# Patient Record
Sex: FEMALE | Race: WHITE | NOT HISPANIC OR LATINO | Employment: UNEMPLOYED | URBAN - METROPOLITAN AREA
[De-identification: names, ages, dates, MRNs, and addresses within clinical notes are randomized per-mention and may not be internally consistent; named-entity substitution may affect disease eponyms.]

---

## 2017-01-11 ENCOUNTER — GENERIC CONVERSION - ENCOUNTER (OUTPATIENT)
Dept: OTHER | Facility: OTHER | Age: 47
End: 2017-01-11

## 2017-02-07 ENCOUNTER — ALLSCRIPTS OFFICE VISIT (OUTPATIENT)
Dept: OTHER | Facility: OTHER | Age: 47
End: 2017-02-07

## 2017-03-22 ENCOUNTER — ALLSCRIPTS OFFICE VISIT (OUTPATIENT)
Dept: OTHER | Facility: OTHER | Age: 47
End: 2017-03-22

## 2017-04-04 ENCOUNTER — ALLSCRIPTS OFFICE VISIT (OUTPATIENT)
Dept: OTHER | Facility: OTHER | Age: 47
End: 2017-04-04

## 2017-04-07 ENCOUNTER — LAB CONVERSION - ENCOUNTER (OUTPATIENT)
Dept: OTHER | Facility: OTHER | Age: 47
End: 2017-04-07

## 2017-04-07 LAB
A/G RATIO (HISTORICAL): 1.7 (CALC) (ref 1–2.5)
ALBUMIN SERPL BCP-MCNC: 4.2 G/DL (ref 3.6–5.1)
ALP SERPL-CCNC: 53 U/L (ref 33–115)
ALT SERPL W P-5'-P-CCNC: 16 U/L (ref 6–29)
AST SERPL W P-5'-P-CCNC: 18 U/L (ref 10–35)
BILIRUB SERPL-MCNC: 0.6 MG/DL (ref 0.2–1.2)
BUN SERPL-MCNC: 17 MG/DL (ref 7–25)
BUN/CREA RATIO (HISTORICAL): NORMAL (CALC) (ref 6–22)
CALCIUM SERPL-MCNC: 9.6 MG/DL (ref 8.6–10.2)
CHLORIDE SERPL-SCNC: 106 MMOL/L (ref 98–110)
CHOLEST SERPL-MCNC: 216 MG/DL (ref 125–200)
CHOLEST/HDLC SERPL: 3.1 (CALC)
CO2 SERPL-SCNC: 28 MMOL/L (ref 20–31)
CREAT SERPL-MCNC: 0.71 MG/DL (ref 0.5–1.1)
EGFR AFRICAN AMERICAN (HISTORICAL): 117 ML/MIN/1.73M2
EGFR-AMERICAN CALC (HISTORICAL): 101 ML/MIN/1.73M2
GAMMA GLOBULIN (HISTORICAL): 2.5 G/DL (CALC) (ref 1.9–3.7)
GLUCOSE (HISTORICAL): 92 MG/DL (ref 65–99)
HBA1C MFR BLD HPLC: 5.3 % OF TOTAL HGB
HDLC SERPL-MCNC: 70 MG/DL
INSULIN (HISTORICAL): 6.3 UIU/ML (ref 2–19.6)
LDL CHOLESTEROL (HISTORICAL): 133 MG/DL (CALC)
NON-HDL-CHOL (CHOL-HDL) (HISTORICAL): 146 MG/DL (CALC)
POTASSIUM SERPL-SCNC: 4.3 MMOL/L (ref 3.5–5.3)
SODIUM SERPL-SCNC: 141 MMOL/L (ref 135–146)
TOTAL PROTEIN (HISTORICAL): 6.7 G/DL (ref 6.1–8.1)
TRIGL SERPL-MCNC: 63 MG/DL
TSH SERPL DL<=0.05 MIU/L-ACNC: 1.99 MIU/L

## 2017-04-18 ENCOUNTER — ALLSCRIPTS OFFICE VISIT (OUTPATIENT)
Dept: OTHER | Facility: OTHER | Age: 47
End: 2017-04-18

## 2017-05-10 ENCOUNTER — GENERIC CONVERSION - ENCOUNTER (OUTPATIENT)
Dept: OTHER | Facility: OTHER | Age: 47
End: 2017-05-10

## 2017-05-24 ENCOUNTER — GENERIC CONVERSION - ENCOUNTER (OUTPATIENT)
Dept: OTHER | Facility: OTHER | Age: 47
End: 2017-05-24

## 2017-07-07 ENCOUNTER — GENERIC CONVERSION - ENCOUNTER (OUTPATIENT)
Dept: OTHER | Facility: OTHER | Age: 47
End: 2017-07-07

## 2017-07-19 ENCOUNTER — GENERIC CONVERSION - ENCOUNTER (OUTPATIENT)
Dept: OTHER | Facility: OTHER | Age: 47
End: 2017-07-19

## 2017-12-27 ENCOUNTER — ALLSCRIPTS OFFICE VISIT (OUTPATIENT)
Dept: OTHER | Facility: OTHER | Age: 47
End: 2017-12-27

## 2017-12-27 ENCOUNTER — TRANSCRIBE ORDERS (OUTPATIENT)
Dept: ADMINISTRATIVE | Facility: HOSPITAL | Age: 47
End: 2017-12-27

## 2017-12-27 DIAGNOSIS — R20.2 PARESTHESIA OF SKIN: ICD-10-CM

## 2017-12-27 DIAGNOSIS — M79.2 PAROXYSMAL NERVE PAIN: ICD-10-CM

## 2017-12-27 DIAGNOSIS — R20.2 PARESTHESIA: Primary | ICD-10-CM

## 2017-12-27 DIAGNOSIS — M79.2 NEURALGIA AND NEURITIS, UNSPECIFIED (CODE): ICD-10-CM

## 2017-12-28 NOTE — PROGRESS NOTES
Assessment   1  Nasal sinus congestion (478 19) (R09 81)   2  Paresthesia of right upper extremity (782 0) (R20 2)   3  Radicular pain of right upper extremity (729 2) (M79 2)    Plan   Nasal sinus congestion    · Azithromycin 250 MG Oral Tablet; TAKE 2 TABLETS ON DAY 1 THEN TAKE 1    TABLET A DAY FOR 4 DAYS  Paresthesia of right upper extremity, Radicular pain of right upper extremity    · * CT SPINE CERVICAL WO CONTRAST; Status:Need Information - Financial    Authorization; Requested for:[id number];    · EMG ONE EXTREMITY WITH OR W/O RELATED PARASPINAL AREAS; Status:Hold For    - Scheduling; Requested for:[id number];   ONE : RUE  Radicular pain of right upper extremity    · PredniSONE 20 MG Oral Tablet; Take 1 tab twice a day for three days then 1 tab    daily for 3 days    Discussion/Summary      Nasal sinus congestion and pharyngitis- drink plenty of fluids  rest  You may take a decongestant such as Sudafed or Allegra D   arm pain and numbness- known cervical spine disc disease  Check xray  Start Prednisone  If no imprvmnt, check EMG  The patient was counseled regarding  Possible side effects of new medications were reviewed with the patient/guardian today  The treatment plan was reviewed with the patient/guardian  The patient/guardian understands and agrees with the treatment plan       Self Referrals: No      Chief Complaint   1  Cold Symptoms  c/c- cold like symptoms x1 day, sore throat, headache, upset stomach, ear pain, nasal congestion, pressure behind eyes  Pt denies fever  Pt also states right arm numbness, spanning from finger tips to shoulder  Pt states she has tried OTC meds for the cold with no relief  History of Present Illness   HPI: Pt has had numbness of right arm for one week  At onset, she was awaken during the night with the pain  She thought she slept on it wrong, pain is waking her up at night  She took Advil 1000 mg which did not help   She then took her husbands Percocet, which helped a little  has had two days of sinus pressure and congestion  She started with a sore throat and ear pain, along with chills  Review of Systems        Constitutional: feeling poorly, but-- as noted in HPI       ENT: sore throat, but-- as noted in HPI  Cardiovascular: no complaints of slow or fast heart rate, no chest pain, no palpitations, no leg claudication or lower extremity edema  Respiratory: no complaints of shortness of breath, no wheezing, no dyspnea on exertion, no orthopnea or PND  Gastrointestinal: no complaints of abdominal pain, no constipation, no nausea or diarrhea, no vomiting, no bloody stools  Neurological: numbness, but-- as noted in HPI  Active Problems   1  Elevated cholesterol (272 0) (E78 00)   2  Elevated liver enzymes (790 5) (R74 8)   3  Moderate persistent asthma (493 90) (J45 40)   4  Overweight (BMI 25 0-29 9) (278 02) (E66 3)    Past Medical History   1  History of Diabetes mellitus screening (V77 1) (Z13 1)   2  History of nausea and vomiting (V12 79) (Z87 898)   3  History of Injury of left lower extremity, initial encounter (959 7) (S89 92XA)   4  History of Lipid screening (V77 91) (Z13 220)   5  Moderate persistent asthma (493 90) (J45 40)  Active Problems And Past Medical History Reviewed: The active problems and past medical history were reviewed and updated today  Family History   Father    1  Family history of Benign Essential Hypertension  Maternal Grandmother    2  Family history of Diabetes Mellitus (V18 0)  Paternal Grandfather    3  Family history of Colon Cancer (V16 0)  Family History Reviewed: The family history was reviewed and updated today  Social History    · Being A Social Drinker   · Denied: History of Drug Use   · Former smoker (F36 42) (O30 230)  The social history was reviewed and updated today  The social history was reviewed and is unchanged  Surgical History   1  History of Abdominoplasty   2  History of Cholecystectomy  Surgical History Reviewed: The surgical history was reviewed and updated today  Current Meds    1  Advair Diskus 250-50 MCG/DOSE Inhalation Aerosol Powder Breath Activated Recorded   2  Azithromycin 250 MG Oral Tablet; TAKE 2 TABLETS ON DAY 1 THEN TAKE 1 TABLET A     DAY FOR 4 DAYS; Therapy: 80LZM8033 to (Evaluate:18Apr2017)  Requested for: 13Apr2017; Last     Rx:13Apr2017 Ordered   3  Claritin CAPS Recorded   4  Flonase 50 MCG/ACT SUSP Recorded   5  HM Hair/Skin/Nails Oral Tablet Recorded   6  Montelukast Sodium 10 MG Oral Tablet; TAKE 1 TABLET DAILY AS DIRECTED; Therapy: 34RXV7982 to (Evaluate:21Apr2017)  Requested for: 35SKH9591; Last     Rx:22Mar2017 Ordered   7  RaNITidine HCl - 150 MG Oral Tablet; TAKE 1 TABLET DAILY; Therapy: 59FEZ9996 to (Evaluate:21Apr2017); Last Rx:22Mar2017 Ordered   8  Ventolin  (90 Base) MCG/ACT Inhalation Aerosol Solution; Therapy: 87GPG6814 to (Evaluate:29Apr2015) Recorded   9  Vitamin D 1000 UNIT Oral Tablet Recorded     The medication list was reviewed and updated today  Allergies   1  Penicillins    Vitals    Recorded: 04HQE9411 10:09AM   Temperature 98 2 F   Heart Rate 71   Respiration 18   Systolic 141 mm Hg   Diastolic 78 mm Hg   Height 5 ft 5 5 in   Weight 167 lb    BMI Calculated 27 37 kg/m2   BSA Calculated 1 84 m2   O2 Saturation 97     Physical Exam        Constitutional      General appearance: No acute distress, well appearing and well nourished  Eyes      Conjunctiva and lids: No swelling, erythema or discharge  Pupils and irises: Equal, round and reactive to light  Ears, Nose, Mouth, and Throat      External inspection of ears and nose: Normal        Otoscopic examination: Tympanic membranes translucent with normal light reflex  Canals patent without erythema  Nasal mucosa, septum, and turbinates: Abnormal  -- nasal congestion with mucoid d/s        Oropharynx: Normal with no erythema, edema, exudate or lesions  Pulmonary      Respiratory effort: No increased work of breathing or signs of respiratory distress  Auscultation of lungs: Clear to auscultation  Cardiovascular      Auscultation of heart: Normal rate and rhythm, normal S1 and S2, without murmurs  Abdomen      Abdomen: Non-tender, no masses  Lymphatic      Palpation of lymph nodes in neck: No lymphadenopathy  Musculoskeletal      Gait and station: Normal  -- full ROM  Pain of [place] and right side of neck with straight arm raise  Skin      Skin and subcutaneous tissue: Normal without rashes or lesions  Neurologic      Sensation: Abnormal  -- pins and needles and painful with light touch using monofilament--to RUE  Psychiatric      Orientation to person, place, and time: Normal        Mood and affect: Normal           Results/Data   PHQ-9 Adult Depression Screening 65Kpm3625 10:12AM User, [facility]      Test Name Result Flag Reference   PHQ-9 Adult Depression Score 0     Over the last two weeks, how often have you been bothered by any of the following problems? Little interest or pleasure in doing things: Not at all - 0     Feeling down, depressed, or hopeless: Not at all - 0     Trouble falling or staying asleep, or sleeping too much: Not at all - 0     Feeling tired or having little energy: Not at all - 0     Poor appetite or over eating: Not at all - 0     Feeling bad about yourself - or that you are a failure or have let yourself or your family down: Not at all - 0     Trouble concentrating on things, such as reading the newspaper or watching television: Not at all - 0     Moving or speaking so slowly that other people could have noticed   Or the opposite -  being so fidgety or restless that you have been moving around a lot more than usual: Not at all - 0     Thoughts that you would be better off dead, or of hurting yourself in some way: Not at all - 0   PHQ-9 Adult Depression Screening Negative     PHQ-9 Difficulty Level Not difficult at all     PHQ-9 Severity No Depression          Message   Return to work or school:    Kirby Garcia is under my professional care   She was seen in my office on 12-27-17    She is able to return to work on  12-30-17             Signatures    Electronically signed by : Kota Em NP; Dec 27 2017 10:32AM EST                       (Author)     Electronically signed by : Libra Ruth MD; Dec 27 2017 11:18AM EST                       (Co-author)

## 2018-01-02 ENCOUNTER — HOSPITAL ENCOUNTER (OUTPATIENT)
Dept: RADIOLOGY | Facility: MEDICAL CENTER | Age: 48
Discharge: HOME | End: 2018-01-02
Payer: COMMERCIAL

## 2018-01-02 DIAGNOSIS — R20.2 PARESTHESIA OF SKIN: ICD-10-CM

## 2018-01-02 DIAGNOSIS — M79.2 NEURALGIA AND NEURITIS, UNSPECIFIED (CODE): ICD-10-CM

## 2018-01-02 PROCEDURE — 72125 CT NECK SPINE W/O DYE: CPT

## 2018-01-03 ENCOUNTER — TRANSCRIBE ORDERS (OUTPATIENT)
Dept: ADMINISTRATIVE | Facility: HOSPITAL | Age: 48
End: 2018-01-03

## 2018-01-03 DIAGNOSIS — R20.2 TINGLING SENSATION: Primary | ICD-10-CM

## 2018-01-03 DIAGNOSIS — M79.2 NERVE PAIN: ICD-10-CM

## 2018-01-10 NOTE — PROGRESS NOTES
History of Present Illness  HPI: Farhat Ramon is here for 2 wk f/u  Current wt 175 9 lbs, gain of 1 1 lb x 2 wks  Has been sick & on prednisone x4 days with 5 to go as well as 2 different antibiotics  Unable to follow meal plan as well or exercise due to illness  She states that prior to getting sick she was down about 2 lbs  Food recall prior to illness is ~1000 calories rest, 1300 exercise days  Discussed where additional protein should be incorporated, specifically lunch and afternoon snack  If she does not lose weight by next appointment in 3 weeks she is going to utilize 2 meal replacements  Bariatric MNT [facility]:   Weight Assessment: IBW: 127 5 lbs, ABW: 139 6 lbs, Weight Change: 1 1 lb gain x 2 wks, Highest Weight: 175 lbs, Lowest Weight: 134 lbs, Goal Weight: 145-150 lbs  Excess calories come from large portions at mealtimes and high calorie high fat food choices  Dietary Recall:   Breakfast: oatmeal OR organic meal (2 scoops 220, 20 gm + 8 oz almond milk (40, 3)  Snack: skip having shake with it OR chobani flips  Lunch: cottage cheese w/pineapple OR sauteed veggies w/olive oil, 1/2 tsp cheese, 1 egg  Snack: skip OR small pear  Dinner: 3-4 oz lean protein, sweet potato, veggies  Snack: skip   Beverages: water, unsweetened tea, coffee, diet ginger ale  Alcohol consumption: social    Food allergies/intolerances: n/a  Cooking: both  Shopping: self  She dines out occasionally  Exercise Frequency:  She exercises yoga 5x/wk 60-90 min, running 2x/wk  Nutrition Prescription: Estimated calories for weight loss: eCalc BMR 1437, wt loss w/o exercise 724-1224; w/exercise 976-1476, Estimated daily protein needs: 70-87 gm (1 2-1 5 gm/kg IBW) and Estimated daily fluid needs: 68 oz (35 cc/kg IBW)  Nutrition Intervention: Counseling provided with emphasis on portion sizes, healthy snack choices, hydration, fiber intake, protein intake, exercise and food journaling     Education materials provided: [employer] manual    Comprehension: good  Expected Compliance: good  Goals: follow meal plan prescribed, reduce portion sizes at mealtimes, plan meals and snacks daily, Choose lower-calorie, lower-fat meal options at home and when dining out, food journal, do not skip meals or snacks and 810-1150 calories  Monitor/Evaluation: weekly weight, food journal, fluid intake and exercise level  Active Problems    1  Abnormal weight gain (783 1) (R63 5)   2  Diarrhea (787 91) (R19 7)   3  Dysphagia (787 20) (R13 10)   4  Elevated cholesterol (272 0) (E78 00)   5  Elevated liver enzymes (790 5) (R74 8)   6  Fatigue (780 79) (R53 83)   7  Gastroenteritis (558 9) (K52 9)   8  Head congestion (478 19) (R09 81)   9  Moderate persistent asthma (493 90) (J45 40)   10  Overweight (BMI 25 0-29 9) (278 02) (E66 3)   11  Viral syndrome (079 99) (B34 9)    Past Medical History    1  History of Diabetes mellitus screening (V77 1) (Z13 1)   2  History of nausea and vomiting (V12 79) (Z87 898)   3  History of Injury of left lower extremity, initial encounter (959 7) (S89 92XA)   4  History of Lipid screening (V77 91) (Z13 220)   5  Moderate persistent asthma (493 90) (J45 40)    Surgical History    1  History of Abdominoplasty   2  History of Cholecystectomy    Family History  Father    1  Family history of Benign Essential Hypertension  Maternal Grandmother    2  Family history of Diabetes Mellitus (V18 0)  Paternal Grandfather    3  Family history of Colon Cancer (V16 0)    Social History    · Being A Social Drinker   · Denied: History of Drug Use   · Former smoker (V15 82) (J17 391)    Current Meds   1  Advair Diskus 250-50 MCG/DOSE Inhalation Aerosol Powder Breath Activated Recorded   2  Azithromycin 250 MG Oral Tablet; TAKE 2 TABLETS ON DAY 1 THEN TAKE 1 TABLET A   DAY FOR 4 DAYS; Therapy: 10LBH5180 to (Evaluate:18Apr2017)  Requested for: 13Apr2017; Last   Rx:13Apr2017 Ordered   3  Claritin CAPS Recorded   4  Flonase 50 MCG/ACT SUSP Recorded   5  HM Hair/Skin/Nails Oral Tablet Recorded   6  Montelukast Sodium 10 MG Oral Tablet; TAKE 1 TABLET DAILY AS DIRECTED; Therapy: 35EPP5015 to (Evaluate:21Apr2017)  Requested for: 71YYW3880; Last   Rx:22Mar2017 Ordered   7  RaNITidine HCl - 150 MG Oral Tablet; TAKE 1 TABLET DAILY; Therapy: 02MPG8208 to (Evaluate:21Apr2017); Last Rx:22Mar2017 Ordered   8  Ventolin  (90 Base) MCG/ACT Inhalation Aerosol Solution; Therapy: 45NUI9066 to (Evaluate:29Apr2015) Recorded   9  Vitamin D 1000 UNIT Oral Tablet Recorded    Allergies    1  Penicillins    Vitals  Signs   Recorded: 18Apr2017 10:22AM   Height: 5 ft 5 5 in  Weight: 175 lb 14 4 oz  BMI Calculated: 28 83  BSA Calculated: 1 88    Future Appointments    Date/Time Provider Specialty Site   05/11/2017 10:00 AM Faith Joaquin [address] [facility]   07/12/2017 11:00 AM FRACISCO Howard  Bariatric Medicine [facility]     Signatures   Electronically signed by :  Frank Leon, ; Apr 18 2017 10:54AM EST                       (Author)    Electronically signed by : FRACISCO Bauman ; Apr 20 2017  7:59AM EST                       (Co-author)

## 2018-01-11 NOTE — RESULT NOTES
Message   her stool tests came back negative       Verified Results  (Q) FECAL LEUKOCYTE STAIN [id number] 10:20AM [facility]     Test Name Result Flag Reference   FECAL LEUKOCYTE STAIN      FECAL LEUKOCYTE STAIN         MICRO NUMBER:      [id number]    TEST STATUS:       FINAL    SPECIMEN SOURCE:   STOOL    SPECIMEN QUALITY:  ADEQUATE    FECAL LEUKOCYTE:   Not Detected

## 2018-01-12 NOTE — CONSULTS
Chief Complaint  Chief Complaint Free Text Note Form: New patient here for MWM consult; Stop Bang 1/8  Patient reports she has been on Qsymia & Phentermine in the past, with little weight loss  Past Medical History    1  Moderate persistent asthma (493 90) (J45 40)  Active Problems And Past Medical History Reviewed: The active problems and past medical history were reviewed and updated today  Assessment    1  Abnormal weight gain (783 1) (R63 5)   2  Elevated liver enzymes (790 5) (R74 8)   3  Moderate persistent asthma (493 90) (J45 40)   4  Overweight (BMI 25 0-29 9) (278 02) (E66 3)   5  Dysphagia (787 20) (R13 10)    Plan   1  (1) COMPREHENSIVE METABOLIC PANEL; Status:Active; Requested for:04Apr2017;    2  (1) HEMOGLOBIN A1C; Status:Active; Requested for:04Apr2017;    3  (1) INSULIN, FASTING; Status:Active; Requested for:04Apr2017;    4  (1) LIPID PANEL, FASTING; Status:Active; Requested for:04Apr2017;    5  (1) TSH WITH FT4 REFLEX; Status:Active; Requested for:04Apr2017;     Discussion/Summary  Discussion Summary:   78-year-old female with elevated liver enzymes, asthma, GERD and excess weight here to pursue medical weight management to improve weight and health  Excess weight/weight gain/overweight:   -discussed options of conservative vs TIFFANIE program +/- meal replacement vs VLCD  -initial focus of 5-10% weight loss over 3-6 mos for improved health  -screening labs  -pt had been prescribed phentermine in the past-stopped working and recently prescribed qsymia  Will hold off on medications so far as she will be starting a structured program  May possibly re-visit weight loss medications if indicated      Dysphagia: Stable  -On H2 blocker  -Follows with GI- Dr Yamilet Devi    Elevated liver enzymes:  -unclear etiology, recheck    Patient is interested in new direction-intensive lifestyle intervention program  We'll set up visit with dietitian and 12 week after completion of program with me  Patient's Capacity to Self-Care: Patient is able to Self-Care  Bariatric Medicine Diagnostic [facility]:   Patient Discussion: 45 minute visit, greater than half of the time was spent on counseling  Counseled patient on the benefits of a modest 5-10% weight loss with regards to cardio metabolic risk  Discussed different nonsurgical interventions including intensive lifestyle intervention program, very low calorie diet program and conservative program  Discussed the role of weight loss medications   Understands and agrees with treatment plan: The treatment plan was reviewed with the patient/guardian  The patient/guardian understands and agrees with the treatment plan      Active Problems    1  Abnormal weight gain (783 1) (R63 5)   2  Diarrhea (787 91) (R19 7)   3  Dysphagia (787 20) (R13 10)   4  Elevated cholesterol (272 0) (E78 00)   5  Elevated liver enzymes (790 5) (R74 8)   6  Fatigue (780 79) (R53 83)   7  Gastroenteritis (558 9) (K52 9)   8  Head congestion (478 19) (R09 81)   9  Moderate persistent asthma (493 90) (J45 40)   10  Viral syndrome (079 99) (B34 9)    Surgical History    1  History of Cholecystectomy  Surgical History Reviewed: The surgical history was reviewed and updated today  Family History  Father    1  Family history of Benign Essential Hypertension  Maternal Grandmother    2  Family history of Diabetes Mellitus (V18 0)  Paternal Grandfather    3  Family history of Colon Cancer (V16 0)  Family History Reviewed: The family history was reviewed and updated today  Social History    · Being A Social Drinker   · Denied: History of Drug Use   · Former smoker (P44 84) (Y37 262)  Social History Reviewed: The social history was reviewed and updated today  Current Meds   1  Advair Diskus 250-50 MCG/DOSE Inhalation Aerosol Powder Breath Activated Recorded   2  Claritin CAPS Recorded   3  Flonase 50 MCG/ACT SUSP Recorded   4  HM Hair/Skin/Nails Oral Tablet Recorded   5   Montelukast Sodium 10 MG Oral Tablet; TAKE 1 TABLET DAILY AS DIRECTED; Therapy: 69EFH5235 to (Evaluate:21Apr2017)  Requested for: 51DHL1359; Last   Rx:22Mar2017 Ordered   6  RaNITidine HCl - 150 MG Oral Tablet; TAKE 1 TABLET DAILY; Therapy: 71SNM4842 to (Evaluate:21Apr2017); Last Rx:22Mar2017 Ordered   7  Ventolin  (90 Base) MCG/ACT Inhalation Aerosol Solution; Therapy: 58VFA4318 to (Evaluate:29Apr2015) Recorded   8  Vitamin D 1000 UNIT Oral Tablet Recorded  Medication List Reviewed: The medication list was reviewed and updated today  Allergies    1  Penicillins    Vitals  Vital Signs    Recorded: 72NPD5847 11:22AM   Temperature 98 4 F    Heart Rate 80    Respiration 16    Systolic 562    Diastolic 82    Height 5 ft 5 5 in    Weight 173 lb 9 6 oz    BMI Calculated 28 45    BSA Calculated 1 87    Waist Circumference  35   Neck Circumference  14 5     Results/Data  STOP BANG Questionnaire 04Apr2017 11:37AM User, [facility]     Test Name Result Flag Reference   STOP BANG Questionnaire Risk of SCOTT Low Risk     Snoring: No  Tired: Yes  Observed: No  Blood Pressure: No  BMI: No  Age: No  Neck Circumference: No  Gender: No   STOP BANG Questionnaire SCOTT Total Score 1     Snoring: No  Tired: Yes  Observed: No  Blood Pressure: No  BMI: No  Age: No  Neck Circumference: No  Gender: No       Future Appointments    Date/Time Provider Specialty Site   04/19/2017 11:00 AM Harpreet Titus MD Family Medicine [address] [place] [address]   04/18/2017 10:30 AM Faith Joaquin [facility]   07/12/2017 11:00 AM FRACISCO Snider   Bariatric Medicine [facility]     Signatures   Electronically signed by : FRACISCO Martinez ; Apr 4 2017  5:46PM EST                       (Author)    Electronically signed by : FRACISCO Martinez ; Apr 4 2017  5:47PM EST                       (Author)

## 2018-01-13 VITALS
DIASTOLIC BLOOD PRESSURE: 82 MMHG | BODY MASS INDEX: 27.9 KG/M2 | SYSTOLIC BLOOD PRESSURE: 126 MMHG | TEMPERATURE: 98.4 F | WEIGHT: 173.6 LBS | HEART RATE: 80 BPM | HEIGHT: 66 IN | RESPIRATION RATE: 16 BRPM

## 2018-01-13 VITALS
OXYGEN SATURATION: 98 % | TEMPERATURE: 98.3 F | HEART RATE: 71 BPM | SYSTOLIC BLOOD PRESSURE: 128 MMHG | HEIGHT: 66 IN | WEIGHT: 175.13 LBS | DIASTOLIC BLOOD PRESSURE: 82 MMHG | BODY MASS INDEX: 28.15 KG/M2

## 2018-01-13 NOTE — PROGRESS NOTES
History of Present Illness  HPI: Eli Bolivar is here for initial RD session for [facility] program  Current wt: 174 8 lbs  States she has tired multiple diets in the past and still continues to gain weight  Exercises doing yoga and will soon start running 2x/wk to train for a 5K  Reports she has tried to eat 800 calories/day in the past and she gained weight with this  Explained that her needs for weight loss do range between 800-1000 calories with slightly higher amounts of exercise days  She is skeptical of this  Eats off salad plate to keep portion sizes controlled  Not currently tracking but willing to do so  [facility]ariatric [facility]:   Weight Assessment: IBW: 127 5 lbs, ABW: 139 3 lbs, Highest Weight: 175 lbs, Lowest Weight: 134 lbs, Goal Weight: 145-150 lbs  Excess calories come from unknown as dietary recall reflects good intake, ? accuracy of this recall  Dietary Recall:   Breakfast: 5:30 a m   9:00: 1/2 c Fage nonfat yogurt, fruit, 1 tsp honey or 1/2 cup baked oatmeal    Snack: 11:30 cheese stick  Lunch: 12:30-1: ~3 oz lean protein, veggies, hummus, sometimes crackers  Snack: skip  Dinner: 5:30-6: ~3 oz lean protein, sometimes carb, pasta 1x/wk  Snack: usually skip   Beverages: water, coffee w/Portuguese vanilla creamer  Estimated fluid intake: <128 oz  Alcohol consumption: social    Food allergies/intolerances: n/a  Cooking: both  Shopping: self  She dines out occasionally  Exercise Frequency:  She exercises yoga 5x/wk 60-90 minutes, running 2x/wk   Nutrition Prescription: Estimated calories for weight loss: Tanita BMR 1480x1  3-4001=521, eCalc BMR 1426, wt loss w/o exercise 711-1211, w/exercise 961-1461, Estimated daily protein needs: 70-87 gm (1 2-1 5 gm/kg IBW) and Estimated daily fluid needs: 68 oz (35 cc/kg IBW)  Breakfast: 200, >15 gm: Fage Greek yogurt: 140, 12 & hard boiled egg  Snack: food snack  Lunch: 200-300, 21 gm: 3 oz lean pro, 1 carb, veggies     Snack: food snack  Dinner: 250-360 calories: 3 oz lean pro, 1 carb, veggies, 1 fat  Snack: skip or food snack   Nutrition Intervention: Counseling provided with emphasis on meal planning, portion sizes, healthy snack choices, hydration, fiber intake, protein intake, exercise and food journaling  Education materials provided: [facility] manual and calorie controlled menus  Comprehension: good  Expected Compliance: good  Goals: follow meal plan prescribed, reduce portion sizes at mealtimes, plan meals and snacks daily, Choose lower-calorie, lower-fat meal options at home and when dining out, food journal, do not skip meals or snacks and 810-1150 calories  Monitor/Evaluation: weekly weight, food journal, fluid intake and exercise level  Active Problems     1  Diarrhea (787 91) (R19 7)   2  Elevated cholesterol (272 0) (E78 00)   3  Fatigue (780 79) (R53 83)   4  Gastroenteritis (558 9) (K52 9)   5  Head congestion (478 19) (R09 81)   6  Viral syndrome (079 99) (B34 9)    Moderate persistent asthma (493 90) (J45 40)       Elevated liver enzymes (790 5) (R74 8)       Dysphagia (787 20) (R13 10)       Abnormal weight gain (783 1) (R63 5)          Past Medical History     1  History of Diabetes mellitus screening (V77 1) (Z13 1)   2  History of nausea and vomiting (V12 79) (Z87 898)   3  History of Injury of left lower extremity, initial encounter (959 7) (S89 92XA)   4  History of Lipid screening (V77 91) (Z13 220)    Moderate persistent asthma (493 90) (J45 40)          Surgical History    1  History of Abdominoplasty   2  History of Cholecystectomy    Family History  Father    1  Family history of Benign Essential Hypertension  Maternal Grandmother    2  Family history of Diabetes Mellitus (V18 0)  Paternal Grandfather    3  Family history of Colon Cancer (V16 0)    Social History    · Being A Social Drinker   · Denied: History of Drug Use   · Former smoker (V15 82) (B92 587)    Current Meds   1   Advair Diskus 250-50 MCG/DOSE Inhalation Aerosol Powder Breath Activated Recorded   2  Claritin CAPS Recorded   3  Flonase 50 MCG/ACT SUSP Recorded   4  HM Hair/Skin/Nails Oral Tablet Recorded   5  Montelukast Sodium 10 MG Oral Tablet; TAKE 1 TABLET DAILY AS DIRECTED; Therapy: [id number] to (Evaluate:21Apr2017)  Requested for: [id number]; Last   Rx:22Mar2017 Ordered   6  RaNITidine HCl - 150 MG Oral Tablet; TAKE 1 TABLET DAILY; Therapy: [id number] to (Evaluate:21Apr2017); Last Rx:22Mar2017 Ordered   7  Ventolin  (90 Base) MCG/ACT Inhalation Aerosol Solution; Therapy: [id number] to (Evaluate:29Apr2015) Recorded   8  Vitamin D 1000 UNIT Oral Tablet Recorded    Allergies    1  Penicillins    Results/Data  [employer] Flowsheet [id number] 09:12AM Srinivas Cardona     Test Name Result Flag Reference   Height 65 5     Weight 174 8     Body Fat % 38 4     Fat Mass 67 2     FFM ( Fat Free Mass) 107 6     Muscle Mass 102 2     BMI 28 6     TBW ( Total Body Water) 75 2     TBW % 43 0     Bone Mass 5 4     BMR ( Basal Metabolic Rate) 6344     Metabolic Age 62     Visceral Fat Rating 8         Future Appointments    Date/Time Provider Specialty Site   04/19/2017 11:00 AM Anjali Miller MD Family Medicine [address] [place] [address]   04/18/2017 10:30 AM Faith Joaquin [address] [facility]   07/12/2017 11:00 AM FRACISCO Casey  Bariatric Medicine [facility]     Signatures   Electronically signed by :  Nuno Quintero, ; Apr 5 2017  9:41AM EST                       (Author)    Electronically signed by : FRACISCO Scott ; Apr 6 2017 10:39AM EST                       (Co-author)

## 2018-01-14 VITALS — HEIGHT: 66 IN | BODY MASS INDEX: 28.27 KG/M2 | WEIGHT: 175.9 LBS

## 2018-01-15 VITALS
DIASTOLIC BLOOD PRESSURE: 80 MMHG | WEIGHT: 175.5 LBS | OXYGEN SATURATION: 98 % | TEMPERATURE: 97.4 F | SYSTOLIC BLOOD PRESSURE: 128 MMHG | HEART RATE: 71 BPM | HEIGHT: 66 IN | BODY MASS INDEX: 28.21 KG/M2

## 2018-01-22 VITALS
OXYGEN SATURATION: 97 % | DIASTOLIC BLOOD PRESSURE: 78 MMHG | BODY MASS INDEX: 26.84 KG/M2 | HEIGHT: 66 IN | TEMPERATURE: 98.2 F | WEIGHT: 167 LBS | SYSTOLIC BLOOD PRESSURE: 120 MMHG | HEART RATE: 71 BPM | RESPIRATION RATE: 18 BRPM

## 2018-01-23 NOTE — MISCELLANEOUS
Message  Return to work or school:   Anthony Zambrano is under my professional care   She was seen in my office on 12-27-17   She is able to return to work on  12-30-17            Signatures   Electronically signed by : Christina Peña NP; Dec 27 2017 10:28AM EST                       (Author)    Electronically signed by : Christina Peña NP; Dec 27 2017 10:32AM EST                       (Author)

## 2018-01-31 ENCOUNTER — HOSPITAL ENCOUNTER (OUTPATIENT)
Dept: RADIOLOGY | Facility: CLINIC | Age: 48
Discharge: HOME | End: 2018-01-31
Payer: COMMERCIAL

## 2018-01-31 ENCOUNTER — TELEPHONE (OUTPATIENT)
Dept: FAMILY MEDICINE CLINIC | Facility: CLINIC | Age: 48
End: 2018-01-31

## 2018-01-31 DIAGNOSIS — R20.2 TINGLING SENSATION: ICD-10-CM

## 2018-01-31 DIAGNOSIS — M79.2 NERVE PAIN: ICD-10-CM

## 2018-01-31 PROCEDURE — 95886 MUSC TEST DONE W/N TEST COMP: CPT | Performed by: PHYSICAL MEDICINE & REHABILITATION

## 2018-01-31 PROCEDURE — 95909 NRV CNDJ TST 5-6 STUDIES: CPT | Performed by: PHYSICAL MEDICINE & REHABILITATION

## 2018-01-31 NOTE — TELEPHONE ENCOUNTER
It is OK for patient to take percocet for severe pain  I would rather her take an anti inflammatory such as ibuprofen 800 mg every 8 hours   This would be 4 tabs of OTC Ibu

## 2018-01-31 NOTE — PROCEDURES
Procedures      Electromyogram and Nerve Conduction Velocity Procedure Note    HX:    This is a 18-year-old right-hand-dominant female with several months of intermittent paresthesias nocturnally in the right hand with minimal symptoms in the left hand  No associated neck or shoulder pain no history of trauma no radicular symptoms are reported  She is referred by her PCP for an electrodiagnostic  Study  PMH:     No history of diabetes cancer hypothyroidism  Exam:       On exam there is no focal or lateralizing sensory or motor loss reflexes are symmetric positive Tinel sign at the right elbow over the ulnar nerve    Procedure: As with all patients this patient was informed that they may terminate the  examine at any time  Patient tolerated the procedure well with no adverse effects reported or observed  EMG/ NCV was performed of the right upper extremity with a screening NCV on the left side  Both paraspinal muscles were sampled EMG of the left upper extremity was deferred due to low expectation of any significant   Diagnostic yield  Findings:  Please see the Gen[employer] Medium data printout  Conclusion:     1  There is evidence for focal involvement of the median nerve at the right wrist   There is mild sensory fibers line with relative slowing of the motor fibers consistent with the clinical diagnosis of a "carpal tunnel syndrome" which is mild to moderate on the right  At this time median nerve function was entirely normal on the left side  2   There is no electrophysiologic dated indicated suggest a cervical radiculopathy or plexopathy nor is there evidence for any underlying large fiber polyneuropathy  Recommendations:         Careful clinical  correlation is advised

## 2018-01-31 NOTE — TELEPHONE ENCOUNTER
Patient stated that she cannot take Ibuprofen do to a procedure she is having within the next couple of days

## 2018-01-31 NOTE — TELEPHONE ENCOUNTER
Patient had EMG today and made a follow up appt with you for Tuesday to review  She is requesting more steroids

## 2018-02-02 ENCOUNTER — OFFICE VISIT (OUTPATIENT)
Dept: FAMILY MEDICINE CLINIC | Facility: CLINIC | Age: 48
End: 2018-02-02
Payer: COMMERCIAL

## 2018-02-02 VITALS
BODY MASS INDEX: 26.84 KG/M2 | OXYGEN SATURATION: 96 % | WEIGHT: 167 LBS | DIASTOLIC BLOOD PRESSURE: 70 MMHG | HEART RATE: 78 BPM | SYSTOLIC BLOOD PRESSURE: 116 MMHG | HEIGHT: 66 IN | TEMPERATURE: 98.3 F

## 2018-02-02 DIAGNOSIS — J20.9 ACUTE BRONCHITIS, UNSPECIFIED ORGANISM: Primary | ICD-10-CM

## 2018-02-02 PROBLEM — E66.3 OVERWEIGHT (BMI 25.0-29.9): Status: ACTIVE | Noted: 2017-04-04

## 2018-02-02 PROCEDURE — 99213 OFFICE O/P EST LOW 20 MIN: CPT | Performed by: FAMILY MEDICINE

## 2018-02-02 RX ORDER — FLUTICASONE PROPIONATE 50 MCG
1 SPRAY, SUSPENSION (ML) NASAL AS NEEDED
COMMUNITY

## 2018-02-02 RX ORDER — ALBUTEROL SULFATE 90 UG/1
1 AEROSOL, METERED RESPIRATORY (INHALATION) AS NEEDED
COMMUNITY
End: 2022-03-15 | Stop reason: SDUPTHER

## 2018-02-02 RX ORDER — PROMETHAZINE HYDROCHLORIDE AND CODEINE PHOSPHATE 6.25; 1 MG/5ML; MG/5ML
5 SYRUP ORAL EVERY 6 HOURS PRN
Qty: 180 ML | Refills: 0 | OUTPATIENT
Start: 2018-02-02 | End: 2018-04-13

## 2018-02-02 RX ORDER — LORATADINE AND PSEUDOEPHEDRINE 10; 240 MG/1; MG/1
1 TABLET, EXTENDED RELEASE ORAL DAILY
COMMUNITY
End: 2018-03-29 | Stop reason: ALTCHOICE

## 2018-02-02 RX ORDER — AZITHROMYCIN 250 MG/1
TABLET, FILM COATED ORAL
Qty: 6 TABLET | Refills: 0 | Status: SHIPPED | OUTPATIENT
Start: 2018-02-02 | End: 2018-02-06

## 2018-02-02 RX ORDER — RANITIDINE 150 MG/1
150 TABLET ORAL AS NEEDED
COMMUNITY
Start: 2017-03-17 | End: 2021-05-17 | Stop reason: ALTCHOICE

## 2018-02-02 RX ORDER — PREDNISONE 20 MG/1
20 TABLET ORAL DAILY
Qty: 5 TABLET | Refills: 0 | Status: SHIPPED | OUTPATIENT
Start: 2018-02-02 | End: 2018-03-29 | Stop reason: ALTCHOICE

## 2018-02-02 RX ORDER — DIPHENOXYLATE HYDROCHLORIDE AND ATROPINE SULFATE 2.5; .025 MG/1; MG/1
1 TABLET ORAL
COMMUNITY
Start: 2016-02-23 | End: 2022-02-07

## 2018-02-02 NOTE — PROGRESS NOTES
Assessment/Plan:     Diagnoses and all orders for this visit:    Acute bronchitis, unspecified organism  -     azithromycin (ZITHROMAX) 250 mg tablet; Take 2 tablets today then 1 tablet daily x 4 days  -     promethazine-codeine (PHENERGAN WITH CODEINE) 6 25-10 mg/5 mL syrup; Take 5 mL by mouth every 6 (six) hours as needed for cough  -     predniSONE 20 mg tablet; Take 1 tablet (20 mg total) by mouth daily    Other orders  -     fluticasone-salmeterol (ADVAIR DISKUS) 250-50 mcg/dose inhaler; Inhale  -     albuterol (PROVENTIL HFA,VENTOLIN HFA) 90 mcg/act inhaler; Inhale 1 puff every 4 (four) hours  -     fluticasone (FLONASE) 50 mcg/act nasal spray; 1 spray into each nostril  -     Multiple Vitamins-Minerals (HM HAIR/SKIN/NAILS PO); Take by mouth  -     multivitamin (THERAGRAN) TABS; Take 1 tablet by mouth  -     ranitidine (ZANTAC) 150 mg tablet;   -     cholecalciferol (VITAMIN D3) 1,000 units tablet; Take by mouth  -     loratadine-pseudoephedrine (CLARITIN-D 24-HOUR)  mg per 24 hr tablet; Take 1 tablet by mouth daily          Subjective:      Patient ID: Guilherme Steward is a 50 y o  female  Cough, congestion, ear pressure, nasal drip, no fevers for 10 days  Getting worse  Tried OTC claritin, Robitussin D  H/O Asthma  Using albuterol PRN  Cough   This is a new problem  The current episode started 1 to 4 weeks ago  The problem has been gradually worsening  The problem occurs constantly  The cough is productive of sputum  Associated symptoms include ear congestion, ear pain, postnasal drip and a sore throat  Pertinent negatives include no chills, fever, shortness of breath or wheezing         The following portions of the patient's history were reviewed and updated as appropriate: allergies, current medications, past family history, past medical history, past social history, past surgical history and problem list     Review of Systems   Constitutional: Negative for activity change, appetite change, chills and fever  HENT: Positive for congestion, ear pain, postnasal drip and sore throat  Eyes: Negative  Respiratory: Positive for cough  Negative for shortness of breath and wheezing  Objective:     Physical Exam   Constitutional: She is oriented to person, place, and time  She appears well-developed and well-nourished  No distress  HENT:   Right Ear: External ear normal    Left Ear: External ear normal    Nose: Nose normal    Mouth/Throat: Uvula is midline  No oropharyngeal exudate or posterior oropharyngeal erythema  Eyes: Conjunctivae are normal  Pupils are equal, round, and reactive to light  Cardiovascular: Normal rate and normal heart sounds  Exam reveals no gallop and no friction rub  No murmur heard  Pulmonary/Chest: Effort normal  No respiratory distress  She has wheezes  She has no rales  Neurological: She is alert and oriented to person, place, and time  Skin: Skin is warm  No rash noted  Psychiatric: She has a normal mood and affect  Her behavior is normal  Judgment and thought content normal    Nursing note and vitals reviewed

## 2018-02-02 NOTE — LETTER
February 2, 2018     Patient: Molly Clements   YOB: 1970   Date of Visit: 2/2/2018       To Whom it May Concern:    Molly Clements is under my professional care  She was seen in my office on 2/2/2018  She can go back to work on 2/4/18  If you have any questions or concerns, please don't hesitate to call           Sincerely,          Keena Milan MD        CC: No Recipients

## 2018-02-06 ENCOUNTER — OFFICE VISIT (OUTPATIENT)
Dept: FAMILY MEDICINE CLINIC | Facility: CLINIC | Age: 48
End: 2018-02-06
Payer: COMMERCIAL

## 2018-02-06 VITALS
DIASTOLIC BLOOD PRESSURE: 82 MMHG | SYSTOLIC BLOOD PRESSURE: 130 MMHG | WEIGHT: 167 LBS | TEMPERATURE: 97.6 F | RESPIRATION RATE: 18 BRPM | HEART RATE: 68 BPM | BODY MASS INDEX: 26.84 KG/M2 | HEIGHT: 66 IN | OXYGEN SATURATION: 96 %

## 2018-02-06 DIAGNOSIS — R05.9 COUGH: ICD-10-CM

## 2018-02-06 DIAGNOSIS — G56.01 CARPAL TUNNEL SYNDROME OF RIGHT WRIST: Primary | ICD-10-CM

## 2018-02-06 DIAGNOSIS — J31.0 RHINITIS, NONALLERGIC: ICD-10-CM

## 2018-02-06 DIAGNOSIS — M47.22 OSTEOARTHRITIS OF SPINE WITH RADICULOPATHY, CERVICAL REGION: ICD-10-CM

## 2018-02-06 PROCEDURE — 99214 OFFICE O/P EST MOD 30 MIN: CPT | Performed by: NURSE PRACTITIONER

## 2018-02-06 RX ORDER — AZELASTINE 1 MG/ML
1 SPRAY, METERED NASAL 2 TIMES DAILY
Qty: 30 ML | Refills: 0 | Status: SHIPPED | OUTPATIENT
Start: 2018-02-06 | End: 2018-04-13

## 2018-02-06 NOTE — PATIENT INSTRUCTIONS
Cough- due to postnasal drip  Start Astelin nasal spray  You may continue Robitussin  Post viral coughs may last 6-8 weeks  Carpal tunnel- wear wrist splint as instructed  Eval by ortho surgery  Spondylosis of cervical spine- neck exercises  You may take tylenol for pain  Carpal Tunnel Syndrome   AMBULATORY CARE:   Carpal tunnel syndrome (CTS)  is a condition that causes pressure to build in the carpal tunnel  The carpal tunnel is a small area between bones and tissues in your wrist  Swelling in this area puts pressure on the median nerve  The median nerve controls muscles and feeling in the hand  Common signs and symptoms:   · Dull, sharp, or shooting pain in your hand    · Numbness, tingling, or a burning feeling in your thumb, first finger, and middle finger    · Arm pain that may extend to your shoulder    · Weakness in your hand    · Swelling in your hand    · Not being able to control how your hand moves, or you drop objects  Seek care immediately if:   · You suddenly lose feeling in your hand or fingers and you cannot move them  · Your hand suddenly changes color  Contact your healthcare provider if:   · Your symptoms get worse  · Your hand and fingers are so weak that you cannot grab, squeeze, or lift items  · You have questions or concerns about your condition or care  Treatment  may not be needed  If your symptoms continue or are severe, you may need any of the following:  · NSAIDs  may be recommended to decrease swelling and pain  NSAIDs are available without a doctor's order  Ask your healthcare provider which medicine is right for you and how much to take  Take as directed  NSAIDs can cause stomach bleeding or kidney problems if not taken correctly  If you take blood thinning medicine, always ask your healthcare provider if NSAIDs are safe for you  · Steroid injections  may help decrease pain and swelling  Steroid medicine is injected into the carpal tunnel  · Transcutaneous electric nerve stimulation  uses mild electrical impulses to help decrease your wrist pain  · Surgery  called decompression may be used to take pressure off of the median nerve in your wrist   Manage your symptoms:   · Apply ice to your wrist   Ice helps decrease swelling and pain in your wrist  Ice may also help prevent tissue damage  Use an ice pack, or put crushed ice in a plastic bag  Cover the ice pack with a towel  Place it on your wrist for 15 to 20 minutes every hour, or as directed  · Rest your hands  Let your hands rest for a short time between repetitive motions, such as typing  If you feel pain, stop what you are doing and gently massage your wrist and hand  · Get physical and occupational therapy, if directed  Physical therapists will show you ways to exercise and strengthen your wrist  Occupational therapists will show you safe ways to use your wrist while you do your usual activities  · Use a wrist splint as directed  A splint will keep your wrist straight or in a slightly bent position  A wrist splint decreases pressure on the median nerve by letting your wrist rest  You may need to wear the splint for up to 8 weeks  You may need to wear it at night  Follow up with your healthcare provider as directed:  Write down your questions so you remember to ask them during your visits  © 2017 [address] Information is for End User's use only and may not be sold, redistributed or otherwise used for commercial purposes  All illustrations and images included in CareNotes® are the copyrighted property of [employer]  or [facility]  The above information is an  only  It is not intended as medical advice for individual conditions or treatments  Talk to your doctor, nurse or pharmacist before following any medical regimen to see if it is safe and effective for you    Neck Exercises   WHAT YOU NEED TO KNOW:   Why is it important to do neck exercises? Neck exercises help reduce neck pain, and improve neck movement and strength  Neck exercises also help prevent long-term neck problems  What do I need to know about neck exercises? · Do the exercises every day,  or as often as directed by your healthcare provider  · Move slowly, gently, and smoothly  Avoid fast or jerky motions  · Stand and sit the way your healthcare provider shows you  Good posture may reduce your neck pain  Check your posture often, even when you are not doing your neck exercises  How do I perform neck exercises safely? · Exercise position:  You may sit or stand while you do neck exercises  Face forward  Your shoulders should be straight and relaxed, with a good posture  · Head tilts, forward and back:  Gently bow your head and try to touch your chin to your chest  Your healthcare provider may tell you to push on the back of your neck to help bow your head  Raise your chin back to the starting position  Tilt your head back as far as possible so you are looking up at the ceiling  Your healthcare provider may tell you to lift your chin to help tilt your head back  Return your head to the starting position  · Head tilts, side to side:  Tilt your head, bringing your ear toward your shoulder  Then tilt your head toward the other shoulder  · Head turns:  Turn your head to look over your shoulder  Tilt your chin down and try to touch it to your shoulder  Do not raise your shoulder to your chin  Face forward again  Do the same on the other side  · Head rolls:  Slowly bring your chin toward your chest  Next, roll your head to the right  Your ear should be positioned over your shoulder  Hold this position for 5 seconds  Roll your head back toward your chest and to the left into the same position  Hold for 5 seconds  Gently roll your head back and around in a clockwise [place] 3 times   Next, move your head in the reverse direction (counterclockwise) in a [place] 3 times  Do not shrug your shoulders upwards while you do this exercise  When should I contact my healthcare provider? · Your pain does not get better, or gets worse  · You have questions or concerns about your condition, care, or exercise program   CARE AGREEMENT:   You have the right to help plan your care  Learn about your health condition and how it may be treated  Discuss treatment options with your caregivers to decide what care you want to receive  You always have the right to refuse treatment  The above information is an  only  It is not intended as medical advice for individual conditions or treatments  Talk to your doctor, nurse or pharmacist before following any medical regimen to see if it is safe and effective for you  © 2017 [address] Information is for End User's use only and may not be sold, redistributed or otherwise used for commercial purposes  All illustrations and images included in CareNotes® are the copyrighted property of [employer]  or [employer]

## 2018-02-06 NOTE — PROGRESS NOTES
Assessment/Plan:    No problem-specific Assessment & Plan notes found for this encounter  Diagnoses and all orders for this visit:    Carpal tunnel syndrome of right wrist  -     Cock Up Wrist Splint  -     Ambulatory referral to Orthopedic Surgery; Future    Osteoarthritis of spine with radiculopathy, cervical region    Cough    Rhinitis, nonallergic  -     azelastine (ASTELIN) 0 1 % nasal spray; 1 spray into each nostril 2 (two) times a day Use in each nostril as directed          Subjective:      Patient ID: Elizabeth Adame is a 50 y o  female  Pt is here for follow up after having EMG  EMG was ordered because she was having intermittent numbness and tingling of right hand, mostly at night  EMG shows moderate carpal tunnel syndrome  Neck pain- CT of cervical spine shows spondylosis  Pt was seen for cough 4 days ago  She was started on abx for bronchitis  She would like "something for my cough because it isn't getting any better"  She was given steroids, z-mihai, and cough medicine with codeine  She has been using OTC cough and cold medication  She has no wheezing  The following portions of the patient's history were reviewed and updated as appropriate:   She  has no past medical history on file  She  does not have any pertinent problems on file  She  has no past surgical history on file  Her family history is not on file  She  reports that she has never smoked  She has never used smokeless tobacco  She reports that she drinks alcohol  She reports that she does not use drugs    Current Outpatient Prescriptions   Medication Sig Dispense Refill    albuterol (PROVENTIL HFA,VENTOLIN HFA) 90 mcg/act inhaler Inhale 1 puff every 4 (four) hours      azelastine (ASTELIN) 0 1 % nasal spray 1 spray into each nostril 2 (two) times a day Use in each nostril as directed 30 mL 0    azithromycin (ZITHROMAX) 250 mg tablet Take 2 tablets today then 1 tablet daily x 4 days 6 tablet 0    cholecalciferol (VITAMIN D3) 1,000 units tablet Take by mouth      fluticasone (FLONASE) 50 mcg/act nasal spray 1 spray into each nostril      fluticasone-salmeterol (ADVAIR DISKUS) 250-50 mcg/dose inhaler Inhale      loratadine-pseudoephedrine (CLARITIN-D 24-HOUR)  mg per 24 hr tablet Take 1 tablet by mouth daily      Multiple Vitamins-Minerals (HM HAIR/SKIN/NAILS PO) Take by mouth      multivitamin (THERAGRAN) TABS Take 1 tablet by mouth      predniSONE 20 mg tablet Take 1 tablet (20 mg total) by mouth daily 5 tablet 0    promethazine-codeine (PHENERGAN WITH CODEINE) 6 25-10 mg/5 mL syrup Take 5 mL by mouth every 6 (six) hours as needed for cough 180 mL 0    ranitidine (ZANTAC) 150 mg tablet        No current facility-administered medications for this visit  Current Outpatient Prescriptions on File Prior to Visit   Medication Sig    albuterol (PROVENTIL HFA,VENTOLIN HFA) 90 mcg/act inhaler Inhale 1 puff every 4 (four) hours    azithromycin (ZITHROMAX) 250 mg tablet Take 2 tablets today then 1 tablet daily x 4 days    cholecalciferol (VITAMIN D3) 1,000 units tablet Take by mouth    fluticasone (FLONASE) 50 mcg/act nasal spray 1 spray into each nostril    fluticasone-salmeterol (ADVAIR DISKUS) 250-50 mcg/dose inhaler Inhale    loratadine-pseudoephedrine (CLARITIN-D 24-HOUR)  mg per 24 hr tablet Take 1 tablet by mouth daily    Multiple Vitamins-Minerals (HM HAIR/SKIN/NAILS PO) Take by mouth    multivitamin (THERAGRAN) TABS Take 1 tablet by mouth    predniSONE 20 mg tablet Take 1 tablet (20 mg total) by mouth daily    promethazine-codeine (PHENERGAN WITH CODEINE) 6 25-10 mg/5 mL syrup Take 5 mL by mouth every 6 (six) hours as needed for cough    ranitidine (ZANTAC) 150 mg tablet      No current facility-administered medications on file prior to visit  She is allergic to penicillins       Review of Systems   Constitutional: Positive for chills  Negative for fever  HENT: Negative      Eyes: Negative  Respiratory: Positive for cough  Cardiovascular: Negative  Gastrointestinal: Negative  Endocrine: Negative  Musculoskeletal: Positive for neck pain  Neck pain  Skin: Negative  Allergic/Immunologic: Negative  Neurological: Positive for numbness  Negative for weakness  Numbness of tingling of right hand   Hematological: Negative  All other systems reviewed and are negative  Objective:     Physical Exam   Constitutional: She is oriented to person, place, and time  She appears well-developed and well-nourished  No distress  HENT:   Head: Normocephalic and atraumatic  Right Ear: External ear normal    Left Ear: External ear normal    Nose: Nose normal    Mouth/Throat: Oropharynx is clear and moist  No oropharyngeal exudate  Clear rhinorrhea   Eyes: Conjunctivae and EOM are normal  Pupils are equal, round, and reactive to light  Neck: Normal range of motion  Neck supple  Cardiovascular: Normal rate, regular rhythm and normal heart sounds  Exam reveals no gallop and no friction rub  No murmur heard  Pulmonary/Chest: Effort normal and breath sounds normal  No respiratory distress  She has no wheezes  She has no rales  Abdominal: Soft  Musculoskeletal: Normal range of motion  Lymphadenopathy:     She has no cervical adenopathy  Neurological: She is alert and oriented to person, place, and time  Skin: Skin is warm and dry  No rash noted  She is not diaphoretic  Psychiatric: She has a normal mood and affect  Her behavior is normal  Judgment and thought content normal    Nursing note and vitals reviewed

## 2018-03-29 ENCOUNTER — OFFICE VISIT (OUTPATIENT)
Dept: FAMILY MEDICINE CLINIC | Facility: CLINIC | Age: 48
End: 2018-03-29
Payer: COMMERCIAL

## 2018-03-29 VITALS
DIASTOLIC BLOOD PRESSURE: 72 MMHG | HEIGHT: 66 IN | WEIGHT: 165 LBS | BODY MASS INDEX: 26.52 KG/M2 | RESPIRATION RATE: 18 BRPM | SYSTOLIC BLOOD PRESSURE: 124 MMHG | TEMPERATURE: 98.1 F | HEART RATE: 74 BPM | OXYGEN SATURATION: 98 %

## 2018-03-29 DIAGNOSIS — H93.8X3 EAR CONGESTION, BILATERAL: Primary | ICD-10-CM

## 2018-03-29 DIAGNOSIS — Z13.1 DIABETES MELLITUS SCREENING: ICD-10-CM

## 2018-03-29 DIAGNOSIS — Z13.220 LIPID SCREENING: ICD-10-CM

## 2018-03-29 PROCEDURE — 99214 OFFICE O/P EST MOD 30 MIN: CPT | Performed by: FAMILY MEDICINE

## 2018-03-29 RX ORDER — METHYLPREDNISOLONE 4 MG/1
TABLET ORAL
Qty: 21 TABLET | Refills: 0 | Status: SHIPPED | OUTPATIENT
Start: 2018-03-29 | End: 2018-04-13

## 2018-03-29 NOTE — PROGRESS NOTES
Assessment/Plan:    No problem-specific Assessment & Plan notes found for this encounter  Diagnoses and all orders for this visit:    Ear congestion, bilateral  after discussing risks and benefits of medication along with side effects will start a medrol dose mihai, advised to use fluticasone nasal spray and take an allergy medication without a decongestant   -     Methylprednisolone 4 MG TBPK; Use as directed on package      Follow up in 1 week if symptoms continue    Subjective:      Patient ID: Natacha Lam is a 50 y o  female  Ear Pain  Patient complains of ear pain and possible ear infection  Symptoms include bilateral ear pain  Onset of symptoms was a few days ago, and have been unchanged since that time  Associated symptoms include: non productive cough, post nasal drip and sore throat  Patient denies: achiness and chills  She is drinking plenty of fluids  The following portions of the patient's history were reviewed and updated as appropriate:   She  has no past medical history on file  She   Patient Active Problem List    Diagnosis Date Noted    Ear congestion, bilateral 03/29/2018    Carpal tunnel syndrome of right wrist 02/06/2018    Osteoarthritis of spine with radiculopathy, cervical region 02/06/2018    Cough 02/06/2018    Rhinitis, nonallergic 02/06/2018    Overweight (BMI 25 0-29 9) 04/04/2017    Elevated cholesterol 04/07/2016    Elevated liver enzymes 04/06/2016    Degenerative cervical disc 02/17/2016    Seasonal allergies 02/17/2016    Moderate persistent asthma 01/14/2014     She  has no past surgical history on file  Her family history is not on file  She  reports that she has never smoked  She has never used smokeless tobacco  She reports that she drinks alcohol  She reports that she does not use drugs    Current Outpatient Prescriptions   Medication Sig Dispense Refill    albuterol (PROVENTIL HFA,VENTOLIN HFA) 90 mcg/act inhaler Inhale 1 puff every 4 (four) hours      azelastine (ASTELIN) 0 1 % nasal spray 1 spray into each nostril 2 (two) times a day Use in each nostril as directed 30 mL 0    cholecalciferol (VITAMIN D3) 1,000 units tablet Take by mouth      fluticasone (FLONASE) 50 mcg/act nasal spray 1 spray into each nostril      fluticasone-salmeterol (ADVAIR DISKUS) 250-50 mcg/dose inhaler Inhale      Multiple Vitamins-Minerals (HM HAIR/SKIN/NAILS PO) Take by mouth      Methylprednisolone 4 MG TBPK Use as directed on package 21 tablet 0    multivitamin (THERAGRAN) TABS Take 1 tablet by mouth      promethazine-codeine (PHENERGAN WITH CODEINE) 6 25-10 mg/5 mL syrup Take 5 mL by mouth every 6 (six) hours as needed for cough 180 mL 0    ranitidine (ZANTAC) 150 mg tablet        No current facility-administered medications for this visit  Current Outpatient Prescriptions on File Prior to Visit   Medication Sig    albuterol (PROVENTIL HFA,VENTOLIN HFA) 90 mcg/act inhaler Inhale 1 puff every 4 (four) hours    azelastine (ASTELIN) 0 1 % nasal spray 1 spray into each nostril 2 (two) times a day Use in each nostril as directed    cholecalciferol (VITAMIN D3) 1,000 units tablet Take by mouth    fluticasone (FLONASE) 50 mcg/act nasal spray 1 spray into each nostril    fluticasone-salmeterol (ADVAIR DISKUS) 250-50 mcg/dose inhaler Inhale    Multiple Vitamins-Minerals (HM HAIR/SKIN/NAILS PO) Take by mouth    [DISCONTINUED] loratadine-pseudoephedrine (CLARITIN-D 24-HOUR)  mg per 24 hr tablet Take 1 tablet by mouth daily    multivitamin (THERAGRAN) TABS Take 1 tablet by mouth    promethazine-codeine (PHENERGAN WITH CODEINE) 6 25-10 mg/5 mL syrup Take 5 mL by mouth every 6 (six) hours as needed for cough    ranitidine (ZANTAC) 150 mg tablet     [DISCONTINUED] predniSONE 20 mg tablet Take 1 tablet (20 mg total) by mouth daily     No current facility-administered medications on file prior to visit  She is allergic to penicillins       Review of Systems   Constitutional: Negative for activity change, appetite change, fatigue and fever  HENT: Negative for congestion and ear discharge  Respiratory: Negative for cough and shortness of breath  Cardiovascular: Negative for chest pain and palpitations  Gastrointestinal: Negative for diarrhea and nausea  Musculoskeletal: Negative for arthralgias and back pain  Skin: Negative for color change and rash  Neurological: Negative for dizziness and headaches  Psychiatric/Behavioral: Negative for agitation and behavioral problems  Objective:      /72 (BP Location: Left arm, Patient Position: Sitting, Cuff Size: Standard)   Pulse 74   Temp 98 1 °F (36 7 °C) (Tympanic)   Resp 18   Ht 5' 6" (1 676 m)   Wt 74 8 kg (165 lb)   SpO2 98%   BMI 26 63 kg/m²          Physical Exam   Constitutional: She is oriented to person, place, and time  She appears well-developed and well-nourished  No distress  HENT:   Head: Normocephalic and atraumatic  Nose: Nose normal    Mouth/Throat: Oropharynx is clear and moist    Eyes: Conjunctivae are normal  Pupils are equal, round, and reactive to light  Cardiovascular: Normal rate, regular rhythm and normal heart sounds  No murmur heard  Pulmonary/Chest: Effort normal and breath sounds normal  No respiratory distress  She has no wheezes  Abdominal: Soft  Bowel sounds are normal  She exhibits no distension  There is no tenderness  Neurological: She is alert and oriented to person, place, and time  Skin: Skin is warm and dry  No rash noted  She is not diaphoretic  No erythema  Psychiatric: She has a normal mood and affect

## 2018-04-05 LAB
BUN SERPL-MCNC: 24 MG/DL (ref 7–25)
BUN/CREAT SERPL: ABNORMAL (CALC) (ref 6–22)
CALCIUM SERPL-MCNC: 8.9 MG/DL (ref 8.6–10.2)
CHLORIDE SERPL-SCNC: 111 MMOL/L (ref 98–110)
CHOLEST SERPL-MCNC: 209 MG/DL
CHOLEST/HDLC SERPL: 3.2 (CALC)
CO2 SERPL-SCNC: 27 MMOL/L (ref 20–31)
CREAT SERPL-MCNC: 0.79 MG/DL (ref 0.5–1.1)
GLUCOSE SERPL-MCNC: 90 MG/DL (ref 65–99)
HDLC SERPL-MCNC: 65 MG/DL
LDLC SERPL CALC-MCNC: 121 MG/DL (CALC)
NONHDLC SERPL-MCNC: 144 MG/DL (CALC)
POTASSIUM SERPL-SCNC: 4.8 MMOL/L (ref 3.5–5.3)
SL AMB EGFR AFRICAN AMERICAN: 102 ML/MIN/1.73M2
SL AMB EGFR NON AFRICAN AMERICAN: 88 ML/MIN/1.73M2
SODIUM SERPL-SCNC: 141 MMOL/L (ref 135–146)
TRIGL SERPL-MCNC: 119 MG/DL

## 2018-04-06 ENCOUNTER — TELEPHONE (OUTPATIENT)
Dept: FAMILY MEDICINE CLINIC | Facility: CLINIC | Age: 48
End: 2018-04-06

## 2018-04-06 DIAGNOSIS — E78.01 FAMILIAL HYPERCHOLESTEROLEMIA: Primary | ICD-10-CM

## 2018-04-06 RX ORDER — ATORVASTATIN CALCIUM 10 MG/1
10 TABLET, FILM COATED ORAL DAILY
Qty: 30 TABLET | Refills: 5 | Status: SHIPPED | OUTPATIENT
Start: 2018-04-06 | End: 2018-04-13 | Stop reason: CLARIF

## 2018-04-06 NOTE — TELEPHONE ENCOUNTER
Patient notified and is fine with starting a statin - LEXII Trinh   Patient would like to know when to check her labs again     ----- Message from [place] sent at 4/6/2018  8:00 AM EDT -----  Labs are showing elevated lipid panel the ldl is elevated otherwise the labs are normal would recommend considering a statin for cv protection

## 2018-04-10 ENCOUNTER — TELEPHONE (OUTPATIENT)
Dept: FAMILY MEDICINE CLINIC | Facility: CLINIC | Age: 48
End: 2018-04-10

## 2018-04-10 NOTE — TELEPHONE ENCOUNTER
I am ok with her not starting statin  It was optional  LDL is not terribly high  I am not comfortable prescribing diet pills for this   Those pills have side effects as well

## 2018-04-10 NOTE — TELEPHONE ENCOUNTER
You wanted her to start a statin for her lipids  Her ldl was around 125  After she read the papers on the statin she is hesitant to start them  She was wandering if you could erx the diet pills that dr Marline Lesch gave her last time,  she will try to control the lipids thru diet and exerscise  Please erx to ra/brod if you agree     Call pt with decision

## 2018-04-13 ENCOUNTER — OFFICE VISIT (OUTPATIENT)
Dept: FAMILY MEDICINE CLINIC | Facility: CLINIC | Age: 48
End: 2018-04-13
Payer: COMMERCIAL

## 2018-04-13 VITALS
HEIGHT: 66 IN | SYSTOLIC BLOOD PRESSURE: 122 MMHG | OXYGEN SATURATION: 98 % | DIASTOLIC BLOOD PRESSURE: 84 MMHG | WEIGHT: 170 LBS | TEMPERATURE: 97.6 F | HEART RATE: 78 BPM | BODY MASS INDEX: 27.32 KG/M2

## 2018-04-13 DIAGNOSIS — J30.2 SEASONAL ALLERGIC RHINITIS, UNSPECIFIED TRIGGER: Primary | ICD-10-CM

## 2018-04-13 DIAGNOSIS — E78.00 ELEVATED CHOLESTEROL: ICD-10-CM

## 2018-04-13 PROBLEM — R05.9 COUGH: Status: RESOLVED | Noted: 2018-02-06 | Resolved: 2018-04-13

## 2018-04-13 PROBLEM — J31.0 RHINITIS, NONALLERGIC: Status: RESOLVED | Noted: 2018-02-06 | Resolved: 2018-04-13

## 2018-04-13 PROCEDURE — 99214 OFFICE O/P EST MOD 30 MIN: CPT | Performed by: FAMILY MEDICINE

## 2018-04-13 RX ORDER — METHYLPREDNISOLONE 4 MG/1
TABLET ORAL
Qty: 21 TABLET | Refills: 0 | Status: SHIPPED | OUTPATIENT
Start: 2018-04-13 | End: 2018-05-24 | Stop reason: ALTCHOICE

## 2018-04-13 RX ORDER — MONTELUKAST SODIUM 10 MG/1
10 TABLET ORAL
Qty: 30 TABLET | Refills: 5 | Status: SHIPPED | OUTPATIENT
Start: 2018-04-13 | End: 2019-07-02 | Stop reason: ALTCHOICE

## 2018-04-13 NOTE — LETTER
April 13, 2018     Patient: Delberta Nageotte   YOB: 1970   Date of Visit: 4/13/2018       To Whom it May Concern:    Delberta Nageotte is under my professional care  She was seen in my office on 4/13/2018  If you have any questions or concerns, please don't hesitate to call           Sincerely,          Gwenlyn Schwab, MD        CC: No Recipients

## 2018-04-13 NOTE — PATIENT INSTRUCTIONS
Allergies   AMBULATORY CARE:   Allergies  are an immune system reaction to a substance called an allergen  Your immune system sees the allergen as harmful and attacks it  Common signs and symptoms include the following:   · Mild symptoms  include sneezing and a runny, itchy, or stuffy nose  You may also have swollen, watery, or itchy eyes, or skin itching  You may have swelling or pain where an insect bit or stung you  · Anaphylaxis symptoms  include trouble breathing or swallowing, a rash or hives, or severe swelling  You may also have a cough, wheezing, or feel lightheaded or dizzy  Anaphylaxis is a sudden, life-threatening reaction that needs immediate treatment  Call 911 for signs or symptoms of anaphylaxis,  such as trouble breathing, swelling in your mouth or throat, or wheezing  You may also have itching, a rash, hives, or feel like you are going to faint  Seek care immediately if:   · You have tingling in your hands or feet  · Your skin is red or flushed  Contact your healthcare provider if:   · You have questions or concerns about your condition or care  Steps to take for signs or symptoms of anaphylaxis:   · Immediately  give 1 shot of epinephrine only into the outer thigh muscle  · Leave the shot in place  as directed  Your healthcare provider may recommend you leave it in place for up to 10 seconds before you remove it  This helps make sure all of the epinephrine is delivered  · Call 911 and go to the emergency department,  even if the shot improved symptoms  Do not drive yourself  Bring the used epinephrine shot with you  Treatment for allergies  may include any of the following:  · Antihistamines  help decrease itching, sneezing, and swelling  You may take them as a pill or use drops in your nose or eyes  · Decongestants  help your nose feel less stuffy  · Steroids  decrease swelling and redness  · Topical treatments  help decrease itching or swelling   You also may be given nasal sprays or eyedrops  · Epinephrine  is medicine used to treat severe allergic reactions such as anaphylaxis  · Desensitization  gets your body used to allergens you cannot avoid  Your healthcare provider will give you a shot that contains a small amount of an allergen  He will treat any allergic reaction you have  He will give you more of the allergen a little at a time until your body gets used to it  Your reaction to the allergen may be less serious after this treatment  Your healthcare provider will tell you how long to get the shots  Safety precautions to take if you are at risk for anaphylaxis:   · Keep 2 shots of epinephrine with you at all times  You may need a second shot, because epinephrine only works for about 20 minutes and symptoms may return  Your healthcare provider can show you and family members how to give the shot  Check the expiration date every month and replace it before it expires  · Create an action plan  Your healthcare provider can help you create a written plan that explains the allergy and an emergency plan to treat a reaction  The plan explains when to give a second epinephrine shot if symptoms return or do not improve after the first  Give copies of the action plan and emergency instructions to family members, work and school staff, and  providers  Show them how to give a shot of epinephrine  · Be careful when you exercise  If you have had exercise-induced anaphylaxis, do not exercise right after you eat  Stop exercising right away if you start to develop any signs or symptoms of anaphylaxis  You may first feel tired, warm, or have itchy skin  Hives, swelling, and severe breathing problems may develop if you continue to exercise  · Carry medical alert identification  Wear medical alert jewelry or carry a card that explains the allergy  Ask your healthcare provider where to get these items  · Inform all healthcare providers of the allergy  This includes dentists, nurses, doctors, and surgeons  Manage allergies:   · Use nasal rinses as directed  Rinse with a saline solution daily to help clear your nose of allergens  · Do not smoke  Allergy symptoms may decrease if you are not around smoke  Nicotine and other chemicals in cigarettes and cigars can cause lung damage  Ask your healthcare provider for information if you currently smoke and need help to quit  E-cigarettes or smokeless tobacco still contain nicotine  Talk to your healthcare provider before you use these products  Prevent an allergic reaction:   · Do not go outside when pollen counts are high if you have seasonal allergies  Your symptoms may be better if you go outside only in the morning or evening  Use your air conditioner, and change air filters often  · Avoid dust, fur, and mold  Dust and vacuum your home often  You may want to wear a mask when you vacuum  Keep pets in certain rooms, and bathe them often  Use a dehumidifier (machine that decreases moisture) to help prevent mold  · Do not use products that contain latex if you have a latex allergy  Use nonlatex gloves if you work in healthcare or in food preparation  Always tell healthcare providers about a latex allergy  · Avoid areas that attract insects if you have an insect bite or sting allergy  Areas include trash cans, gardens, and picnics  Do not wear bright clothing or strong scents when you will be outside  Follow up with your healthcare provider as directed:  Write down your questions so you remember to ask them during your visits  When you have an allergic reaction, write down everything you were exposed to in the 2 hours before the reaction  Take that information to your next visit  © 2017 [employer] Information is for End User's use only and may not be sold, redistributed or otherwise used for commercial purposes   All illustrations and images included in CareNotes® are the copyrighted property of [employer]  or [employer]  The above information is an  only  It is not intended as medical advice for individual conditions or treatments  Talk to your doctor, nurse or pharmacist before following any medical regimen to see if it is safe and effective for you

## 2018-04-13 NOTE — PROGRESS NOTES
Assessment/Plan:     Diagnoses and all orders for this visit:    Seasonal allergic rhinitis, unspecified trigger  -     montelukast (SINGULAIR) 10 mg tablet; Take 1 tablet (10 mg total) by mouth daily at bedtime  -     Methylprednisolone 4 MG TBPK; Use as directed on package    Elevated cholesterol  -     Lipid Panel with Direct LDL reflex; Future    Other orders  -     mometasone (ASMANEX) 220 MCG/INH inhaler; Inhale 2 puffs daily        1  Allergies - will treat with Medrol pack  Add Singulair to her regimen  She is already on Flonase, Claritin D  She was advised to f/u with her allergist if symptoms continue  2  Cholesterol - she wants to do a trial of diet, exercise and not take a statin at this time  Repeat lipids in 3mo    Subjective:      Patient ID: Torrie Johnson is a 50 y o  female  She is here states "i feel like I'm underwater" and "I can't hear" - was treated 2 weeks ago with Medrol for same complaints  No fever  +nasal congestion  She has a long h/o allergies and sees an allergist Dr Jacki Douglas  She is on Claritin and Flonase every day  She is getting allergy shots  She also had high cholesterol was recommended to start taking a statin however she states her  told her not to take it  She is going to work on weight loss, diet, exercise  She denies any personal or FH of stroke or CAD  The following portions of the patient's history were reviewed and updated as appropriate:   She  has no past medical history on file    She   Patient Active Problem List    Diagnosis Date Noted    Ear congestion, bilateral 03/29/2018    Carpal tunnel syndrome of right wrist 02/06/2018    Osteoarthritis of spine with radiculopathy, cervical region 02/06/2018    Overweight (BMI 25 0-29 9) 04/04/2017    Elevated cholesterol 04/07/2016    Degenerative cervical disc 02/17/2016    Seasonal allergic rhinitis 02/17/2016    Moderate persistent asthma 01/14/2014     She  has no past surgical history on file   Her family history is not on file  She  reports that she has never smoked  She has never used smokeless tobacco  She reports that she drinks alcohol  She reports that she does not use drugs  Current Outpatient Prescriptions   Medication Sig Dispense Refill    albuterol (PROVENTIL HFA,VENTOLIN HFA) 90 mcg/act inhaler Inhale 1 puff every 4 (four) hours      cholecalciferol (VITAMIN D3) 1,000 units tablet Take by mouth      fluticasone (FLONASE) 50 mcg/act nasal spray 1 spray into each nostril      mometasone (ASMANEX) 220 MCG/INH inhaler Inhale 2 puffs daily      Multiple Vitamins-Minerals (HM HAIR/SKIN/NAILS PO) Take by mouth      multivitamin (THERAGRAN) TABS Take 1 tablet by mouth      ranitidine (ZANTAC) 150 mg tablet       Methylprednisolone 4 MG TBPK Use as directed on package 21 tablet 0    montelukast (SINGULAIR) 10 mg tablet Take 1 tablet (10 mg total) by mouth daily at bedtime 30 tablet 5     No current facility-administered medications for this visit        Current Outpatient Prescriptions on File Prior to Visit   Medication Sig    albuterol (PROVENTIL HFA,VENTOLIN HFA) 90 mcg/act inhaler Inhale 1 puff every 4 (four) hours    cholecalciferol (VITAMIN D3) 1,000 units tablet Take by mouth    fluticasone (FLONASE) 50 mcg/act nasal spray 1 spray into each nostril    Multiple Vitamins-Minerals (HM HAIR/SKIN/NAILS PO) Take by mouth    multivitamin (THERAGRAN) TABS Take 1 tablet by mouth    ranitidine (ZANTAC) 150 mg tablet     [DISCONTINUED] atorvastatin (LIPITOR) 10 mg tablet Take 1 tablet (10 mg total) by mouth daily for 180 days    [DISCONTINUED] azelastine (ASTELIN) 0 1 % nasal spray 1 spray into each nostril 2 (two) times a day Use in each nostril as directed    [DISCONTINUED] fluticasone-salmeterol (ADVAIR DISKUS) 250-50 mcg/dose inhaler Inhale    [DISCONTINUED] Methylprednisolone 4 MG TBPK Use as directed on package    [DISCONTINUED] promethazine-codeine (PHENERGAN WITH CODEINE) 6 25-10 mg/5 mL syrup Take 5 mL by mouth every 6 (six) hours as needed for cough     No current facility-administered medications on file prior to visit  She is allergic to penicillins       Review of Systems   Constitutional: Negative for activity change  HENT: Positive for congestion, ear pain, hearing loss, postnasal drip and rhinorrhea  Respiratory: Negative for chest tightness and shortness of breath  Cardiovascular: Negative for chest pain and leg swelling  Neurological: Negative for headaches  Psychiatric/Behavioral: Negative for dysphoric mood  The patient is not nervous/anxious  Objective:      /84   Pulse 78   Temp 97 6 °F (36 4 °C)   Ht 5' 6" (1 676 m)   Wt 77 1 kg (170 lb)   SpO2 98%   BMI 27 44 kg/m²          Physical Exam   Constitutional: She is oriented to person, place, and time  She appears well-developed and well-nourished  No distress  HENT:   Right Ear: Hearing, external ear and ear canal normal  A middle ear effusion is present  Left Ear: Hearing, tympanic membrane, external ear and ear canal normal   No middle ear effusion  Nose: Nose normal    Mouth/Throat: Uvula is midline  No oropharyngeal exudate or posterior oropharyngeal erythema  Eyes: Conjunctivae are normal  Pupils are equal, round, and reactive to light  Cardiovascular: Normal rate and normal heart sounds  Exam reveals no gallop and no friction rub  No murmur heard  Pulmonary/Chest: Effort normal and breath sounds normal  No respiratory distress  She has no wheezes  She has no rales  Lymphadenopathy:     She has no cervical adenopathy  Neurological: She is alert and oriented to person, place, and time  Skin: Skin is warm  No rash noted  Psychiatric: She has a normal mood and affect  Her behavior is normal  Judgment and thought content normal    Nursing note and vitals reviewed          Component      Latest Ref Rng & Units 4/5/2018   Total Cholesterol      <200 mg/dL 209 (H) SL AMB HDL CHOLESTEROL      >50 mg/dL 65   Triglycerides      <150 mg/dL 119   SL AMB LDL-CHOLESTEROL      mg/dL (calc) 121 (H)   SL AMB CHOL/HDLC RATIO      <5 0 (calc) 3 2   SL AMB NON HDL CHOLESTEROL      <130 mg/dL (calc) 144 (H)

## 2018-05-02 ENCOUNTER — OFFICE VISIT (OUTPATIENT)
Dept: FAMILY MEDICINE CLINIC | Facility: CLINIC | Age: 48
End: 2018-05-02
Payer: COMMERCIAL

## 2018-05-02 VITALS
HEIGHT: 66 IN | SYSTOLIC BLOOD PRESSURE: 122 MMHG | DIASTOLIC BLOOD PRESSURE: 80 MMHG | HEART RATE: 82 BPM | OXYGEN SATURATION: 96 % | BODY MASS INDEX: 26.93 KG/M2 | WEIGHT: 167.6 LBS

## 2018-05-02 DIAGNOSIS — F32.1 CURRENT MODERATE EPISODE OF MAJOR DEPRESSIVE DISORDER WITHOUT PRIOR EPISODE (HCC): Primary | ICD-10-CM

## 2018-05-02 DIAGNOSIS — N92.4 PERIMENOPAUSAL MENORRHAGIA: ICD-10-CM

## 2018-05-02 PROCEDURE — 99214 OFFICE O/P EST MOD 30 MIN: CPT | Performed by: NURSE PRACTITIONER

## 2018-05-02 RX ORDER — SERTRALINE HYDROCHLORIDE 25 MG/1
25 TABLET, FILM COATED ORAL DAILY
Qty: 30 TABLET | Refills: 2 | Status: SHIPPED | OUTPATIENT
Start: 2018-05-02 | End: 2018-05-17 | Stop reason: SDUPTHER

## 2018-05-02 RX ORDER — LORAZEPAM 0.5 MG/1
0.5 TABLET ORAL EVERY 8 HOURS PRN
Qty: 30 TABLET | Refills: 0 | Status: SHIPPED | OUTPATIENT
Start: 2018-05-02 | End: 2019-06-18 | Stop reason: SDUPTHER

## 2018-05-02 NOTE — PROGRESS NOTES
Assessment/Plan:    No problem-specific Assessment & Plan notes found for this encounter  Diagnoses and all orders for this visit:    Current moderate episode of major depressive disorder without prior episode ([facility] [address] )  Comments:  DW patient will start Sertraline 25 mg daily and will rto in three weeks   Orders:  -     CBC and differential; Future  -     Iron; Future  -     Comprehensive metabolic panel; Future  -     TSH, 3rd generation with T4 reflex; Future  -     sertraline (ZOLOFT) 25 mg tablet; Take 1 tablet (25 mg total) by mouth daily for 90 days  -     LORazepam (ATIVAN) 0 5 mg tablet; Take 1 tablet (0 5 mg total) by mouth every 8 (eight) hours as needed for anxiety for up to 10 days    Perimenopausal menorrhagia  -     CBC and differential; Future  -     Iron; Future  -     Comprehensive metabolic panel; Future  -     TSH, 3rd generation with T4 reflex; Future  -     LORazepam (ATIVAN) 0 5 mg tablet; Take 1 tablet (0 5 mg total) by mouth every 8 (eight) hours as needed for anxiety for up to 10 days          Subjective:      Patient ID: Khurram Aguillon is a 50 y o  female  Patient here and report that she has been having problems with crying at times for no specific reason and having crying fits that are happening on a daily basis and happening more frequently for the patient and happening in the morning when waking and throughout the day  Patient denies any past psychiatric hisotry or diagnoses and reports that this is affecting her lift she was supposed to go to [facility] [place] and cancelled her trip related to feeling emotionally upset and unable to drive to [facility] [place]  Patient denies any SI/HI and does admit to having times with short fused and snapping at times with   Denies any nightmares or flashback and reports that she is having troubles with sleeping though the night since before the holidays   Patient rpeorts that she has a d&C on 2/1/18 r/t heavy bleeding and now had a 10 day period lasting and very heavy bleeding The depression screen score PHQ9 was 16 and the GAD7 score was 5  The following portions of the patient's history were reviewed and updated as appropriate:   She  has no past medical history on file  She   Patient Active Problem List    Diagnosis Date Noted    Perimenopausal menorrhagia 05/02/2018    Current moderate episode of major depressive disorder without prior episode ([facility] [address] ) 05/02/2018    Ear congestion, bilateral 03/29/2018    Carpal tunnel syndrome of right wrist 02/06/2018    Osteoarthritis of spine with radiculopathy, cervical region 02/06/2018    Overweight (BMI 25 0-29 9) 04/04/2017    Elevated cholesterol 04/07/2016    Degenerative cervical disc 02/17/2016    Seasonal allergic rhinitis 02/17/2016    Moderate persistent asthma 01/14/2014     She  has no past surgical history on file  Her family history is not on file  She  reports that she has quit smoking  She has never used smokeless tobacco  She reports that she drinks alcohol  She reports that she does not use drugs  She is allergic to penicillins       Review of Systems   Constitutional: Positive for fatigue  Negative for activity change, appetite change, chills, diaphoresis, fever and unexpected weight change  HENT: Negative for congestion, ear pain, hearing loss, postnasal drip, sinus pain, sinus pressure, sneezing and sore throat  Eyes: Negative for pain, redness and visual disturbance  Respiratory: Negative for cough and shortness of breath  Cardiovascular: Negative for chest pain and leg swelling  Gastrointestinal: Negative for abdominal pain, diarrhea, nausea and vomiting  Musculoskeletal: Negative for arthralgias  Neurological: Negative for dizziness and light-headedness  Psychiatric/Behavioral: Positive for decreased concentration, dysphoric mood and sleep disturbance  Negative for behavioral problems  The patient is nervous/anxious            Objective:      /80   Pulse 82  5' 6" (1 676 m)   Wt 76 kg (167 lb 9 6 oz)   SpO2 96%   BMI 27 05 kg/m²          Physical Exam   Constitutional: She is oriented to person, place, and time  Vital signs are normal  She appears well-developed and well-nourished  No distress  HENT:   Head: Normocephalic and atraumatic  Eyes: Pupils are equal, round, and reactive to light  Neck: Normal range of motion  No thyromegaly present  Cardiovascular: Normal rate, regular rhythm, normal heart sounds and intact distal pulses  No murmur heard  Pulmonary/Chest: Effort normal and breath sounds normal  No respiratory distress  She has no wheezes  Abdominal: Soft  Bowel sounds are normal    Musculoskeletal: Normal range of motion  Neurological: She is alert and oriented to person, place, and time  Skin: Skin is warm and dry  Psychiatric: Her speech is normal and behavior is normal  Judgment and thought content normal  Her mood appears anxious   Cognition and memory are normal

## 2018-05-03 ENCOUNTER — TELEPHONE (OUTPATIENT)
Dept: FAMILY MEDICINE CLINIC | Facility: CLINIC | Age: 48
End: 2018-05-03

## 2018-05-03 NOTE — TELEPHONE ENCOUNTER
Dr Arjun Segal   Can you please attach diagnostic codes to the bmp that was done 4/5/18  As the screening for diabetes will not cover it  After new codes are added it needs to be submitted to : [facility] ([employer]) she received a bill for $52

## 2018-05-04 DIAGNOSIS — E66.3 OVERWEIGHT (BMI 25.0-29.9): Primary | ICD-10-CM

## 2018-05-04 NOTE — TELEPHONE ENCOUNTER
I spoke with Derina Sanders at [facility] - she added the new codes to the labs of obesity and also elevated cholestrol   They will resubmit and contact pt

## 2018-05-10 LAB
ALBUMIN SERPL-MCNC: 4 G/DL (ref 3.6–5.1)
ALBUMIN/GLOB SERPL: 1.7 (CALC) (ref 1–2.5)
ALP SERPL-CCNC: 44 U/L (ref 33–115)
ALT SERPL-CCNC: 14 U/L (ref 6–29)
AST SERPL-CCNC: 17 U/L (ref 10–35)
BASOPHILS # BLD AUTO: 62 CELLS/UL (ref 0–200)
BASOPHILS NFR BLD AUTO: 1.4 %
BILIRUB SERPL-MCNC: 0.3 MG/DL (ref 0.2–1.2)
BUN SERPL-MCNC: 22 MG/DL (ref 7–25)
BUN/CREAT SERPL: NORMAL (CALC) (ref 6–22)
CALCIUM SERPL-MCNC: 9.4 MG/DL (ref 8.6–10.2)
CHLORIDE SERPL-SCNC: 110 MMOL/L (ref 98–110)
CO2 SERPL-SCNC: 27 MMOL/L (ref 20–31)
CREAT SERPL-MCNC: 0.73 MG/DL (ref 0.5–1.1)
EOSINOPHIL # BLD AUTO: 427 CELLS/UL (ref 15–500)
EOSINOPHIL NFR BLD AUTO: 9.7 %
ERYTHROCYTE [DISTWIDTH] IN BLOOD BY AUTOMATED COUNT: 12.8 % (ref 11–15)
GLOBULIN SER CALC-MCNC: 2.4 G/DL (CALC) (ref 1.9–3.7)
GLUCOSE SERPL-MCNC: 90 MG/DL (ref 65–99)
HCT VFR BLD AUTO: 42.9 % (ref 35–45)
HGB BLD-MCNC: 14.5 G/DL (ref 11.7–15.5)
IRON SERPL-MCNC: 58 MCG/DL (ref 40–190)
LYMPHOCYTES # BLD AUTO: 1646 CELLS/UL (ref 850–3900)
LYMPHOCYTES NFR BLD AUTO: 37.4 %
MCH RBC QN AUTO: 32 PG (ref 27–33)
MCHC RBC AUTO-ENTMCNC: 33.8 G/DL (ref 32–36)
MCV RBC AUTO: 94.7 FL (ref 80–100)
MONOCYTES # BLD AUTO: 405 CELLS/UL (ref 200–950)
MONOCYTES NFR BLD AUTO: 9.2 %
NEUTROPHILS # BLD AUTO: 1861 CELLS/UL (ref 1500–7800)
NEUTROPHILS NFR BLD AUTO: 42.3 %
PLATELET # BLD AUTO: 291 THOUSAND/UL (ref 140–400)
PMV BLD REES-ECKER: 10 FL (ref 7.5–12.5)
POTASSIUM SERPL-SCNC: 4.9 MMOL/L (ref 3.5–5.3)
PROT SERPL-MCNC: 6.4 G/DL (ref 6.1–8.1)
RBC # BLD AUTO: 4.53 MILLION/UL (ref 3.8–5.1)
SL AMB EGFR AFRICAN AMERICAN: 112 ML/MIN/1.73M2
SL AMB EGFR NON AFRICAN AMERICAN: 97 ML/MIN/1.73M2
SODIUM SERPL-SCNC: 143 MMOL/L (ref 135–146)
TSH SERPL-ACNC: 2.31 MIU/L
WBC # BLD AUTO: 4.4 THOUSAND/UL (ref 3.8–10.8)

## 2018-05-17 ENCOUNTER — TELEPHONE (OUTPATIENT)
Dept: FAMILY MEDICINE CLINIC | Facility: CLINIC | Age: 48
End: 2018-05-17

## 2018-05-17 DIAGNOSIS — F32.1 CURRENT MODERATE EPISODE OF MAJOR DEPRESSIVE DISORDER WITHOUT PRIOR EPISODE (HCC): ICD-10-CM

## 2018-05-17 NOTE — TELEPHONE ENCOUNTER
Patient called and stated that she would like to talk to you about her medication you had put her on last visit

## 2018-05-24 ENCOUNTER — OFFICE VISIT (OUTPATIENT)
Dept: FAMILY MEDICINE CLINIC | Facility: CLINIC | Age: 48
End: 2018-05-24
Payer: COMMERCIAL

## 2018-05-24 VITALS
OXYGEN SATURATION: 98 % | WEIGHT: 162.4 LBS | TEMPERATURE: 97.2 F | SYSTOLIC BLOOD PRESSURE: 122 MMHG | DIASTOLIC BLOOD PRESSURE: 78 MMHG | HEIGHT: 66 IN | BODY MASS INDEX: 26.1 KG/M2 | HEART RATE: 83 BPM

## 2018-05-24 DIAGNOSIS — F32.1 CURRENT MODERATE EPISODE OF MAJOR DEPRESSIVE DISORDER WITHOUT PRIOR EPISODE (HCC): Primary | ICD-10-CM

## 2018-05-24 PROCEDURE — 99213 OFFICE O/P EST LOW 20 MIN: CPT | Performed by: NURSE PRACTITIONER

## 2018-05-24 RX ORDER — ESCITALOPRAM OXALATE 10 MG/1
10 TABLET ORAL DAILY
Qty: 30 TABLET | Refills: 2 | Status: SHIPPED | OUTPATIENT
Start: 2018-05-24 | End: 2018-06-04 | Stop reason: ALTCHOICE

## 2018-05-24 RX ORDER — CHLORAL HYDRATE 500 MG
1000 CAPSULE ORAL DAILY
COMMUNITY
End: 2022-02-07

## 2018-05-24 NOTE — PROGRESS NOTES
Assessment/Plan:    No problem-specific Assessment & Plan notes found for this encounter  Diagnoses and all orders for this visit:    Current moderate episode of major depressive disorder without prior episode [facility])  Comments:  PHQ-9 score is 11 will change to Lexapro 10 mg and follow up in 2 weeks   Orders:  -     escitalopram (LEXAPRO) 10 mg tablet; Take 1 tablet (10 mg total) by mouth daily for 90 days    Other orders  -     Omega-3 Fatty Acids (FISH OIL) 1,000 mg; Take 1,000 mg by mouth daily          Subjective:      Patient ID: Zaira Jones is a 50 y o  female  Patient here for a recheck on her symptoms and at first was feeling that the medication was helping with the symptoms and now just feeling like it is not helping like it had been  Patient is taking the Zoloft at 50 mg and the Lorazepam as needed taking once in a while  Patient continues with the crying two episodes a day lasting about 30 minutes at a time and takes time to gain control and having a cigarette  PHQ-9 is at 11  Patient increased the dose to 50 mg on the Sertraline last week and has been on the medication for the past 4 weeks and feels like the medication has plateau and not working  Patient would like to try an alternative  The following portions of the patient's history were reviewed and updated as appropriate: She  has no past medical history on file    She   Patient Active Problem List    Diagnosis Date Noted    Perimenopausal menorrhagia 05/02/2018    Current moderate episode of major depressive disorder without prior episode ([facility] [address] ) 05/02/2018    Ear congestion, bilateral 03/29/2018    Carpal tunnel syndrome of right wrist 02/06/2018    Osteoarthritis of spine with radiculopathy, cervical region 02/06/2018    Overweight (BMI 25 0-29 9) 04/04/2017    Elevated cholesterol 04/07/2016    Degenerative cervical disc 02/17/2016    Seasonal allergic rhinitis 02/17/2016    Moderate persistent asthma 01/14/2014 She  has no past surgical history on file  Her family history is not on file  She  reports that she has quit smoking  She has never used smokeless tobacco  She reports that she drinks alcohol  She reports that she does not use drugs  She is allergic to penicillins       Review of Systems   Constitutional: Negative for activity change, appetite change, chills, diaphoresis, fatigue, fever and unexpected weight change  HENT: Negative for congestion, ear pain, hearing loss, postnasal drip, sinus pain, sinus pressure, sneezing and sore throat  Eyes: Negative for pain, redness and visual disturbance  Respiratory: Negative for cough and shortness of breath  Cardiovascular: Negative for chest pain and leg swelling  Gastrointestinal: Negative for abdominal pain, diarrhea, nausea and vomiting  Musculoskeletal: Negative for arthralgias  Neurological: Negative for dizziness and light-headedness  Psychiatric/Behavioral: Positive for dysphoric mood  Negative for behavioral problems  The patient is nervous/anxious  Objective:      /78   Pulse 83   Temp (!) 97 2 °F (36 2 °C)   Ht 5' 6" (1 676 m)   Wt 73 7 kg (162 lb 6 4 oz)   SpO2 98%   BMI 26 21 kg/m²          Physical Exam   Constitutional: She is oriented to person, place, and time  Vital signs are normal  She appears well-developed and well-nourished  No distress  HENT:   Head: Normocephalic and atraumatic  Eyes: Pupils are equal, round, and reactive to light  Neck: Normal range of motion  No thyromegaly present  Cardiovascular: Normal rate, regular rhythm, normal heart sounds and intact distal pulses  No murmur heard  Pulmonary/Chest: Effort normal and breath sounds normal  No respiratory distress  She has no wheezes  Abdominal: Soft  Bowel sounds are normal    Musculoskeletal: Normal range of motion  Neurological: She is alert and oriented to person, place, and time  Skin: Skin is warm and dry     Psychiatric: Her speech is normal  Judgment and thought content normal  Cognition and memory are normal  She exhibits a depressed mood  PHQ-9 score is 11    Vitals reviewed

## 2018-06-01 ENCOUNTER — TELEPHONE (OUTPATIENT)
Dept: FAMILY MEDICINE CLINIC | Facility: CLINIC | Age: 48
End: 2018-06-01

## 2018-06-01 NOTE — TELEPHONE ENCOUNTER
Patient requested to have you call her regarding her new medication  She stated she was not able to go to work today and has been crying all day and does not think it's working       [phone]

## 2018-06-01 NOTE — TELEPHONE ENCOUNTER
Spoke with patient will continue with lexapro also discussed ok to take prn lorazepam  Patient was recent demotion at work

## 2018-06-04 ENCOUNTER — TELEPHONE (OUTPATIENT)
Dept: FAMILY MEDICINE CLINIC | Facility: CLINIC | Age: 48
End: 2018-06-04

## 2018-06-04 DIAGNOSIS — F32.2 CURRENT SEVERE EPISODE OF MAJOR DEPRESSIVE DISORDER WITHOUT PSYCHOTIC FEATURES WITHOUT PRIOR EPISODE (HCC): Primary | ICD-10-CM

## 2018-06-04 NOTE — TELEPHONE ENCOUNTER
Alanna Sainz _ Please call pt at [phone] regarding her meds and her followup from phone call with you on Friday,

## 2018-06-07 ENCOUNTER — OFFICE VISIT (OUTPATIENT)
Dept: FAMILY MEDICINE CLINIC | Facility: CLINIC | Age: 48
End: 2018-06-07
Payer: COMMERCIAL

## 2018-06-07 VITALS
SYSTOLIC BLOOD PRESSURE: 120 MMHG | OXYGEN SATURATION: 98 % | HEART RATE: 68 BPM | BODY MASS INDEX: 26.07 KG/M2 | WEIGHT: 162.2 LBS | DIASTOLIC BLOOD PRESSURE: 76 MMHG | TEMPERATURE: 98.2 F | HEIGHT: 66 IN

## 2018-06-07 DIAGNOSIS — F32.1 CURRENT MODERATE EPISODE OF MAJOR DEPRESSIVE DISORDER WITHOUT PRIOR EPISODE (HCC): Primary | ICD-10-CM

## 2018-06-07 DIAGNOSIS — F32.2 CURRENT SEVERE EPISODE OF MAJOR DEPRESSIVE DISORDER WITHOUT PSYCHOTIC FEATURES WITHOUT PRIOR EPISODE (HCC): ICD-10-CM

## 2018-06-07 PROCEDURE — 99213 OFFICE O/P EST LOW 20 MIN: CPT | Performed by: NURSE PRACTITIONER

## 2018-06-07 RX ORDER — SERTRALINE HYDROCHLORIDE 100 MG/1
100 TABLET, FILM COATED ORAL DAILY
Qty: 30 TABLET | Refills: 0 | Status: SHIPPED | OUTPATIENT
Start: 2018-06-07 | End: 2018-06-14 | Stop reason: SDUPTHER

## 2018-06-07 NOTE — PROGRESS NOTES
Assessment/Plan:    No problem-specific Assessment & Plan notes found for this encounter  Diagnoses and all orders for this visit:    Current moderate episode of major depressive disorder without prior episode ([address] )  Comments: Will increase the Sertraline to 100 mg daily and follow up in 1 week PHQ-9 scoring 19    Current severe episode of major depressive disorder without psychotic features without prior episode ([facility])  -     sertraline (ZOLOFT) 100 mg tablet; Take 1 tablet (100 mg total) by mouth daily for 30 days          Subjective:      Patient ID: Tevin Echevarria is a 50 y o  female  Patient here for a recheck on her symptoms and at first was feeling that the medication was helping with the symptoms and now just feeling like it is not helping like it had been  Patient is taking the Zoloft at 50 mg and the Lorazepam as needed taking once in a while  Patient continues with the crying two episodes a day lasting about 30 minutes at a time and takes time to gain control and having a cigarette  PHQ-9 is at 11  Patient increased the dose to 50 mg on the Sertraline last week and has been on the medication for the past 4 weeks and feels like the medication has plateau and not working  Patient would like to try an alternative  Patient had been started on Lexapro at the last visit which was 5/24/18 and had called last week and we changed back to the Zoloft as felt like with the Lexapro felt like she did not want to get out of bed  Patient is laying in bed all day and not interested in doing the things she normally does and concerns with her  and not playing with the dogs and has been to work this past week dreading going back tomorrow  The following portions of the patient's history were reviewed and updated as appropriate:   She  has no past medical history on file    She   Patient Active Problem List    Diagnosis Date Noted    Current severe episode of major depressive disorder without psychotic features without prior episode ([facility] ) 06/07/2018    Perimenopausal menorrhagia 05/02/2018    Current moderate episode of major depressive disorder without prior episode ([facility] ) 05/02/2018    Ear congestion, bilateral 03/29/2018    Carpal tunnel syndrome of right wrist 02/06/2018    Osteoarthritis of spine with radiculopathy, cervical region 02/06/2018    Overweight (BMI 25 0-29 9) 04/04/2017    Elevated cholesterol 04/07/2016    Degenerative cervical disc 02/17/2016    Seasonal allergic rhinitis 02/17/2016    Moderate persistent asthma 01/14/2014     She  has no past surgical history on file  Her family history is not on file  She  reports that she has quit smoking  She has never used smokeless tobacco  She reports that she drinks alcohol  She reports that she does not use drugs  She is allergic to penicillins       Review of Systems   Constitutional: Negative for activity change, appetite change, chills, diaphoresis, fatigue, fever and unexpected weight change  HENT: Negative for congestion, ear pain, hearing loss, postnasal drip, sinus pain, sinus pressure, sneezing and sore throat  Eyes: Negative for pain, redness and visual disturbance  Respiratory: Negative for cough and shortness of breath  Cardiovascular: Negative for chest pain and leg swelling  Gastrointestinal: Negative for abdominal pain, diarrhea, nausea and vomiting  Musculoskeletal: Negative for arthralgias  Neurological: Negative for dizziness and light-headedness  Psychiatric/Behavioral: Negative for behavioral problems and dysphoric mood  Objective:      /76   Pulse 68   Temp 98 2 °F (36 8 °C)   Ht 5' 6" (1 676 m)   Wt 73 6 kg (162 lb 3 2 oz)   SpO2 98%   BMI 26 18 kg/m²          Physical Exam   Constitutional: She is oriented to person, place, and time  Vital signs are normal  She appears well-developed and well-nourished  No distress  HENT:   Head: Normocephalic and atraumatic     Eyes: Pupils are equal, round, and reactive to light  Neck: Normal range of motion  No thyromegaly present  Cardiovascular: Normal rate, regular rhythm, normal heart sounds and intact distal pulses  No murmur heard  Pulmonary/Chest: Effort normal and breath sounds normal  No respiratory distress  She has no wheezes  Abdominal: Soft  Bowel sounds are normal    Musculoskeletal: Normal range of motion  Neurological: She is alert and oriented to person, place, and time  Skin: Skin is warm and dry  Psychiatric: Her speech is normal and behavior is normal  Judgment and thought content normal  Her mood appears anxious  Cognition and memory are normal  She exhibits a depressed mood

## 2018-06-07 NOTE — LETTER
June 7, 2018     Patient: Steffanie Powers   YOB: 1970   Date of Visit: 6/7/2018       To Whom it May Concern:    Steffanie Powers is under my professional care  She was seen in my office on 6/7/2018  She may return to work on 6/15/18  If you have any questions or concerns, please don't hesitate to call           Sincerely,          THIAGO Raya        CC: No Recipients

## 2018-06-14 ENCOUNTER — OFFICE VISIT (OUTPATIENT)
Dept: FAMILY MEDICINE CLINIC | Facility: CLINIC | Age: 48
End: 2018-06-14
Payer: COMMERCIAL

## 2018-06-14 VITALS
HEART RATE: 73 BPM | BODY MASS INDEX: 26.03 KG/M2 | TEMPERATURE: 98.4 F | HEIGHT: 66 IN | DIASTOLIC BLOOD PRESSURE: 72 MMHG | SYSTOLIC BLOOD PRESSURE: 118 MMHG | OXYGEN SATURATION: 97 % | WEIGHT: 162 LBS

## 2018-06-14 DIAGNOSIS — F32.1 CURRENT MODERATE EPISODE OF MAJOR DEPRESSIVE DISORDER WITHOUT PRIOR EPISODE (HCC): Primary | ICD-10-CM

## 2018-06-14 DIAGNOSIS — F32.2 CURRENT SEVERE EPISODE OF MAJOR DEPRESSIVE DISORDER WITHOUT PSYCHOTIC FEATURES WITHOUT PRIOR EPISODE (HCC): ICD-10-CM

## 2018-06-14 PROCEDURE — 3008F BODY MASS INDEX DOCD: CPT | Performed by: NURSE PRACTITIONER

## 2018-06-14 PROCEDURE — 99213 OFFICE O/P EST LOW 20 MIN: CPT | Performed by: NURSE PRACTITIONER

## 2018-06-14 RX ORDER — SERTRALINE HYDROCHLORIDE 100 MG/1
100 TABLET, FILM COATED ORAL DAILY
Qty: 90 TABLET | Refills: 0 | Status: SHIPPED | OUTPATIENT
Start: 2018-06-14 | End: 2018-07-24 | Stop reason: SDUPTHER

## 2018-06-14 NOTE — PROGRESS NOTES
Assessment/Plan:    No problem-specific Assessment & Plan notes found for this encounter  Diagnoses and all orders for this visit:    Current moderate episode of major depressive disorder without prior episode ([facility] )    Current severe episode of major depressive disorder without psychotic features without prior episode ([facility] )  Comments:  Patient will continue with the Sertraline 100 mg daily   Orders:  -     sertraline (ZOLOFT) 100 mg tablet; Take 1 tablet (100 mg total) by mouth daily for 90 days          Subjective:      Patient ID: Micky Johns is a 50 y o  female  Patient here for follow up on her symptoms of anxiety and depression and reports that she has noticed a connection between her work and the 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  She is planning to make changes and is supposed to go back to work tomorrow  The following portions of the patient's history were reviewed and updated as appropriate: She  has a past medical history of Moderate persistent asthma  She   Patient Active Problem List    Diagnosis Date Noted    Current severe episode of major depressive disorder without psychotic features without prior episode ([facility] ) 06/07/2018    Perimenopausal menorrhagia 05/02/2018    Current moderate episode of major depressive disorder without prior episode ([facility] ) 05/02/2018    Ear congestion, bilateral 03/29/2018    Carpal tunnel syndrome of right wrist 02/06/2018    Osteoarthritis of spine with radiculopathy, cervical region 02/06/2018    Overweight (BMI 25 0-29 9) 04/04/2017    Elevated cholesterol 04/07/2016    Degenerative cervical disc 02/17/2016    Seasonal allergic rhinitis 02/17/2016    Moderate persistent asthma 01/14/2014     She  has a past surgical history that includes Abdominoplasty and Cholecystectomy    Her family history includes Colon cancer in her paternal grandfather; Diabetes in her maternal grandmother; Hypertension in her father  She  reports that she has quit smoking  She has never used smokeless tobacco  She reports that she drinks alcohol  She reports that she does not use drugs  She is allergic to penicillins       Review of Systems   Constitutional: Negative for activity change, appetite change, chills, diaphoresis, fatigue, fever and unexpected weight change  HENT: Negative for congestion, ear pain, hearing loss, postnasal drip, sinus pain, sinus pressure, sneezing and sore throat  Eyes: Negative for pain, redness and visual disturbance  Respiratory: Negative for cough and shortness of breath  Cardiovascular: Negative for chest pain and leg swelling  Gastrointestinal: Negative for abdominal pain, diarrhea, nausea and vomiting  Musculoskeletal: Negative for arthralgias  Neurological: Negative for dizziness and light-headedness  Psychiatric/Behavioral: Negative for behavioral problems and dysphoric mood  Objective:      /72   Pulse 73   Temp 98 4 °F (36 9 °C)   Ht 5' 6" (1 676 m)   Wt 73 5 kg (162 lb)   SpO2 97%   BMI 26 15 kg/m²          Physical Exam   Constitutional: She is oriented to person, place, and time  Vital signs are normal  She appears well-developed and well-nourished  No distress  HENT:   Head: Normocephalic and atraumatic  Eyes: Pupils are equal, round, and reactive to light  Neck: Normal range of motion  No thyromegaly present  Cardiovascular: Normal rate, regular rhythm, normal heart sounds and intact distal pulses  No murmur heard  Pulmonary/Chest: Effort normal and breath sounds normal  No respiratory distress  She has no wheezes  Abdominal: Soft  Bowel sounds are normal    Musculoskeletal: Normal range of motion  Neurological: She is alert and oriented to person, place, and time  Skin: Skin is warm and dry  Psychiatric: She has a normal mood and affect  Nursing note and vitals reviewed

## 2018-07-24 ENCOUNTER — TELEPHONE (OUTPATIENT)
Dept: FAMILY MEDICINE CLINIC | Facility: CLINIC | Age: 48
End: 2018-07-24

## 2018-07-24 DIAGNOSIS — F32.2 CURRENT SEVERE EPISODE OF MAJOR DEPRESSIVE DISORDER WITHOUT PSYCHOTIC FEATURES WITHOUT PRIOR EPISODE (HCC): ICD-10-CM

## 2018-07-24 NOTE — TELEPHONE ENCOUNTER
Patient called and wanted to talk with Brie Estrada - explained to patient she is out of vacation this week  Patient just wanted to let you know that is doing well and decreased her dosage of the Zoloft to 50 mg

## 2018-07-24 NOTE — TELEPHONE ENCOUNTER
I have updated Chart to zoloft 50 mg daily  I recommend an appt with Cristela in office as well within the next 1 month to discuss

## 2018-12-05 DIAGNOSIS — F41.9 ANXIETY: Primary | ICD-10-CM

## 2018-12-05 RX ORDER — SERTRALINE HYDROCHLORIDE 100 MG/1
TABLET, FILM COATED ORAL
Qty: 90 TABLET | Refills: 0 | Status: SHIPPED | OUTPATIENT
Start: 2018-12-05 | End: 2019-06-18 | Stop reason: SDUPTHER

## 2019-06-18 ENCOUNTER — OFFICE VISIT (OUTPATIENT)
Dept: FAMILY MEDICINE CLINIC | Facility: CLINIC | Age: 49
End: 2019-06-18
Payer: COMMERCIAL

## 2019-06-18 VITALS
HEART RATE: 78 BPM | SYSTOLIC BLOOD PRESSURE: 116 MMHG | WEIGHT: 180 LBS | RESPIRATION RATE: 18 BRPM | HEIGHT: 66 IN | BODY MASS INDEX: 28.93 KG/M2 | TEMPERATURE: 97.2 F | OXYGEN SATURATION: 97 % | DIASTOLIC BLOOD PRESSURE: 72 MMHG

## 2019-06-18 DIAGNOSIS — F43.21 GRIEF AT LOSS OF CHILD: ICD-10-CM

## 2019-06-18 DIAGNOSIS — N92.4 PERIMENOPAUSAL MENORRHAGIA: ICD-10-CM

## 2019-06-18 DIAGNOSIS — F51.04 PSYCHOPHYSIOLOGICAL INSOMNIA: ICD-10-CM

## 2019-06-18 DIAGNOSIS — F41.9 ANXIETY: ICD-10-CM

## 2019-06-18 DIAGNOSIS — E78.00 ELEVATED CHOLESTEROL: ICD-10-CM

## 2019-06-18 DIAGNOSIS — M79.89 LEG SWELLING: ICD-10-CM

## 2019-06-18 DIAGNOSIS — Z12.39 SCREENING FOR BREAST CANCER: ICD-10-CM

## 2019-06-18 DIAGNOSIS — F32.2 CURRENT SEVERE EPISODE OF MAJOR DEPRESSIVE DISORDER WITHOUT PSYCHOTIC FEATURES WITHOUT PRIOR EPISODE (HCC): Primary | ICD-10-CM

## 2019-06-18 DIAGNOSIS — F32.1 CURRENT MODERATE EPISODE OF MAJOR DEPRESSIVE DISORDER WITHOUT PRIOR EPISODE (HCC): ICD-10-CM

## 2019-06-18 DIAGNOSIS — Z63.4 GRIEF AT LOSS OF CHILD: ICD-10-CM

## 2019-06-18 PROBLEM — H93.8X3 EAR CONGESTION, BILATERAL: Status: RESOLVED | Noted: 2018-03-29 | Resolved: 2019-06-18

## 2019-06-18 PROCEDURE — 99214 OFFICE O/P EST MOD 30 MIN: CPT | Performed by: NURSE PRACTITIONER

## 2019-06-18 RX ORDER — FLUTICASONE FUROATE AND VILANTEROL 200; 25 UG/1; UG/1
POWDER RESPIRATORY (INHALATION)
COMMUNITY
Start: 2019-05-08 | End: 2022-08-01 | Stop reason: ALTCHOICE

## 2019-06-18 RX ORDER — SERTRALINE HYDROCHLORIDE 100 MG/1
100 TABLET, FILM COATED ORAL DAILY
Qty: 90 TABLET | Refills: 0 | Status: SHIPPED | OUTPATIENT
Start: 2019-06-18 | End: 2019-09-10 | Stop reason: SDUPTHER

## 2019-06-18 RX ORDER — LORAZEPAM 0.5 MG/1
0.5 TABLET ORAL EVERY 8 HOURS PRN
Qty: 90 TABLET | Refills: 0 | Status: SHIPPED | OUTPATIENT
Start: 2019-06-18 | End: 2019-10-15 | Stop reason: SDUPTHER

## 2019-06-18 RX ORDER — HYDROCHLOROTHIAZIDE 12.5 MG/1
12.5 TABLET ORAL DAILY
Qty: 30 TABLET | Refills: 2 | Status: SHIPPED | OUTPATIENT
Start: 2019-06-18 | End: 2020-07-02 | Stop reason: ALTCHOICE

## 2019-06-18 SDOH — SOCIAL STABILITY - SOCIAL INSECURITY: DISSAPEARANCE AND DEATH OF FAMILY MEMBER: Z63.4

## 2019-06-25 ENCOUNTER — SOCIAL WORK (OUTPATIENT)
Dept: BEHAVIORAL/MENTAL HEALTH CLINIC | Facility: CLINIC | Age: 49
End: 2019-06-25
Payer: COMMERCIAL

## 2019-06-25 DIAGNOSIS — F32.2 CURRENT SEVERE EPISODE OF MAJOR DEPRESSIVE DISORDER WITHOUT PSYCHOTIC FEATURES WITHOUT PRIOR EPISODE (HCC): Primary | ICD-10-CM

## 2019-06-25 PROCEDURE — 90791 PSYCH DIAGNOSTIC EVALUATION: CPT | Performed by: SOCIAL WORKER

## 2019-06-27 DIAGNOSIS — E78.01 FAMILIAL HYPERCHOLESTEROLEMIA: ICD-10-CM

## 2019-06-27 DIAGNOSIS — E78.00 ELEVATED CHOLESTEROL: Primary | ICD-10-CM

## 2019-06-27 LAB
ALBUMIN SERPL-MCNC: 4.2 G/DL (ref 3.6–5.1)
ALBUMIN/GLOB SERPL: 1.6 (CALC) (ref 1–2.5)
ALP SERPL-CCNC: 60 U/L (ref 33–130)
ALT SERPL-CCNC: 21 U/L (ref 6–29)
AST SERPL-CCNC: 21 U/L (ref 10–35)
BILIRUB SERPL-MCNC: 0.5 MG/DL (ref 0.2–1.2)
BUN SERPL-MCNC: 23 MG/DL (ref 7–25)
BUN/CREAT SERPL: NORMAL (CALC) (ref 6–22)
CALCIUM SERPL-MCNC: 9.1 MG/DL (ref 8.6–10.4)
CHLORIDE SERPL-SCNC: 108 MMOL/L (ref 98–110)
CHOLEST SERPL-MCNC: 250 MG/DL
CHOLEST/HDLC SERPL: 3.3 (CALC)
CO2 SERPL-SCNC: 26 MMOL/L (ref 20–32)
CREAT SERPL-MCNC: 0.7 MG/DL (ref 0.5–1.05)
GLOBULIN SER CALC-MCNC: 2.6 G/DL (CALC) (ref 1.9–3.7)
GLUCOSE SERPL-MCNC: 91 MG/DL (ref 65–99)
HDLC SERPL-MCNC: 75 MG/DL
LDLC SERPL CALC-MCNC: 157 MG/DL (CALC)
NONHDLC SERPL-MCNC: 175 MG/DL (CALC)
POTASSIUM SERPL-SCNC: 4.6 MMOL/L (ref 3.5–5.3)
PROT SERPL-MCNC: 6.8 G/DL (ref 6.1–8.1)
SL AMB EGFR AFRICAN AMERICAN: 117 ML/MIN/1.73M2
SL AMB EGFR NON AFRICAN AMERICAN: 101 ML/MIN/1.73M2
SODIUM SERPL-SCNC: 140 MMOL/L (ref 135–146)
TRIGL SERPL-MCNC: 79 MG/DL
TSH SERPL-ACNC: 2.41 MIU/L

## 2019-06-27 RX ORDER — ROSUVASTATIN CALCIUM 5 MG/1
5 TABLET, COATED ORAL DAILY
Qty: 30 TABLET | Refills: 5 | Status: SHIPPED | OUTPATIENT
Start: 2019-06-27 | End: 2019-07-30 | Stop reason: ALTCHOICE

## 2019-06-30 DIAGNOSIS — E78.01 FAMILIAL HYPERCHOLESTEROLEMIA: Primary | ICD-10-CM

## 2019-06-30 RX ORDER — ATORVASTATIN CALCIUM 10 MG/1
10 TABLET, FILM COATED ORAL DAILY
Qty: 30 TABLET | Refills: 5 | Status: SHIPPED | OUTPATIENT
Start: 2019-06-30 | End: 2019-09-10 | Stop reason: ALTCHOICE

## 2019-07-02 ENCOUNTER — OFFICE VISIT (OUTPATIENT)
Dept: FAMILY MEDICINE CLINIC | Facility: CLINIC | Age: 49
End: 2019-07-02
Payer: COMMERCIAL

## 2019-07-02 VITALS
TEMPERATURE: 98.6 F | SYSTOLIC BLOOD PRESSURE: 126 MMHG | WEIGHT: 177 LBS | OXYGEN SATURATION: 97 % | BODY MASS INDEX: 28.45 KG/M2 | DIASTOLIC BLOOD PRESSURE: 74 MMHG | RESPIRATION RATE: 18 BRPM | HEART RATE: 78 BPM | HEIGHT: 66 IN

## 2019-07-02 DIAGNOSIS — F43.21 GRIEF AT LOSS OF CHILD: ICD-10-CM

## 2019-07-02 DIAGNOSIS — Z72.0 SMOKING TRYING TO QUIT: ICD-10-CM

## 2019-07-02 DIAGNOSIS — F32.2 CURRENT SEVERE EPISODE OF MAJOR DEPRESSIVE DISORDER WITHOUT PSYCHOTIC FEATURES WITHOUT PRIOR EPISODE (HCC): Primary | ICD-10-CM

## 2019-07-02 DIAGNOSIS — Z63.4 GRIEF AT LOSS OF CHILD: ICD-10-CM

## 2019-07-02 DIAGNOSIS — E78.00 ELEVATED CHOLESTEROL: ICD-10-CM

## 2019-07-02 PROBLEM — Z12.39 SCREENING FOR BREAST CANCER: Status: RESOLVED | Noted: 2019-06-18 | Resolved: 2019-07-02

## 2019-07-02 PROCEDURE — 3008F BODY MASS INDEX DOCD: CPT | Performed by: NURSE PRACTITIONER

## 2019-07-02 PROCEDURE — 1036F TOBACCO NON-USER: CPT | Performed by: NURSE PRACTITIONER

## 2019-07-02 PROCEDURE — 99213 OFFICE O/P EST LOW 20 MIN: CPT | Performed by: NURSE PRACTITIONER

## 2019-07-02 RX ORDER — NICOTINE 21 MG/24HR
1 PATCH, TRANSDERMAL 24 HOURS TRANSDERMAL EVERY 24 HOURS
Qty: 28 PATCH | Refills: 0 | Status: SHIPPED | OUTPATIENT
Start: 2019-07-02 | End: 2019-07-30 | Stop reason: ALTCHOICE

## 2019-07-02 SDOH — SOCIAL STABILITY - SOCIAL INSECURITY: DISSAPEARANCE AND DEATH OF FAMILY MEMBER: Z63.4

## 2019-07-02 NOTE — PROGRESS NOTES
Assessment/Plan:    No problem-specific Assessment & Plan notes found for this encounter  Diagnoses and all orders for this visit:    Current severe episode of major depressive disorder without psychotic features without prior episode [facility])  Comments:  Patient doing well on the sertraline 100 mg daily will continue     Grief at loss of child    Elevated cholesterol  Comments:  DW patient diet and exercise changes    Smoking trying to quit  Comments:  NRT patches orders placed  Orders:  -     nicotine (NICODERM CQ) 21 mg/24 hr TD 24 hr patch; Place 1 patch on the skin every 24 hours          Subjective:      Patient ID: Leatha Goode is a 46 y o  female  Patient here and reports that her 32year old son passed away suddenly on June 8, 2019 and was studying to be a vet and unknown why he passed away  Patient was 4 weeks into treatment for pancreatic cancer and passed away suddenly  Patient trying to be strong but grieving and crying and tried to jump out of a truck and was SI at that time on Saturday  Patient is not sleeping unless she drinks alcohol and is drinking lots  Patient has  Not been to therapy at this point  Patient here for her two week recheck on her medication and her symptoms worse symptoms of in the evening and in the morning  Patient reports that she is taking the Lorazepam less  Patient has noticed that she is smoking more since her sons passing  Patient is up to a pack a day with smoking  Patient also reports that she has an upcoming root canal secondary to having an infection in her gumline  The following portions of the patient's history were reviewed and updated as appropriate:   She  has a past medical history of Moderate persistent asthma    She   Patient Active Problem List    Diagnosis Date Noted    Smoking trying to quit 07/02/2019    Grief at loss of child 06/18/2019    Psychophysiological insomnia 06/18/2019    Current severe episode of major depressive disorder without psychotic features without prior episode ([facility] ) 06/07/2018    Perimenopausal menorrhagia 05/02/2018    Current moderate episode of major depressive disorder without prior episode ([facility] ) 05/02/2018    Carpal tunnel syndrome of right wrist 02/06/2018    Osteoarthritis of spine with radiculopathy, cervical region 02/06/2018    Overweight (BMI 25 0-29 9) 04/04/2017    Elevated cholesterol 04/07/2016    Degenerative cervical disc 02/17/2016    Seasonal allergic rhinitis 02/17/2016    Moderate persistent asthma 01/14/2014     She  has a past surgical history that includes Abdominoplasty and Cholecystectomy  Her family history includes Colon cancer in her paternal grandfather; Diabetes in her maternal grandmother; Hypertension in her father  She  reports that she has quit smoking  She has never used smokeless tobacco  She reports that she drinks alcohol  She reports that she does not use drugs  She is allergic to penicillins       Review of Systems   Constitutional: Negative for activity change, appetite change, chills, diaphoresis, fatigue, fever and unexpected weight change  HENT: Negative for congestion, ear pain, hearing loss, postnasal drip, sinus pressure, sinus pain, sneezing and sore throat  Eyes: Negative for pain, redness and visual disturbance  Respiratory: Negative for cough and shortness of breath  Cardiovascular: Negative for chest pain and leg swelling  Gastrointestinal: Negative for abdominal pain, diarrhea, nausea and vomiting  Musculoskeletal: Negative for arthralgias  Neurological: Negative for dizziness and light-headedness  Psychiatric/Behavioral: Negative for behavioral problems and dysphoric mood           Objective:      /74 (BP Location: Left arm, Patient Position: Sitting, Cuff Size: Adult)   Pulse 78   Temp 98 6 °F (37 °C) (Tympanic)   Resp 18   Ht 5' 6" (1 676 m)   Wt 80 3 kg (177 lb)   SpO2 97%   BMI 28 57 kg/m²          Physical Exam Constitutional: She is oriented to person, place, and time  Vital signs are normal  She appears well-developed and well-nourished  No distress  HENT:   Head: Normocephalic and atraumatic  Eyes: Pupils are equal, round, and reactive to light  Neck: Normal range of motion  No thyromegaly present  Cardiovascular: Normal rate, regular rhythm, normal heart sounds and intact distal pulses  No murmur heard  Pulmonary/Chest: Effort normal and breath sounds normal  No respiratory distress  She has no wheezes  Abdominal: Soft  Bowel sounds are normal    Musculoskeletal: Normal range of motion  Neurological: She is alert and oriented to person, place, and time  Skin: Skin is warm and dry  Psychiatric: She has a normal mood and affect  Vitals reviewed

## 2019-07-12 ENCOUNTER — DOCUMENTATION (OUTPATIENT)
Dept: PSYCHIATRY | Facility: CLINIC | Age: 49
End: 2019-07-12

## 2019-07-12 NOTE — PROGRESS NOTES
Treatment Plan done but not signed at time of office visit due to:  Leaving before signing Tx Plan at office visit 6/25/2019 , will review and sign at next OV

## 2019-07-30 ENCOUNTER — OFFICE VISIT (OUTPATIENT)
Dept: FAMILY MEDICINE CLINIC | Facility: CLINIC | Age: 49
End: 2019-07-30
Payer: COMMERCIAL

## 2019-07-30 VITALS
OXYGEN SATURATION: 97 % | BODY MASS INDEX: 29.06 KG/M2 | WEIGHT: 180.8 LBS | HEIGHT: 66 IN | DIASTOLIC BLOOD PRESSURE: 74 MMHG | TEMPERATURE: 98.5 F | RESPIRATION RATE: 18 BRPM | SYSTOLIC BLOOD PRESSURE: 126 MMHG | HEART RATE: 88 BPM

## 2019-07-30 DIAGNOSIS — F51.04 PSYCHOPHYSIOLOGICAL INSOMNIA: Primary | ICD-10-CM

## 2019-07-30 DIAGNOSIS — Z63.4 GRIEF AT LOSS OF CHILD: ICD-10-CM

## 2019-07-30 DIAGNOSIS — E66.3 OVERWEIGHT (BMI 25.0-29.9): ICD-10-CM

## 2019-07-30 DIAGNOSIS — Z72.0 SMOKING TRYING TO QUIT: ICD-10-CM

## 2019-07-30 DIAGNOSIS — F32.2 CURRENT SEVERE EPISODE OF MAJOR DEPRESSIVE DISORDER WITHOUT PSYCHOTIC FEATURES WITHOUT PRIOR EPISODE (HCC): ICD-10-CM

## 2019-07-30 DIAGNOSIS — F43.21 GRIEF AT LOSS OF CHILD: ICD-10-CM

## 2019-07-30 PROCEDURE — 99213 OFFICE O/P EST LOW 20 MIN: CPT | Performed by: NURSE PRACTITIONER

## 2019-07-30 PROCEDURE — 3008F BODY MASS INDEX DOCD: CPT | Performed by: NURSE PRACTITIONER

## 2019-07-30 PROCEDURE — 1036F TOBACCO NON-USER: CPT | Performed by: NURSE PRACTITIONER

## 2019-07-30 RX ORDER — PHENTERMINE HYDROCHLORIDE 37.5 MG/1
37.5 TABLET ORAL
Qty: 30 TABLET | Refills: 2 | Status: SHIPPED | OUTPATIENT
Start: 2019-07-30 | End: 2019-09-10 | Stop reason: ALTCHOICE

## 2019-07-30 SDOH — SOCIAL STABILITY - SOCIAL INSECURITY: DISSAPEARANCE AND DEATH OF FAMILY MEMBER: Z63.4

## 2019-07-30 NOTE — PROGRESS NOTES
Assessment/Plan:    No problem-specific Assessment & Plan notes found for this encounter  Diagnoses and all orders for this visit:    Psychophysiological insomnia  Comments:  Patient taking prn lorazepam for sleep and working well     Overweight (BMI 25 0-29  9)  Comments:  Phentermine ordered   Orders:  -     phentermine (ADIPEX-P) 37 5 MG tablet; Take 1 tablet (37 5 mg total) by mouth daily with breakfast for 90 days    Grief at loss of child  Comments:  Attending therapy weekly     Smoking trying to quit  Comments:  patient is not currently trying to quit    Current severe episode of major depressive disorder without psychotic features without prior episode ([facility] )  Comments:  sertraline 100 mg daily taking           Subjective:      Patient ID: Dougie Kenyon is a 46 y o  female  Patient here and reports that her 32year old son passed away suddenly on June 8, 2019 and was studying to be a vet and unknown why he passed away  Patient was 4 weeks into treatment for pancreatic cancer and passed away suddenly  Patient trying to be strong but grieving and crying and tried to jump out of a truck and was SI at that time on Saturday  Patient is not sleeping unless she drinks alcohol and is drinking lots  Patient has  Not been to therapy at this point       Patient here for her two week recheck on her medication and her symptoms worse symptoms of in the evening and in the morning  Patient reports that she is taking the Lorazepam less  Patient has noticed that she is smoking more since her sons passing  Patient is up to a pack a day with smoking  Patient also reports that she has an upcoming root canal secondary to having an infection in her gumline        Patient here for follow up and reports that she recently had her dental work done with three teeth had an infection and was done 7/10/19 and was eating soft foods and this has improved  Patient is currently smoking   Patient is attending therapist in [address] [place] [address],[address] and attending sessions weekly  Patient is taking the sertraline 100 mg and working well and the Lorazepam nightly for sleep  Patient does report that she is feeling like she is gaining weight and reports that she had taken hydrocut in the past and did help and had also been on the phentermine and worked for her in the past        The following portions of the patient's history were reviewed and updated as appropriate:   She  has a past medical history of Moderate persistent asthma  She   Patient Active Problem List    Diagnosis Date Noted    Smoking trying to quit 07/02/2019    Grief at loss of child 06/18/2019    Psychophysiological insomnia 06/18/2019    Current severe episode of major depressive disorder without psychotic features [facility] 06/07/2018    Perimenopausal menorrhagia 05/02/2018    Current moderate episode of major depressive disorder without prior episode ([facility] ) 05/02/2018    Carpal tunnel syndrome of right wrist 02/06/2018    Osteoarthritis of spine with radiculopathy, cervical region 02/06/2018    Overweight (BMI 25 0-29 9) 04/04/2017    Elevated cholesterol 04/07/2016    Degenerative cervical disc 02/17/2016    Seasonal allergic rhinitis 02/17/2016    Moderate persistent asthma 01/14/2014     She  has a past surgical history that includes Abdominoplasty and Cholecystectomy  Her family history includes Colon cancer in her paternal grandfather; Diabetes in her maternal grandmother; Hypertension in her father  She  reports that she has quit smoking  She has never used smokeless tobacco  She reports that she drinks alcohol  She reports that she does not use drugs  She is allergic to penicillins       Review of Systems   Constitutional: Negative for activity change, appetite change, chills, diaphoresis, fatigue, fever and unexpected weight change     HENT: Negative for congestion, ear pain, hearing loss, postnasal drip, sinus pressure, sinus pain, sneezing and sore throat  Eyes: Negative for pain, redness and visual disturbance  Respiratory: Negative for cough and shortness of breath  Cardiovascular: Negative for chest pain and leg swelling  Gastrointestinal: Negative for abdominal pain, diarrhea, nausea and vomiting  Musculoskeletal: Negative for arthralgias  Neurological: Negative for dizziness and light-headedness  Psychiatric/Behavioral: Negative for behavioral problems and dysphoric mood  Objective:      /74 (BP Location: Left arm, Patient Position: Sitting, Cuff Size: Adult)   Pulse 88   Temp 98 5 °F (36 9 °C) (Tympanic)   Resp 18   Ht 5' 6" (1 676 m)   Wt 82 kg (180 lb 12 8 oz)   SpO2 97%   BMI 29 18 kg/m²          Physical Exam   Constitutional: She is oriented to person, place, and time  Vital signs are normal  She appears well-developed and well-nourished  No distress  HENT:   Head: Normocephalic and atraumatic  Eyes: Pupils are equal, round, and reactive to light  Neck: Normal range of motion  No thyromegaly present  Cardiovascular: Normal rate, regular rhythm, normal heart sounds and intact distal pulses  No murmur heard  Pulmonary/Chest: Effort normal and breath sounds normal  No respiratory distress  She has no wheezes  Abdominal: Soft  Bowel sounds are normal    Musculoskeletal: Normal range of motion  Neurological: She is alert and oriented to person, place, and time  Skin: Skin is warm and dry  Psychiatric: She has a normal mood and affect  Nursing note and vitals reviewed

## 2019-09-10 ENCOUNTER — OFFICE VISIT (OUTPATIENT)
Dept: FAMILY MEDICINE CLINIC | Facility: CLINIC | Age: 49
End: 2019-09-10
Payer: COMMERCIAL

## 2019-09-10 VITALS
WEIGHT: 184.2 LBS | OXYGEN SATURATION: 96 % | DIASTOLIC BLOOD PRESSURE: 78 MMHG | TEMPERATURE: 98.8 F | SYSTOLIC BLOOD PRESSURE: 124 MMHG | HEIGHT: 66 IN | HEART RATE: 72 BPM | RESPIRATION RATE: 18 BRPM | BODY MASS INDEX: 29.6 KG/M2

## 2019-09-10 DIAGNOSIS — Z72.0 SMOKING TRYING TO QUIT: ICD-10-CM

## 2019-09-10 DIAGNOSIS — F32.1 CURRENT MODERATE EPISODE OF MAJOR DEPRESSIVE DISORDER WITHOUT PRIOR EPISODE (HCC): Primary | ICD-10-CM

## 2019-09-10 DIAGNOSIS — E66.3 OVERWEIGHT (BMI 25.0-29.9): ICD-10-CM

## 2019-09-10 DIAGNOSIS — F51.04 PSYCHOPHYSIOLOGICAL INSOMNIA: ICD-10-CM

## 2019-09-10 DIAGNOSIS — F41.9 ANXIETY: ICD-10-CM

## 2019-09-10 PROBLEM — F32.2 CURRENT SEVERE EPISODE OF MAJOR DEPRESSIVE DISORDER WITHOUT PSYCHOTIC FEATURES WITHOUT PRIOR EPISODE (HCC): Status: RESOLVED | Noted: 2018-06-07 | Resolved: 2019-09-10

## 2019-09-10 PROCEDURE — 3008F BODY MASS INDEX DOCD: CPT | Performed by: NURSE PRACTITIONER

## 2019-09-10 PROCEDURE — 99213 OFFICE O/P EST LOW 20 MIN: CPT | Performed by: NURSE PRACTITIONER

## 2019-09-10 NOTE — PROGRESS NOTES
Assessment/Plan:    No problem-specific Assessment & Plan notes found for this encounter  Diagnoses and all orders for this visit:    Current moderate episode of major depressive disorder without prior episode ([facility] )  Comments:  doing well on the zoloft will will decreased dose to 50 mg daily     Overweight (BMI 25 0-29  9)  Comments:  will stoip the phentermine as she has gained 4 pounds     Smoking trying to quit  Comments:  Patient is currently smoking at this point     Psychophysiological insomnia    Anxiety  Comments:  Taking the Lorazepam only prn   Orders:  -     sertraline (ZOLOFT) 50 mg tablet; Take 1 tablet (50 mg total) by mouth daily for 90 days          Subjective:      Patient ID: Franchesca Lwo is a 44 y o  female  Patient here today and reports that she has been taking the Phentermine daily and reports that she actually gained 4 pounds since her last check  Patient reports that she is doing at least 10,000 steps a day and has increased her activities and reports that she is drinking ensure shakes and small amount of protein for lunch and a sensible dinner  Patient denies any excess alcohol intake  Patient reports that her mood has been stable and reports that she is still seeing a therapy every week and is doing well with the sessions  Patient concerned that the sertraline is contributing ot her weight gain  Patient reports that she is able to work and is functioning  The following portions of the patient's history were reviewed and updated as appropriate:   She  has a past medical history of Moderate persistent asthma    She   Patient Active Problem List    Diagnosis Date Noted    Anxiety 09/10/2019    Smoking trying to quit 07/02/2019    Grief at loss of child 06/18/2019    Psychophysiological insomnia 06/18/2019    Perimenopausal menorrhagia 05/02/2018    Current moderate episode of major depressive disorder without prior episode ([facility] ) 05/02/2018    Carpal tunnel syndrome of right wrist 02/06/2018    Osteoarthritis of spine with radiculopathy, cervical region 02/06/2018    Overweight (BMI 25 0-29 9) 04/04/2017    Elevated cholesterol 04/07/2016    Degenerative cervical disc 02/17/2016    Seasonal allergic rhinitis 02/17/2016    Moderate persistent asthma 01/14/2014     She  has a past surgical history that includes Abdominoplasty and Cholecystectomy  Her family history includes Colon cancer in her paternal grandfather; Diabetes in her maternal grandmother; Hypertension in her father  She  reports that she has quit smoking  She has never used smokeless tobacco  She reports that she drinks alcohol  She reports that she does not use drugs  She is allergic to penicillins       Review of Systems   Constitutional: Negative for activity change, appetite change, chills, diaphoresis, fatigue, fever and unexpected weight change  HENT: Negative for congestion, ear pain, hearing loss, postnasal drip, sinus pressure, sinus pain, sneezing and sore throat  Eyes: Negative for pain, redness and visual disturbance  Respiratory: Negative for cough and shortness of breath  Cardiovascular: Negative for chest pain and leg swelling  Gastrointestinal: Negative for abdominal pain, diarrhea, nausea and vomiting  Endocrine: Negative  Genitourinary: Negative  Musculoskeletal: Negative for arthralgias  Allergic/Immunologic: Negative  Neurological: Negative for dizziness and light-headedness  Psychiatric/Behavioral: Negative for behavioral problems and dysphoric mood  Objective:      /78 (BP Location: Left arm, Patient Position: Sitting, Cuff Size: Adult)   Pulse 72   Temp 98 8 °F (37 1 °C) (Tympanic)   Resp 18   Ht 5' 6" (1 676 m)   Wt 83 6 kg (184 lb 3 2 oz)   SpO2 96%   BMI 29 73 kg/m²          Physical Exam   Constitutional: She is oriented to person, place, and time  Vital signs are normal  She appears well-developed and well-nourished  No distress  HENT:   Head: Normocephalic and atraumatic  Eyes: Pupils are equal, round, and reactive to light  Neck: Normal range of motion  No thyromegaly present  Cardiovascular: Normal rate, regular rhythm, normal heart sounds and intact distal pulses  No murmur heard  Pulmonary/Chest: Effort normal and breath sounds normal  No respiratory distress  She has no wheezes  Abdominal: Soft  Bowel sounds are normal    Musculoskeletal: Normal range of motion  Neurological: She is alert and oriented to person, place, and time  Skin: Skin is warm and dry  Psychiatric: She has a normal mood and affect  Nursing note and vitals reviewed

## 2019-10-15 ENCOUNTER — TELEPHONE (OUTPATIENT)
Dept: FAMILY MEDICINE CLINIC | Facility: CLINIC | Age: 49
End: 2019-10-15

## 2019-10-15 DIAGNOSIS — N92.4 PERIMENOPAUSAL MENORRHAGIA: ICD-10-CM

## 2019-10-15 DIAGNOSIS — F32.1 CURRENT MODERATE EPISODE OF MAJOR DEPRESSIVE DISORDER WITHOUT PRIOR EPISODE (HCC): ICD-10-CM

## 2019-10-15 DIAGNOSIS — F41.9 ANXIETY: ICD-10-CM

## 2019-10-15 RX ORDER — SERTRALINE HYDROCHLORIDE 100 MG/1
100 TABLET, FILM COATED ORAL DAILY
Qty: 90 TABLET | Refills: 3 | Status: SHIPPED | OUTPATIENT
Start: 2019-10-15 | End: 2020-07-02 | Stop reason: ALTCHOICE

## 2019-10-15 RX ORDER — LORAZEPAM 0.5 MG/1
0.5 TABLET ORAL EVERY 8 HOURS PRN
Qty: 90 TABLET | Refills: 0 | Status: SHIPPED | OUTPATIENT
Start: 2019-10-15 | End: 2020-09-17

## 2019-10-15 NOTE — TELEPHONE ENCOUNTER
Uche Phillips calls asking for you to call her regarding her meds  She wouldn't tell me anymore other then that   Call [phone]

## 2020-04-24 ENCOUNTER — TELEMEDICINE (OUTPATIENT)
Dept: FAMILY MEDICINE CLINIC | Facility: CLINIC | Age: 50
End: 2020-04-24
Payer: COMMERCIAL

## 2020-04-24 VITALS — WEIGHT: 196 LBS | TEMPERATURE: 99 F | BODY MASS INDEX: 31.5 KG/M2 | HEIGHT: 66 IN

## 2020-04-24 DIAGNOSIS — J45.40 MODERATE PERSISTENT ASTHMA WITHOUT COMPLICATION: ICD-10-CM

## 2020-04-24 DIAGNOSIS — Z20.828 EXPOSURE TO SARS-ASSOCIATED CORONAVIRUS: Primary | ICD-10-CM

## 2020-04-24 DIAGNOSIS — Z20.828 EXPOSURE TO SARS-ASSOCIATED CORONAVIRUS: ICD-10-CM

## 2020-04-24 PROCEDURE — 87635 SARS-COV-2 COVID-19 AMP PRB: CPT

## 2020-04-24 PROCEDURE — 99212 OFFICE O/P EST SF 10 MIN: CPT | Performed by: FAMILY MEDICINE

## 2020-04-25 ENCOUNTER — TELEPHONE (OUTPATIENT)
Dept: FAMILY MEDICINE CLINIC | Facility: CLINIC | Age: 50
End: 2020-04-25

## 2020-04-25 LAB — SARS-COV-2 RNA SPEC QL NAA+PROBE: NOT DETECTED

## 2020-04-27 ENCOUNTER — TELEMEDICINE (OUTPATIENT)
Dept: FAMILY MEDICINE CLINIC | Facility: CLINIC | Age: 50
End: 2020-04-27
Payer: COMMERCIAL

## 2020-04-27 VITALS — TEMPERATURE: 99 F

## 2020-04-27 DIAGNOSIS — R05.9 COUGH: Primary | ICD-10-CM

## 2020-04-27 PROCEDURE — 99213 OFFICE O/P EST LOW 20 MIN: CPT | Performed by: FAMILY MEDICINE

## 2020-07-02 ENCOUNTER — TELEMEDICINE (OUTPATIENT)
Dept: FAMILY MEDICINE CLINIC | Facility: CLINIC | Age: 50
End: 2020-07-02
Payer: COMMERCIAL

## 2020-07-02 VITALS — BODY MASS INDEX: 30.86 KG/M2 | WEIGHT: 192 LBS | HEIGHT: 66 IN

## 2020-07-02 DIAGNOSIS — J30.1 SEASONAL ALLERGIC RHINITIS DUE TO POLLEN: ICD-10-CM

## 2020-07-02 DIAGNOSIS — M25.562 CHRONIC ARTHRALGIAS OF KNEES AND HIPS: ICD-10-CM

## 2020-07-02 DIAGNOSIS — M25.551 CHRONIC ARTHRALGIAS OF KNEES AND HIPS: ICD-10-CM

## 2020-07-02 DIAGNOSIS — E78.00 ELEVATED CHOLESTEROL: ICD-10-CM

## 2020-07-02 DIAGNOSIS — F43.21 GRIEF AT LOSS OF CHILD: ICD-10-CM

## 2020-07-02 DIAGNOSIS — Z63.4 GRIEF AT LOSS OF CHILD: ICD-10-CM

## 2020-07-02 DIAGNOSIS — M25.552 CHRONIC ARTHRALGIAS OF KNEES AND HIPS: ICD-10-CM

## 2020-07-02 DIAGNOSIS — E66.09 CLASS 1 OBESITY DUE TO EXCESS CALORIES WITH SERIOUS COMORBIDITY AND BODY MASS INDEX (BMI) OF 30.0 TO 30.9 IN ADULT: ICD-10-CM

## 2020-07-02 DIAGNOSIS — M25.561 CHRONIC ARTHRALGIAS OF KNEES AND HIPS: ICD-10-CM

## 2020-07-02 DIAGNOSIS — F51.04 PSYCHOPHYSIOLOGICAL INSOMNIA: ICD-10-CM

## 2020-07-02 DIAGNOSIS — E78.01 FAMILIAL HYPERCHOLESTEROLEMIA: Primary | ICD-10-CM

## 2020-07-02 DIAGNOSIS — Z72.0 SMOKING TRYING TO QUIT: ICD-10-CM

## 2020-07-02 DIAGNOSIS — F41.9 ANXIETY: ICD-10-CM

## 2020-07-02 DIAGNOSIS — G89.29 CHRONIC ARTHRALGIAS OF KNEES AND HIPS: ICD-10-CM

## 2020-07-02 PROBLEM — Z20.828 EXPOSURE TO SARS-ASSOCIATED CORONAVIRUS: Status: RESOLVED | Noted: 2020-04-24 | Resolved: 2020-07-02

## 2020-07-02 PROBLEM — F32.1 CURRENT MODERATE EPISODE OF MAJOR DEPRESSIVE DISORDER WITHOUT PRIOR EPISODE (HCC): Status: RESOLVED | Noted: 2018-05-02 | Resolved: 2020-07-02

## 2020-07-02 PROBLEM — R05.9 COUGH: Status: RESOLVED | Noted: 2018-02-06 | Resolved: 2020-07-02

## 2020-07-02 PROBLEM — E66.811 CLASS 1 OBESITY DUE TO EXCESS CALORIES WITH SERIOUS COMORBIDITY AND BODY MASS INDEX (BMI) OF 30.0 TO 30.9 IN ADULT: Status: ACTIVE | Noted: 2020-07-02

## 2020-07-02 PROCEDURE — 1036F TOBACCO NON-USER: CPT | Performed by: NURSE PRACTITIONER

## 2020-07-02 PROCEDURE — 99214 OFFICE O/P EST MOD 30 MIN: CPT | Performed by: NURSE PRACTITIONER

## 2020-07-02 PROCEDURE — 3008F BODY MASS INDEX DOCD: CPT | Performed by: NURSE PRACTITIONER

## 2020-07-02 RX ORDER — PHENTERMINE HYDROCHLORIDE 37.5 MG/1
37.5 TABLET ORAL
Qty: 30 TABLET | Refills: 0 | Status: SHIPPED | OUTPATIENT
Start: 2020-07-02 | End: 2020-09-17

## 2020-07-02 SDOH — SOCIAL STABILITY - SOCIAL INSECURITY: DISSAPEARANCE AND DEATH OF FAMILY MEMBER: Z63.4

## 2020-07-02 NOTE — ASSESSMENT & PLAN NOTE
Weight management options reviewed and will trial 30 days of the Phentermine but if not effective will pursue medical weight loss options at [address] [facility]

## 2020-07-02 NOTE — PROGRESS NOTES
Virtual Regular Visit      Assessment/Plan:    Problem List Items Addressed This Visit        Respiratory    Seasonal allergic rhinitis     flonase taking daily             Other    Elevated cholesterol     Lipid panel ordered to update         Grief at loss of child     Has been 1 year since sons passing and she is doing ok          Psychophysiological insomnia    Smoking trying to quit     Smoking <1 ppd and is attempting to quit but concerns about weight gain if stopping          Anxiety     Patient is doing well off the Zoloft has been off for the past month          Relevant Orders    Comprehensive metabolic panel    CBC and differential    Familial hypercholesterolemia - Primary    Relevant Orders    Comprehensive metabolic panel    CBC and differential    Lipid Panel with Direct LDL reflex    Chronic arthralgias of knees and hips     Unclear etiology may be arthritis vs her obesity vs lyme will check labs         Relevant Orders    Lyme Antibody Profile with reflex to WB    Class 1 obesity due to excess calories with serious comorbidity and body mass index (BMI) of 30 0 to 30 9 in adult     Weight management options reviewed and will trial 30 days of the Phentermine but if not effective will pursue medical weight loss options at [facility]         Relevant Medications    phentermine (ADIPEX-P) 37 5 MG tablet               Reason for visit is   Chief Complaint   Patient presents with    Virtual Regular Visit     review medications    Virtual Regular Visit        Encounter provider THIAGO Myers    Provider located at [facility] [address] [facility] [address]      Recent Visits  No visits were found meeting these conditions     Showing recent visits within past 7 days and meeting all other requirements     Today's Visits  Date Type Provider Dept   07/02/20 Telemedicine THIAGO Myers [facility]   Showing today's visits and meeting all other requirements     Future Appointments  No visits were found meeting these conditions  Showing future appointments within next 150 days and meeting all other requirements        The patient was identified by name and date of birth  Nessa Calderon was informed that this is a telemedicine visit and that the visit is being conducted through [employer] and patient was informed that this is not a secure, HIPAA-complaint platform  She agrees to proceed     My office door was closed  No one else was in the room  She acknowledged consent and understanding of privacy and security of the video platform  The patient has agreed to participate and understands they can discontinue the visit at any time  Patient is aware this is a billable service  Subjective  Nessa Calderon is a 44 y o  female    Patient here today for her check up and reports that she weaned herself off her Zoloft was on 100 mg and has been off the medication completely for the past 4 weeks  Patient had stopped the medication and had been on over a year  Patient has lost her son over a year and reports that she is having problems with losing weight and had lost 4 pounds in a month  Patient needs to update her blood work  Patient reports that she is having some achiness in her joints knees and shoulders and feeling like her knee wants to give out no tick bites that she is aware of          Past Medical History:   Diagnosis Date    Moderate persistent asthma        Past Surgical History:   Procedure Laterality Date    ABDOMINOPLASTY      CHOLECYSTECTOMY         Current Outpatient Medications   Medication Sig Dispense Refill    albuterol (PROVENTIL HFA,VENTOLIN HFA) 90 mcg/act inhaler Inhale 1 puff every 4 (four) hours      cholecalciferol (VITAMIN D3) 1,000 units tablet Take by mouth      fluticasone (FLONASE) 50 mcg/act nasal spray 1 spray into each nostril      fluticasone-vilanterol (BREO ELLIPTA) 200-25 MCG/INH inhaler inhalation 1 puff by mouth once daily      LORazepam (ATIVAN) 0 5 mg tablet Take 1 tablet (0 5 mg total) by mouth every 8 (eight) hours as needed for anxiety 90 tablet 0    Multiple Vitamins-Minerals (HM HAIR/SKIN/NAILS PO) Take by mouth      multivitamin (THERAGRAN) TABS Take 1 tablet by mouth      Omega-3 Fatty Acids (FISH OIL) 1,000 mg Take 1,000 mg by mouth daily      ranitidine (ZANTAC) 150 mg tablet Take 50 mg by mouth daily       mometasone (ASMANEX) 220 MCG/INH inhaler Inhale 2 puffs daily      phentermine (ADIPEX-P) 37 5 MG tablet Take 1 tablet (37 5 mg total) by mouth daily with breakfast 30 tablet 0     No current facility-administered medications for this visit  Allergies   Allergen Reactions    Penicillins Shortness Of Breath       Review of Systems   Constitutional: Negative for activity change, appetite change, chills, diaphoresis, fatigue, fever and unexpected weight change  HENT: Negative for congestion, ear pain, hearing loss, postnasal drip, sinus pressure, sinus pain, sneezing and sore throat  Eyes: Negative for pain, redness and visual disturbance  Respiratory: Negative for cough and shortness of breath  Cardiovascular: Negative for chest pain and leg swelling  Gastrointestinal: Negative for abdominal pain, diarrhea, nausea and vomiting  Endocrine: Negative  Genitourinary: Negative  Musculoskeletal: Positive for arthralgias  Allergic/Immunologic: Negative  Neurological: Negative for dizziness and light-headedness  Hematological: Negative  Psychiatric/Behavioral: Negative for behavioral problems and dysphoric mood  Video Exam    Vitals:    07/02/20 0829   Weight: 87 1 kg (192 lb)   Height: 5' 6" (1 676 m)   BMI Counseling: Body mass index is 30 99 kg/m²  The BMI is above normal  Nutrition recommendations include reducing portion sizes, decreasing overall calorie intake and 3-5 servings of fruits/vegetables daily   Exercise recommendations include moderate aerobic physical activity for 150 minutes/week  Physical Exam   Constitutional: She is oriented to person, place, and time  She appears well-developed and well-nourished  HENT:   Head: Normocephalic and atraumatic  Eyes: Pupils are equal, round, and reactive to light  Neck: Normal range of motion  Cardiovascular: Normal rate  Pulmonary/Chest: Effort normal    Abdominal: Soft  Musculoskeletal: Normal range of motion  Neurological: She is alert and oriented to person, place, and time  Skin: Skin is warm and dry  Psychiatric: She has a normal mood and affect  As a result of this visit, I have not referred the patient for further respiratory evaluation  I spent 20 minutes directly with the patient during this visit      VIRTUAL VISIT DISCLAIMER    Elaine Moya acknowledges that she has consented to an online visit or consultation  She understands that the online visit is based solely on information provided by her, and that, in the absence of a face-to-face physical evaluation by the physician, the diagnosis she receives is both limited and provisional in terms of accuracy and completeness  This is not intended to replace a full medical face-to-face evaluation by the physician  Elaine Moya understands and accepts these terms

## 2020-08-05 ENCOUNTER — APPOINTMENT (OUTPATIENT)
Dept: RADIOLOGY | Facility: CLINIC | Age: 50
End: 2020-08-05
Payer: COMMERCIAL

## 2020-08-05 ENCOUNTER — OFFICE VISIT (OUTPATIENT)
Dept: FAMILY MEDICINE CLINIC | Facility: CLINIC | Age: 50
End: 2020-08-05
Payer: COMMERCIAL

## 2020-08-05 VITALS
OXYGEN SATURATION: 98 % | SYSTOLIC BLOOD PRESSURE: 132 MMHG | HEIGHT: 66 IN | HEART RATE: 80 BPM | WEIGHT: 188 LBS | DIASTOLIC BLOOD PRESSURE: 86 MMHG | TEMPERATURE: 97.1 F | BODY MASS INDEX: 30.22 KG/M2

## 2020-08-05 DIAGNOSIS — M25.561 ACUTE PAIN OF RIGHT KNEE: ICD-10-CM

## 2020-08-05 DIAGNOSIS — M25.561 ACUTE PAIN OF RIGHT KNEE: Primary | ICD-10-CM

## 2020-08-05 PROCEDURE — 3008F BODY MASS INDEX DOCD: CPT | Performed by: FAMILY MEDICINE

## 2020-08-05 PROCEDURE — 73562 X-RAY EXAM OF KNEE 3: CPT

## 2020-08-05 PROCEDURE — 1036F TOBACCO NON-USER: CPT | Performed by: FAMILY MEDICINE

## 2020-08-05 PROCEDURE — 99214 OFFICE O/P EST MOD 30 MIN: CPT | Performed by: FAMILY MEDICINE

## 2020-08-05 RX ORDER — MELOXICAM 15 MG/1
15 TABLET ORAL DAILY
Qty: 30 TABLET | Refills: 2 | Status: SHIPPED | OUTPATIENT
Start: 2020-08-05 | End: 2020-11-03

## 2020-08-05 NOTE — PROGRESS NOTES
Assessment/Plan:    No problem-specific Assessment & Plan notes found for this encounter  Diagnoses and all orders for this visit:    Acute pain of right knee  -     XR knee 3 vw right non injury; Future  -     Ambulatory referral to Orthopedic Surgery; Future  After discussing risks and benefits of medication along with side effects will start the following:  -     meloxicam (MOBIC) 15 mg tablet; Take 1 tablet (15 mg total) by mouth daily      Follow up as needed    Subjective:      Patient ID: Vivek Henley is a 44 y o  female  Knee Pain  Patient presents with knee pain involving the right knee  Onset of the symptoms was several days ago  Inciting event: none known  Current symptoms include crepitus sensation, foreign body sensation, pain located anterior knee, stiffness and swelling  Pain is aggravated by any weight bearing, going up and down stairs, lateral movements, rising after sitting, standing and walking  Patient has had no prior knee problems  Evaluation to date: none  Treatment to date: OTC analgesics which are not very effective  The following portions of the patient's history were reviewed and updated as appropriate:   She  has a past medical history of Moderate persistent asthma    She   Patient Active Problem List    Diagnosis Date Noted    Acute pain of right knee 08/05/2020    Familial hypercholesterolemia 07/02/2020    Chronic arthralgias of knees and hips 07/02/2020    Class 1 obesity due to excess calories with serious comorbidity and body mass index (BMI) of 30 0 to 30 9 in adult 07/02/2020    Anxiety 09/10/2019    Smoking trying to quit 07/02/2019    Grief at loss of child 06/18/2019    Psychophysiological insomnia 06/18/2019    Perimenopausal menorrhagia 05/02/2018    Carpal tunnel syndrome of right wrist 02/06/2018    Osteoarthritis of spine with radiculopathy, cervical region 02/06/2018    Overweight (BMI 25 0-29 9) 04/04/2017    Elevated cholesterol 04/07/2016  Degenerative cervical disc 02/17/2016    Seasonal allergic rhinitis 02/17/2016    Moderate persistent asthma 01/14/2014     She  has a past surgical history that includes Abdominoplasty and Cholecystectomy  Her family history includes Colon cancer in her paternal grandfather; Diabetes in her maternal grandmother; Hypertension in her father  She  reports that she has quit smoking  She has never used smokeless tobacco  She reports current alcohol use  She reports that she does not use drugs  Current Outpatient Medications   Medication Sig Dispense Refill    albuterol (PROVENTIL HFA,VENTOLIN HFA) 90 mcg/act inhaler Inhale 1 puff every 4 (four) hours      cholecalciferol (VITAMIN D3) 1,000 units tablet Take by mouth      fluticasone (FLONASE) 50 mcg/act nasal spray 1 spray into each nostril      fluticasone-vilanterol (BREO ELLIPTA) 200-25 MCG/INH inhaler inhalation 1 puff by mouth once daily      mometasone (ASMANEX) 220 MCG/INH inhaler Inhale 2 puffs daily      Multiple Vitamins-Minerals (HM HAIR/SKIN/NAILS PO) Take by mouth      multivitamin (THERAGRAN) TABS Take 1 tablet by mouth      Omega-3 Fatty Acids (FISH OIL) 1,000 mg Take 1,000 mg by mouth daily      phentermine (ADIPEX-P) 37 5 MG tablet Take 1 tablet (37 5 mg total) by mouth daily with breakfast 30 tablet 0    ranitidine (ZANTAC) 150 mg tablet Take 50 mg by mouth daily       LORazepam (ATIVAN) 0 5 mg tablet Take 1 tablet (0 5 mg total) by mouth every 8 (eight) hours as needed for anxiety 90 tablet 0    meloxicam (MOBIC) 15 mg tablet Take 1 tablet (15 mg total) by mouth daily 30 tablet 2     No current facility-administered medications for this visit        Current Outpatient Medications on File Prior to Visit   Medication Sig    albuterol (PROVENTIL HFA,VENTOLIN HFA) 90 mcg/act inhaler Inhale 1 puff every 4 (four) hours    cholecalciferol (VITAMIN D3) 1,000 units tablet Take by mouth    fluticasone (FLONASE) 50 mcg/act nasal spray 1 spray into each nostril    fluticasone-vilanterol (BREO ELLIPTA) 200-25 MCG/INH inhaler inhalation 1 puff by mouth once daily    mometasone (ASMANEX) 220 MCG/INH inhaler Inhale 2 puffs daily    Multiple Vitamins-Minerals (HM HAIR/SKIN/NAILS PO) Take by mouth    multivitamin (THERAGRAN) TABS Take 1 tablet by mouth    Omega-3 Fatty Acids (FISH OIL) 1,000 mg Take 1,000 mg by mouth daily    phentermine (ADIPEX-P) 37 5 MG tablet Take 1 tablet (37 5 mg total) by mouth daily with breakfast    ranitidine (ZANTAC) 150 mg tablet Take 50 mg by mouth daily     LORazepam (ATIVAN) 0 5 mg tablet Take 1 tablet (0 5 mg total) by mouth every 8 (eight) hours as needed for anxiety     No current facility-administered medications on file prior to visit  She is allergic to penicillins       Review of Systems   Constitutional: Negative for activity change, appetite change, fatigue and fever  HENT: Negative for congestion and ear discharge  Respiratory: Negative for cough and shortness of breath  Cardiovascular: Negative for chest pain and palpitations  Gastrointestinal: Negative for diarrhea and nausea  Musculoskeletal: Positive for arthralgias and myalgias  Negative for back pain  Skin: Negative for color change and rash  Neurological: Negative for dizziness and headaches  Psychiatric/Behavioral: Negative for agitation and behavioral problems  Objective:      /86   Pulse 80   Temp (!) 97 1 °F (36 2 °C)   Ht 5' 6" (1 676 m)   Wt 85 3 kg (188 lb)   SpO2 98%   BMI 30 34 kg/m²          Physical Exam   Constitutional: She is oriented to person, place, and time  She appears well-developed  No distress  HENT:   Head: Normocephalic and atraumatic  Nose: Nose normal    Eyes: Pupils are equal, round, and reactive to light  Conjunctivae are normal    Cardiovascular: Normal rate, regular rhythm and normal heart sounds  No murmur heard    Pulmonary/Chest: Effort normal and breath sounds normal  No respiratory distress  She has no wheezes  Abdominal: Soft  Bowel sounds are normal  She exhibits no distension  There is no abdominal tenderness  Musculoskeletal:         General: Tenderness present  Comments: Log roll test of right leg does not produce tenderness  Stress of the lateral aspect of the right knee produces pain no laxity  Medial stress test no pain or laxity   Neurological: She is alert and oriented to person, place, and time  Skin: Skin is warm and dry  No rash noted  She is not diaphoretic  No erythema

## 2020-08-11 ENCOUNTER — OFFICE VISIT (OUTPATIENT)
Dept: OBGYN CLINIC | Facility: CLINIC | Age: 50
End: 2020-08-11
Payer: COMMERCIAL

## 2020-08-11 VITALS
SYSTOLIC BLOOD PRESSURE: 141 MMHG | BODY MASS INDEX: 30.22 KG/M2 | DIASTOLIC BLOOD PRESSURE: 91 MMHG | WEIGHT: 188 LBS | HEART RATE: 91 BPM | HEIGHT: 66 IN

## 2020-08-11 DIAGNOSIS — M23.91 INTERNAL DERANGEMENT OF RIGHT KNEE: Primary | ICD-10-CM

## 2020-08-11 PROCEDURE — 99243 OFF/OP CNSLTJ NEW/EST LOW 30: CPT | Performed by: ORTHOPAEDIC SURGERY

## 2020-08-11 PROCEDURE — 20610 DRAIN/INJ JOINT/BURSA W/O US: CPT | Performed by: ORTHOPAEDIC SURGERY

## 2020-08-11 RX ORDER — METHYLPREDNISOLONE ACETATE 40 MG/ML
1 INJECTION, SUSPENSION INTRA-ARTICULAR; INTRALESIONAL; INTRAMUSCULAR; SOFT TISSUE
Status: COMPLETED | OUTPATIENT
Start: 2020-08-11 | End: 2020-08-11

## 2020-08-11 RX ORDER — LIDOCAINE HYDROCHLORIDE 10 MG/ML
4 INJECTION, SOLUTION INFILTRATION; PERINEURAL
Status: COMPLETED | OUTPATIENT
Start: 2020-08-11 | End: 2020-08-11

## 2020-08-11 RX ADMIN — LIDOCAINE HYDROCHLORIDE 4 ML: 10 INJECTION, SOLUTION INFILTRATION; PERINEURAL at 17:07

## 2020-08-11 RX ADMIN — METHYLPREDNISOLONE ACETATE 1 ML: 40 INJECTION, SUSPENSION INTRA-ARTICULAR; INTRALESIONAL; INTRAMUSCULAR; SOFT TISSUE at 17:07

## 2020-08-11 NOTE — PROGRESS NOTES
Patient Name:  Barbara Mas  MRN:  [id number]    Assessment & Plan     Right knee pain, possible medial meniscal tear  1  Continue NSAIDs as needed  2  Right knee corticosteroid injection administered  3  MRI right knee to evaluate medial meniscus  4  Follow up after MRI to discuss further treatment options based on these results  Chief Complaint     Right knee pain    History of the Present Illness     Barbara Mas is a 44 y o  female presents today for an orthopedic surgery consultation requested by Dr Jes Hdez for right knee pain  Patient states that the pain started about 2 weeks ago without an injury  The pain was mild initially but has become progressively worse, localizing to the medial, anterior, and lateral aspect  She reports associated swelling  Pain is exacerbated by stairs or bending  She has been taking Mobic as prescribed by her PCP with mild relief  Physical Exam     /91   Pulse 91   Ht 5' 6" (1 676 m)   Wt 85 3 kg (188 lb)   BMI 30 34 kg/m²     Right knee:  Effusion:  None  Tenderness to palpation:  Patellar tendon and medial joint line  Range of motion:  Extension: 0  Flexion:  120 limited by pain  Lachman test:  Stable  Valgus stress:  Stable  Varus stress:  Stable  Posterior drawer test:  Stable  Toni's test:  Positive    Eyes:  Anicteric sclerae  Neck:  Supple  Lungs:  Normal respiratory effort  Cardiovascular:  No lower extremity edema  Skin:  Intact without erythema  Neurologic:  Sensation intact to light touch  Psychiatric:  Mood and affect are appropriate  Data Review     I have personally reviewed pertinent films in PACS, and my interpretation follows:    XR right knee 8/11/2020:  No acute osseous abnormalities  No significant degenerative changes        Past Medical History:   Diagnosis Date    Moderate persistent asthma        Past Surgical History:   Procedure Laterality Date    ABDOMINOPLASTY      CHOLECYSTECTOMY         Allergies   Allergen Reactions    Penicillins Shortness Of Breath       Current Outpatient Medications on File Prior to Visit   Medication Sig Dispense Refill    albuterol (PROVENTIL HFA,VENTOLIN HFA) 90 mcg/act inhaler Inhale 1 puff every 4 (four) hours      cholecalciferol (VITAMIN D3) 1,000 units tablet Take by mouth      fluticasone (FLONASE) 50 mcg/act nasal spray 1 spray into each nostril      fluticasone-vilanterol (BREO ELLIPTA) 200-25 MCG/INH inhaler inhalation 1 puff by mouth once daily      LORazepam (ATIVAN) 0 5 mg tablet Take 1 tablet (0 5 mg total) by mouth every 8 (eight) hours as needed for anxiety 90 tablet 0    meloxicam (MOBIC) 15 mg tablet Take 1 tablet (15 mg total) by mouth daily 30 tablet 2    Multiple Vitamins-Minerals (HM HAIR/SKIN/NAILS PO) Take by mouth      multivitamin (THERAGRAN) TABS Take 1 tablet by mouth      Omega-3 Fatty Acids (FISH OIL) 1,000 mg Take 1,000 mg by mouth daily      phentermine (ADIPEX-P) 37 5 MG tablet Take 1 tablet (37 5 mg total) by mouth daily with breakfast 30 tablet 0    ranitidine (ZANTAC) 150 mg tablet Take 50 mg by mouth daily       [DISCONTINUED] mometasone (ASMANEX) 220 MCG/INH inhaler Inhale 2 puffs daily       No current facility-administered medications on file prior to visit  Social History     Tobacco Use    Smoking status: Former Smoker    Smokeless tobacco: Never Used   Substance Use Topics    Alcohol use: Yes     Comment: rare, social     Drug use: No       Family History   Problem Relation Age of Onset    Hypertension Father         benign essential    Diabetes Maternal Grandmother     Colon cancer Paternal Grandfather        Review of Systems     As stated in the HPI  All other systems were reviewed and are negative        Large joint arthrocentesis: R knee  Date/Time: 8/11/2020 5:07 PM  Consent given by: patient  Site marked: site marked  Timeout: Immediately prior to procedure a time out was called to verify the correct patient, procedure, equipment, support staff and site/side marked as required   Supporting Documentation  Indications: pain and diagnostic evaluation   Procedure Details  Location: knee - R knee  Preparation: Patient was prepped and draped in the usual sterile fashion  Needle size: 22 G  Ultrasound guidance: no  Approach: lateral  Medications administered: 1 mL methylPREDNISolone acetate 40 mg/mL; 4 mL lidocaine 1 %    Patient tolerance: patient tolerated the procedure well with no immediate complications  Dressing:  Sterile dressing applied        Scribe Attestation    I,:   Chris Freedman am acting as a scribe while in the presence of the attending physician :        I,:   Alline Prader, MD personally performed the services described in this documentation    as scribed in my presence :

## 2020-08-27 ENCOUNTER — HOSPITAL ENCOUNTER (OUTPATIENT)
Dept: MRI IMAGING | Facility: HOSPITAL | Age: 50
Discharge: HOME | End: 2020-08-27
Attending: ORTHOPAEDIC SURGERY
Payer: COMMERCIAL

## 2020-08-27 DIAGNOSIS — M23.91 INTERNAL DERANGEMENT OF RIGHT KNEE: ICD-10-CM

## 2020-08-27 PROCEDURE — 73721 MRI JNT OF LWR EXTRE W/O DYE: CPT

## 2020-08-27 PROCEDURE — G1004 CDSM NDSC: HCPCS

## 2020-08-28 ENCOUNTER — TELEPHONE (OUTPATIENT)
Dept: FAMILY MEDICINE CLINIC | Facility: CLINIC | Age: 50
End: 2020-08-28

## 2020-08-28 ENCOUNTER — TELEPHONE (OUTPATIENT)
Dept: ADMINISTRATIVE | Facility: OTHER | Age: 50
End: 2020-08-28

## 2020-08-28 ENCOUNTER — OFFICE VISIT (OUTPATIENT)
Dept: FAMILY MEDICINE CLINIC | Facility: CLINIC | Age: 50
End: 2020-08-28
Payer: COMMERCIAL

## 2020-08-28 VITALS
WEIGHT: 190 LBS | TEMPERATURE: 97.7 F | OXYGEN SATURATION: 97 % | HEIGHT: 66 IN | HEART RATE: 85 BPM | BODY MASS INDEX: 30.53 KG/M2 | DIASTOLIC BLOOD PRESSURE: 84 MMHG | SYSTOLIC BLOOD PRESSURE: 132 MMHG

## 2020-08-28 DIAGNOSIS — E66.09 CLASS 1 OBESITY DUE TO EXCESS CALORIES WITH SERIOUS COMORBIDITY AND BODY MASS INDEX (BMI) OF 30.0 TO 30.9 IN ADULT: ICD-10-CM

## 2020-08-28 DIAGNOSIS — E66.09 CLASS 1 OBESITY DUE TO EXCESS CALORIES WITH SERIOUS COMORBIDITY AND BODY MASS INDEX (BMI) OF 30.0 TO 30.9 IN ADULT: Primary | ICD-10-CM

## 2020-08-28 DIAGNOSIS — Z00.00 ANNUAL PHYSICAL EXAM: Primary | ICD-10-CM

## 2020-08-28 PROCEDURE — 3008F BODY MASS INDEX DOCD: CPT | Performed by: NURSE PRACTITIONER

## 2020-08-28 PROCEDURE — 99396 PREV VISIT EST AGE 40-64: CPT | Performed by: NURSE PRACTITIONER

## 2020-08-28 PROCEDURE — 1036F TOBACCO NON-USER: CPT | Performed by: NURSE PRACTITIONER

## 2020-08-28 NOTE — PROGRESS NOTES
Assessment/Plan:           Problem List Items Addressed This Visit     None            Subjective:      Patient ID: Molly Clements is a 44 y o  female  HPI    The following portions of the patient's history were reviewed and updated as appropriate:   She  has a past medical history of Moderate persistent asthma  She   Patient Active Problem List    Diagnosis Date Noted    Acute pain of right knee 08/05/2020    Familial hypercholesterolemia 07/02/2020    Chronic arthralgias of knees and hips 07/02/2020    Class 1 obesity due to excess calories with serious comorbidity and body mass index (BMI) of 30 0 to 30 9 in adult 07/02/2020    Anxiety 09/10/2019    Smoking trying to quit 07/02/2019    Grief at loss of child 06/18/2019    Psychophysiological insomnia 06/18/2019    Perimenopausal menorrhagia 05/02/2018    Carpal tunnel syndrome of right wrist 02/06/2018    Osteoarthritis of spine with radiculopathy, cervical region 02/06/2018    Overweight (BMI 25 0-29 9) 04/04/2017    Elevated cholesterol 04/07/2016    Degenerative cervical disc 02/17/2016    Seasonal allergic rhinitis 02/17/2016    Moderate persistent asthma 01/14/2014     She  has a past surgical history that includes Abdominoplasty and Cholecystectomy  Her family history includes Colon cancer in her paternal grandfather; Diabetes in her maternal grandmother; Hypertension in her father  She  reports that she has quit smoking  She has never used smokeless tobacco  She reports current alcohol use  She reports that she does not use drugs  She is allergic to penicillins       Review of Systems      Objective:      /84   Pulse 85   Temp 97 7 °F (36 5 °C) (Tympanic)   Ht 5' 6" (1 676 m)   Wt 86 2 kg (190 lb)   SpO2 97%   BMI 30 67 kg/m²          Physical Exam

## 2020-08-28 NOTE — PATIENT INSTRUCTIONS
Wellness Visit for Adults   AMBULATORY CARE:   A wellness visit  is when you see your healthcare provider to get screened for health problems  You can also get advice on how to stay healthy  Write down your questions so you remember to ask them  Ask your healthcare provider how often you should have a wellness visit  What happens at a wellness visit:  Your healthcare provider will ask about your health, and your family history of health problems  This includes high blood pressure, heart disease, and cancer  He or she will ask if you have symptoms that concern you, if you smoke, and about your mood  You may also be asked about your intake of medicines, supplements, food, and alcohol  Any of the following may be done:  · Your weight  will be checked  Your height may also be checked so your body mass index (BMI) can be calculated  Your BMI shows if you are at a healthy weight  · Your blood pressure  and heart rate will be checked  Your temperature may also be checked  · Blood and urine tests  may be done  Blood tests may be done to check your cholesterol levels  Abnormal cholesterol levels increase your risk for heart disease and stroke  You may also need a blood or urine test to check for diabetes if you are at increased risk  Urine tests may be done to look for signs of an infection or kidney disease  · A physical exam  includes checking your heartbeat and lungs with a stethoscope  Your healthcare provider may also check your skin to look for sun damage  · Screening tests  may be recommended  A screening test is done to check for diseases that may not cause symptoms  The screening tests you may need depend on your age, gender, family history, and lifestyle habits  For example, colorectal screening may be recommended if you are 48years old or older  Screening tests you need if you are a woman:   · A Pap smear  is used to screen for cervical cancer   Pap smears are usually done every 3 to 5 years depending on your age  You may need them more often if you have had abnormal Pap smear test results in the past  Ask your healthcare provider how often you should have a Pap smear  · A mammogram  is an x-ray of your breasts to screen for breast cancer  Experts recommend mammograms every 2 years starting at age 48 years  You may need a mammogram at age 52 years or younger if you have an increased risk for breast cancer  Talk to your healthcare provider about when you should start having mammograms and how often you need them  Vaccines you may need:   · Get an influenza vaccine  every year  The influenza vaccine protects you from the flu  Several types of viruses cause the flu  The viruses change over time, so new vaccines are made each year  · Get a tetanus-diphtheria (Td) booster vaccine  every 10 years  This vaccine protects you against tetanus and diphtheria  Tetanus is a severe infection that may cause painful muscle spasms and lockjaw  Diphtheria is a severe bacterial infection that causes a thick covering in the back of your mouth and throat  · Get a human papillomavirus (HPV) vaccine  if you are female and aged 23 to 32 or male 23 to 24 and never received it  This vaccine protects you from HPV infection  HPV is the most common infection spread by sexual contact  HPV may also cause vaginal, penile, and anal cancers  · Get a pneumococcal vaccine  if you are aged 72 years or older  The pneumococcal vaccine is an injection given to protect you from pneumococcal disease  Pneumococcal disease is an infection caused by pneumococcal bacteria  The infection may cause pneumonia, meningitis, or an ear infection  · Get a shingles vaccine  if you are aged 61 or older, even if you have had shingles before  The shingles vaccine is an injection to protect you from the varicella-zoster virus  This is the same virus that causes chickenpox   Shingles is a painful rash that develops in people who had chickenpox or have been exposed to the virus  How to eat healthy:  My Plate is a model for planning healthy meals  It shows the types and amounts of foods that should go on your plate  Fruits and vegetables make up about half of your plate, and grains and protein make up the other half  A serving of dairy is included on the side of your plate  The amount of calories and serving sizes you need depends on your age, gender, weight, and height  Examples of healthy foods are listed below:  · Eat a variety of vegetables  such as dark green, red, and orange vegetables  You can also include canned vegetables low in sodium (salt) and frozen vegetables without added butter or sauces  · Eat a variety of fresh fruits , canned fruit in 100% juice, frozen fruit, and dried fruit  · Include whole grains  At least half of the grains you eat should be whole grains  Examples include whole-wheat bread, wheat pasta, brown rice, and whole-grain cereals such as oatmeal     · Eat a variety of protein foods such as seafood (fish and shellfish), lean meat, and poultry without skin (turkey and chicken)  Examples of lean meats include pork leg, shoulder, or tenderloin, and beef round, sirloin, tenderloin, and extra lean ground beef  Other protein foods include eggs and egg substitutes, beans, peas, soy products, nuts, and seeds  · Choose low-fat dairy products such as skim or 1% milk or low-fat yogurt, cheese, and cottage cheese  · Limit unhealthy fats  such as butter, hard margarine, and shortening  Exercise:  Exercise at least 30 minutes per day on most days of the week  Some examples of exercise include walking, biking, dancing, and swimming  You can also fit in more physical activity by taking the stairs instead of the elevator or parking farther away from stores  Include muscle strengthening activities 2 days each week  Regular exercise provides many health benefits   It helps you manage your weight, and decreases your risk for type 2 diabetes, heart disease, stroke, and high blood pressure  Exercise can also help improve your mood  Ask your healthcare provider about the best exercise plan for you  General health and safety guidelines:   · Do not smoke  Nicotine and other chemicals in cigarettes and cigars can cause lung damage  Ask your healthcare provider for information if you currently smoke and need help to quit  E-cigarettes or smokeless tobacco still contain nicotine  Talk to your healthcare provider before you use these products  · Limit alcohol  A drink of alcohol is 12 ounces of beer, 5 ounces of wine, or 1½ ounces of liquor  · Lose weight, if needed  Being overweight increases your risk of certain health conditions  These include heart disease, high blood pressure, type 2 diabetes, and certain types of cancer  · Protect your skin  Do not sunbathe or use tanning beds  Use sunscreen with a SPF 15 or higher  Apply sunscreen at least 15 minutes before you go outside  Reapply sunscreen every 2 hours  Wear protective clothing, hats, and sunglasses when you are outside  · Drive safely  Always wear your seatbelt  Make sure everyone in your car wears a seatbelt  A seatbelt can save your life if you are in an accident  Do not use your cell phone when you are driving  This could distract you and cause an accident  Pull over if you need to make a call or send a text message  · Practice safe sex  Use latex condoms if are sexually active and have more than one partner  Your healthcare provider may recommend screening tests for sexually transmitted infections (STIs)  · Wear helmets, lifejackets, and protective gear  Always wear a helmet when you ride a bike or motorcycle, go skiing, or play sports that could cause a head injury  Wear protective equipment when you play sports  Wear a lifejacket when you are on a boat or doing water sports    © 2017 [employer] Information is for End User's use only and may not be sold, redistributed or otherwise used for commercial purposes  All illustrations and images included in CareNotes® are the copyrighted property of [employer]  or [employer]  The above information is an  only  It is not intended as medical advice for individual conditions or treatments  Talk to your doctor, nurse or pharmacist before following any medical regimen to see if it is safe and effective for you  Obesity   AMBULATORY CARE:   Obesity  is when your body mass index (BMI) is greater than 30  Your healthcare provider will use your height and weight to measure your BMI  The risks of obesity include  many health problems, such as injuries or physical disability  You may need tests to check for the following:  · Diabetes     · High blood pressure or high cholesterol     · Heart disease     · Gallbladder or liver disease     · Cancer of the colon, breast, prostate, liver, or kidney     · Sleep apnea     · Arthritis or gout  Seek care immediately if:   · You have a severe headache, confusion, or difficulty speaking  · You have weakness on one side of your body  · You have chest pain, sweating, or shortness of breath  Contact your healthcare provider if:   · You have symptoms of gallbladder or liver disease, such as pain in your upper abdomen  · You have knee or hip pain and discomfort while walking  · You have symptoms of diabetes, such as intense hunger and thirst, and frequent urination  · You have symptoms of sleep apnea, such as snoring or daytime sleepiness  · You have questions or concerns about your condition or care  Treatment for obesity  focuses on helping you lose weight to improve your health  Even a small decrease in BMI can reduce the risk for many health problems  Your healthcare provider will help you set a weight-loss goal   · Lifestyle changes  are the first step in treating obesity   These include making healthy food choices and getting regular physical activity  Your healthcare provider may suggest a weight-loss program that involves coaching, education, and therapy  · Medicine  may help you lose weight when it is used with a healthy diet and physical activity  · Surgery  can help you lose weight if you are very obese and have other health problems  There are several types of weight-loss surgery  Ask your healthcare provider for more information  Be successful losing weight:   · Set small, realistic goals  An example of a small goal is to walk for 20 minutes 5 days a week  Anther goal is to lose 5% of your body weight  · Tell friends, family members, and coworkers about your goals  and ask for their support  Ask a friend to lose weight with you, or join a weight-loss support group  · Identify foods or triggers that may cause you to overeat , and find ways to avoid them  Remove tempting high-calorie foods from your home and workplace  Place a bowl of fresh fruit on your kitchen counter  If stress causes you to eat, then find other ways to cope with stress  · Keep a diary to track what you eat and drink  Also write down how many minutes of physical activity you do each day  Weigh yourself once a week and record it in your diary  Eating changes: You will need to eat 500 to 1,000 fewer calories each day than you currently eat to lose 1 to 2 pounds a week  The following changes will help you cut calories:  · Eat smaller portions  Use small plates, no larger than 9 inches in diameter  Fill your plate half full of fruits and vegetables  Measure your food using measuring cups until you know what a serving size looks like  · Eat 3 meals and 1 or 2 snacks each day  Plan your meals in advance  T[place] and eat at home most of the time  Eat slowly  · Eat fruits and vegetables at every meal   They are low in calories and high in fiber, which makes you feel full   Do not add butter, margarine, or cream sauce to vegetables  Use herbs to season steamed vegetables  · Eat less fat and fewer fried foods  Eat more baked or grilled chicken and fish  These protein sources are lower in calories and fat than red meat  Limit fast food  Dress your salads with olive oil and vinegar instead of bottled dressing  · Limit the amount of sugar you eat  Do not drink sugary beverages  Limit alcohol  Activity changes:  Physical activity is good for your body in many ways  It helps you burn calories and build strong muscles  It decreases stress and depression, and improves your mood  It can also help you sleep better  Talk to your healthcare provider before you begin an exercise program   · Exercise for at least 30 minutes 5 days a week  Start slowly  Set aside time each day for physical activity that you enjoy and that is convenient for you  It is best to do both weight training and an activity that increases your heart rate, such as walking, bicycling, or swimming  · Find ways to be more active  Do yard work and housecleaning  Walk up the stairs instead of using elevators  Spend your leisure time going to events that require walking, such as outdoor festivals or fairs  This extra physical activity can help you lose weight and keep it off  Follow up with your healthcare provider as directed: You may need to meet with a dietitian  Write down your questions so you remember to ask them during your visits  © 2017 [employer] Information is for End User's use only and may not be sold, redistributed or otherwise used for commercial purposes  All illustrations and images included in CareNotes® are the copyrighted property of [employer]  or [employer]  The above information is an  only  It is not intended as medical advice for individual conditions or treatments   Talk to your doctor, nurse or pharmacist before following any medical regimen to see if it is safe and effective for you

## 2020-08-28 NOTE — PROGRESS NOTES
[id number] [address] [place] [address]    NAME: Elizabeth Adame  AGE: 44 y o  SEX: female  : 1970     DATE: 2020     Assessment and Plan:     Problem List Items Addressed This Visit        Other    Annual physical exam - Primary    Class 1 obesity due to excess calories with serious comorbidity and body mass index (BMI) of 30 0 to 30 9 in adult    Relevant Medications    liraglutide (SAXENDA) injection          Immunizations and preventive care screenings were discussed with patient today  Appropriate education was printed on patient's after visit summary  Counseling:  Injury prevention: discussed safety/seat belts, safety helmets, smoke detectors, carbon dioxide detectors, and smoking near bedding or upholstery  Sexual health: discussed sexually transmitted diseases, partner selection, use of condoms, avoidance of unintended pregnancy, and contraceptive alternatives  · Exercise: the importance of regular exercise/physical activity was discussed  Recommend exercise 3-5 times per week for at least 30 minutes  Return in 1 year (on 2021)  Chief Complaint:     Chief Complaint   Patient presents with    Hot Flashes      History of Present Illness:     Adult Annual Physical   Patient here for a comprehensive physical exam  The patient reports problems - Patient here for her check up  Patient reports that she is in menopause and had her labs checked and was told that she has to have a D and C because of heavy bleeding  Patient is having hot flashes and happening at odd times patient has taken black cohash in the past and had worked for her  Patient also having pain in her right knee and hard to put pressure on the knee and had a MRI and has f/u next week with orthopedics and Dr Una Etienne       Diet and Physical Activity  · Diet/Nutrition: well balanced diet  · Exercise: no formal exercise        Depression Screening  PHQ-9 Depression Screening    PHQ-9:    Frequency of the following problems over the past two weeks:            General Health  · Sleep: sleeps poorly and gets 4-6 hours of sleep on average  · Hearing: normal - bilateral   · Vision: most recent eye exam <1 year ago and wears glasses  · Dental: regular dental visits and brushes teeth twice daily  /GYN Health  · Patient is: perimenopausal  · Last menstrual period: LMP 6/29-7/12 not scheduled yet but plans for [facility]   · Contraceptive method: none  Review of Systems:     Review of Systems   Constitutional: Negative  HENT: Negative  Eyes: Negative  Respiratory: Negative  Cardiovascular: Negative  Gastrointestinal: Negative  Endocrine: Negative  Genitourinary: Negative  Musculoskeletal: Positive for arthralgias  Allergic/Immunologic: Negative  Neurological: Negative  Hematological: Negative  Psychiatric/Behavioral: Negative         Past Medical History:     Past Medical History:   Diagnosis Date    Moderate persistent asthma       Past Surgical History:     Past Surgical History:   Procedure Laterality Date    ABDOMINOPLASTY      CHOLECYSTECTOMY        Social History:        Social History     Socioeconomic History    Marital status: /Civil Union     Spouse name: None    Number of children: None    Years of education: None    Highest education level: None   Occupational History    None   Social Needs    Financial resource strain: None    Food insecurity     Worry: None     Inability: None    Transportation needs     Medical: None     Non-medical: None   Tobacco Use    Smoking status: Former Smoker    Smokeless tobacco: Never Used   Substance and Sexual Activity    Alcohol use: Yes     Comment: rare, social     Drug use: No    Sexual activity: None   Lifestyle    Physical activity     Days per week: None     Minutes per session: None    Stress: None   Relationships    Social connections     Talks on phone: None Gets together: None     Attends Church service: None     Active member of club or organization: None     Attends meetings of clubs or organizations: None     Relationship status: None    Intimate partner violence     Fear of current or ex partner: None     Emotionally abused: None     Physically abused: None     Forced sexual activity: None   Other Topics Concern    None   Social History Narrative    None      Family History:     Family History   Problem Relation Age of Onset    Hypertension Father         benign essential    Diabetes Maternal Grandmother     Colon cancer Paternal Grandfather       Current Medications:     Current Outpatient Medications   Medication Sig Dispense Refill    albuterol (PROVENTIL HFA,VENTOLIN HFA) 90 mcg/act inhaler Inhale 1 puff every 4 (four) hours      cholecalciferol (VITAMIN D3) 1,000 units tablet Take by mouth      fluticasone (FLONASE) 50 mcg/act nasal spray 1 spray into each nostril      meloxicam (MOBIC) 15 mg tablet Take 1 tablet (15 mg total) by mouth daily 30 tablet 2    Multiple Vitamins-Minerals (HM HAIR/SKIN/NAILS PO) Take by mouth      multivitamin (THERAGRAN) TABS Take 1 tablet by mouth      Nutritional Supplements (OSTEO ADVANCE PO) Take by mouth      Omega-3 Fatty Acids (FISH OIL) 1,000 mg Take 1,000 mg by mouth daily      phentermine (ADIPEX-P) 37 5 MG tablet Take 1 tablet (37 5 mg total) by mouth daily with breakfast 30 tablet 0    ranitidine (ZANTAC) 150 mg tablet Take 50 mg by mouth daily       TURMERIC PO Take by mouth      fluticasone-vilanterol (BREO ELLIPTA) 200-25 MCG/INH inhaler inhalation 1 puff by mouth once daily      liraglutide (SAXENDA) injection Inject 0 1 mL (0 6 mg total) under the skin daily for 7 days, THEN 0 2 mL (1 2 mg total) daily for 7 days, THEN 0 3 mL (1 8 mg total) daily for 7 days, THEN 0 4 mL (2 4 mg total) daily for 7 days   7 mL 0    LORazepam (ATIVAN) 0 5 mg tablet Take 1 tablet (0 5 mg total) by mouth every 8 (eight) hours as needed for anxiety 90 tablet 0     No current facility-administered medications for this visit  Allergies: Allergies   Allergen Reactions    Penicillins Shortness Of Breath      Physical Exam:     /84   Pulse 85   Temp 97 7 °F (36 5 °C) (Tympanic)   Ht 5' 6" (1 676 m)   Wt 86 2 kg (190 lb)   SpO2 97%   BMI 30 67 kg/m²     Physical Exam  Vitals signs and nursing note reviewed  Constitutional:       General: She is not in acute distress  Appearance: Normal appearance  She is normal weight  She is not ill-appearing, toxic-appearing or diaphoretic  HENT:      Head: Normocephalic and atraumatic  Right Ear: Tympanic membrane and ear canal normal       Left Ear: Tympanic membrane, ear canal and external ear normal       Nose: Nose normal       Mouth/Throat:      Mouth: Mucous membranes are moist    Eyes:      Pupils: Pupils are equal, round, and reactive to light  Neck:      Musculoskeletal: Normal range of motion  Cardiovascular:      Rate and Rhythm: Normal rate and regular rhythm  Pulses: Normal pulses  Heart sounds: Normal heart sounds  Pulmonary:      Effort: Pulmonary effort is normal       Breath sounds: Normal breath sounds  Abdominal:      Palpations: Abdomen is soft  Musculoskeletal: Normal range of motion  Skin:     General: Skin is warm and dry  Capillary Refill: Capillary refill takes less than 2 seconds  Neurological:      General: No focal deficit present  Mental Status: She is alert and oriented to person, place, and time  Psychiatric:         Mood and Affect: Mood normal          Behavior: Behavior normal          Thought Content:  Thought content normal          Judgment: Judgment normal           Lalita Galo, [address]

## 2020-08-31 DIAGNOSIS — E66.09 CLASS 1 OBESITY DUE TO EXCESS CALORIES WITH SERIOUS COMORBIDITY AND BODY MASS INDEX (BMI) OF 30.0 TO 30.9 IN ADULT: Primary | ICD-10-CM

## 2020-09-01 ENCOUNTER — OFFICE VISIT (OUTPATIENT)
Dept: OBGYN CLINIC | Facility: CLINIC | Age: 50
End: 2020-09-01
Payer: COMMERCIAL

## 2020-09-01 VITALS
HEART RATE: 92 BPM | HEIGHT: 66 IN | WEIGHT: 190 LBS | SYSTOLIC BLOOD PRESSURE: 145 MMHG | BODY MASS INDEX: 30.53 KG/M2 | DIASTOLIC BLOOD PRESSURE: 80 MMHG

## 2020-09-01 DIAGNOSIS — S83.231A COMPLEX TEAR OF MEDIAL MENISCUS OF RIGHT KNEE AS CURRENT INJURY, INITIAL ENCOUNTER: Primary | ICD-10-CM

## 2020-09-01 PROCEDURE — 99214 OFFICE O/P EST MOD 30 MIN: CPT | Performed by: ORTHOPAEDIC SURGERY

## 2020-09-01 RX ORDER — CHLORHEXIDINE GLUCONATE 4 G/100ML
SOLUTION TOPICAL DAILY PRN
Status: CANCELLED | OUTPATIENT
Start: 2020-09-01

## 2020-09-01 NOTE — PROGRESS NOTES
Patient Name:  Maggie Laboy  MRN:  [id number]    Assessment & Plan     Right knee medial meniscus tear  1  Patient notes persistent pain which affects her on a daily basis and prevents her from achieving her desired lifestyle  2  Recommend surgery consisting of right knee arthroscopic partial medial meniscectomy  Explained the procedure in detail as well as the risks and benefits and expected postoperative recovery period  She has elected to proceed  3  Follow up 10-14 days postoperatively  History of the Present Illness     42-year-old female returns to the office today for follow-up regarding her right knee  She recently underwent an MRI and is here to review the results  She still notes persistent pain localized to the medial and anterior aspect of the knee  Pain is worse with activities such as yoga  She states pain prevents her from achieving her desired lifestyle  She notes associated stiffness and weakness  She denies instability  General ROS:  Negative for fever or chills  Neurological ROS:  Negative for numbness or tingling  Physical Exam     /80   Pulse 92   Ht 5' 6" (1 676 m)   Wt 86 2 kg (190 lb)   BMI 30 67 kg/m²       Data Review     I have personally reviewed pertinent films in PACS, and my interpretation follows  MRI right knee 8/27/20:  Vertical tear posterior horn medial meniscus  Focal degenerative changes patellar apex  Counseling     The patient was counseled regarding diagnostic results, impressions, patient/family education, instructions for management, risks and benefits of treatment options, and prognosis  The total time of the encounter was 25 minutes, and more than 50% of that time was spent in counseling and coordination of care        Scribe Attestation    I,:   Taylor Ceballos PA-C am acting as a scribe while in the presence of the attending physician :        I,:   Maggy Candelaria MD personally performed the services described in this documentation as scribed in my presence :

## 2020-09-04 NOTE — TELEPHONE ENCOUNTER
Upon review of the In Basket request we were able to locate, review, and update the patient chart as requested for Mammogram and Pap Smear (HPV) aka Cervical Cancer Screening  Any additional questions or concerns should be emailed to the Practice Liaisons via [email or web address] email, please do not reply via In Basket      Thank you  Coby Story MA

## 2020-09-14 ENCOUNTER — OFFICE VISIT (OUTPATIENT)
Dept: BARIATRICS | Facility: CLINIC | Age: 50
End: 2020-09-14

## 2020-09-14 DIAGNOSIS — E66.09 CLASS 1 OBESITY DUE TO EXCESS CALORIES WITH SERIOUS COMORBIDITY AND BODY MASS INDEX (BMI) OF 30.0 TO 30.9 IN ADULT: ICD-10-CM

## 2020-09-14 DIAGNOSIS — R63.5 ABNORMAL WEIGHT GAIN: ICD-10-CM

## 2020-09-14 DIAGNOSIS — E66.09 OTHER OBESITY DUE TO EXCESS CALORIES: ICD-10-CM

## 2020-09-14 PROCEDURE — RECHECK: Performed by: FAMILY MEDICINE

## 2020-09-14 PROCEDURE — WMDI30

## 2020-09-14 NOTE — PROGRESS NOTES
Weight Management Medical Nutrition Assessment  Giselle Roblero was here today for medical meal planning  She reports that he son passed away approximately six months ago and she has been struggling with depression  She is being followed and has been taking zoloft and is also seeing a counselor  We reviewed portion sizes, healthy food choices and food logging  She may be interested in started vlcd and will be seeing Dr Rohit Gomez later this week to have blood work etc   She also asked about medication, and I recommended that she should discuss with Dr Rohit Gomez as well  Patient seen by Medical Provider in past 6 months:  no  Requested to schedule appointment with Medical Provider: No      Anthropometric Measurements  Start Weight (#): 189 6  Current Weight (#): 189 6  TBW % Change from start weight: 0%  Ideal Body Weight (#): 130 lbs  Goal Weight (#): 150 lbs    Weight Loss History  Previous weight loss attempts: Commercial Programs ([employer] Watchers, [employer]imes, etc )  Self Created Diets (Portion Control, Healthy Food Choices, etc )    Food and Nutrition Related History  Wake up: 5;30 am  Bed Time: 10 pm    Food Recall  Breakfast:coffee   Snack:greel yogurt with fruit  And granola  Lunch: hard boiled eggs  Snack:none  Dinner:vegetables, fish or chicken  Snack: none      Beverages: coffee/tea, water  Volume of beverage intake: 40 oz    Weekends: Same  Cravings: sweets  Trouble area of day:na    Frequency of Eating out: once per week  Food restrictions:none  Cooking: self   Food Shopping: self    Physical Activity Intake  Activity:none currently  Frequency:rarely  Physical limitations/barriers to exercise: none    Estimated Needs  Energy    [employer] [place] Energy Needs: BMR : 2854   1-2# loss weekly sedentary:  784 - 1284           1-2# loss weekly lightly active:1412 - 8297  Protein:71 - 89     (1 2-1 5g/kg IBW)  Fluid:  68     (35mL/kg IBW)    Nutrition Diagnosis  Yes;     Overweight/obesity  related to Excess energy intake as evidenced by  BMI more than normative standard for age and sex (obesity-grade I 26-30  9)       Nutrition Intervention    Nutrition Prescription  Calories:1000  Protein:71 - 80  Fluid:68        Nutrition Education:    Calorie controlled menu  Lean protein food choices  Healthy snack options  Food journaling tips      Nutrition Counseling:  Strategies: meal planning, portion sizes, healthy snack choices, hydration, fiber intake, protein intake, exercise, food journal      Monitoring and Evaluation:  Evaluation criteria:  Energy Intake  Meet protein needs  Maintain adequate hydration  Monitor weekly weight  Meal planning/preparation  Food journal   Decreased portions at mealtimes and snacks  Physical activity     Barriers to learning:none  Readiness to change: Action:  (Changing behavior)  Comprehension: good  Expected Compliance: good

## 2020-09-16 ENCOUNTER — TELEPHONE (OUTPATIENT)
Dept: BARIATRICS | Facility: CLINIC | Age: 50
End: 2020-09-16

## 2020-09-17 ENCOUNTER — OFFICE VISIT (OUTPATIENT)
Dept: BARIATRICS | Facility: CLINIC | Age: 50
End: 2020-09-17
Payer: COMMERCIAL

## 2020-09-17 VITALS
HEIGHT: 66 IN | WEIGHT: 190.5 LBS | SYSTOLIC BLOOD PRESSURE: 128 MMHG | DIASTOLIC BLOOD PRESSURE: 80 MMHG | TEMPERATURE: 99.1 F | BODY MASS INDEX: 30.62 KG/M2 | HEART RATE: 68 BPM

## 2020-09-17 DIAGNOSIS — J45.40 MODERATE PERSISTENT ASTHMA WITHOUT COMPLICATION: ICD-10-CM

## 2020-09-17 DIAGNOSIS — F41.9 ANXIETY AND DEPRESSION: ICD-10-CM

## 2020-09-17 DIAGNOSIS — M25.552 CHRONIC ARTHRALGIAS OF KNEES AND HIPS: ICD-10-CM

## 2020-09-17 DIAGNOSIS — E78.01 FAMILIAL HYPERCHOLESTEROLEMIA: Primary | ICD-10-CM

## 2020-09-17 DIAGNOSIS — M25.551 CHRONIC ARTHRALGIAS OF KNEES AND HIPS: ICD-10-CM

## 2020-09-17 DIAGNOSIS — G89.29 CHRONIC ARTHRALGIAS OF KNEES AND HIPS: ICD-10-CM

## 2020-09-17 DIAGNOSIS — M25.561 CHRONIC ARTHRALGIAS OF KNEES AND HIPS: ICD-10-CM

## 2020-09-17 DIAGNOSIS — E66.09 CLASS 1 OBESITY DUE TO EXCESS CALORIES WITH SERIOUS COMORBIDITY AND BODY MASS INDEX (BMI) OF 30.0 TO 30.9 IN ADULT: ICD-10-CM

## 2020-09-17 DIAGNOSIS — F32.A ANXIETY AND DEPRESSION: ICD-10-CM

## 2020-09-17 DIAGNOSIS — M25.562 CHRONIC ARTHRALGIAS OF KNEES AND HIPS: ICD-10-CM

## 2020-09-17 DIAGNOSIS — E78.00 ELEVATED CHOLESTEROL: ICD-10-CM

## 2020-09-17 PROCEDURE — 99243 OFF/OP CNSLTJ NEW/EST LOW 30: CPT | Performed by: INTERNAL MEDICINE

## 2020-09-17 RX ORDER — BUPROPION HYDROCHLORIDE 150 MG/1
TABLET, EXTENDED RELEASE ORAL
Qty: 50 TABLET | Refills: 0 | Status: SHIPPED | OUTPATIENT
Start: 2020-09-17 | End: 2020-11-12 | Stop reason: SDUPTHER

## 2020-09-17 NOTE — PROGRESS NOTES
Assessment/Plan:  Danielle Stephens was seen today for follow-up  Diagnoses and all orders for this visit:      Moderate persistent asthma without complication  Relatively well controlled  Continues to smoke, 1-3 cig/day  Smoking cessation counseling  Wellbutrin can help also    Elevated cholesterol  Familial hypercholesterolemia  Currently takes omega 3  Weight mgmt should help improve lipid levels  May have to consider statin given familial hx     Chronic arthralgias of knees and hips  Planned for KNEE ARTHROSCOPIC PARTIAL MEDIAL MENISCECTOMY (Right Knee) 10/8    Anxiety and depression  Continues to suffer due to loss of her son   Has seen a counselor before but didn't benefit  Will start buPROPion (WELLBUTRIN SR) 150 mg 12 hr tablet; Take one tab once daily for 1 week, then take one tab twice daily  Can add naltrexone if tolerating wellbutrin     Class 1 obesity due to excess calories with serious comorbidity and body mass index (BMI) of 30 0 to 30 9 in adult  -     Vitamin D 25 hydroxy; Future  -     buPROPion (WELLBUTRIN SR) 150 mg 12 hr tablet; Take one tab once daily for 1 week, then take one tab twice daily    - Discussed options of HealthyCORE-Intensive Lifestyle Intervention Program, Very Low Calorie Diet-VLCD and Conservative Program and the role of weight loss medications  - Explained the importance of making lifestyle changes first before starting any anti-obesity medications  Patient should demonstrate lifestyle changes first before anti-obesity medication can be initiated  - Patient is interested in pursuing Very Low Calorie Diet-VLCD  - Initial weight loss goal of 5-10% weight loss for improved health  - Screening labs ordered previously; pt to get them done tomorrow  Added vitamin D level  Goals:  Do not skip any meals! Food log (ie ) www myfitnesspal com,sparkpeople  com,loseit com,calorieking  com,etc  baritastic  No sugary beverages  At least 64oz of water daily    Increase physical activity by 10 minutes daily  Gradually increase physical activity to a goal of 5 days per week for 30 minutes of MODERATE intensity PLUS 2 days per week of FULL BODY resistance training  Goal protein 90g per day    45 minute visit, >50% face-to-face time spent counseling patient on surgical and nonsurgical interventions for the treatment of excess weight  Discussed the advantages and long-term outcomes with regards to bariatric surgery  Discussed in detail nonsurgical options including intensive lifestyle intervention program, very low-calorie diet program and conservative program   Discussed the role of weight loss medications  Counseled patient on diet behavior and exercise modification for weight loss  Follow up in approximately 2 weeks with Non-Surgical Dietician  Subjective:   Chief Complaint   Patient presents with    Follow-up     Patient is interested in doing VLCD  Patient ID: Stefan Win  is a 44 y o  female with excess weight/obesity here to pursue weight management  Past Medical History:   Diagnosis Date    Moderate persistent asthma     Obesity (BMI 30 0-34  9)      Past Surgical History:   Procedure Laterality Date    ABDOMINOPLASTY      CHOLECYSTECTOMY         HPI:  Obesity/Excess Weight: 38 lbs   Severity: Mild  Onset: adulthood   Modifiers: Diet and Exercise, Commercial Weight Loss Programs-ie   Weight Watchers, [employer], [employer], etc  and Prescription Weight Loss Medications - phentermine 37 5mg 1 month  Contributing factors: Depression and stress  Associated symptoms: increased joint pain  Has right knee medial meniscus tear and is getting arthroscopic partial medial meniscectomy  Having hot flashes, going through menopause sx's    B- coffee fat free [place] vanilla cream   S- protein shake (ensure max protein or glucerna high protein)  L- hard boiled eggs, cheese, grapes or turkey and cheese   S- handful of almonds   D- chicken breast 4oz, or salmon 4oz, steak 6 oz, steamed or [place] [employer]   S-   2566-9011 calories  70-80g protein     Hydration: water 32 oz, diet ginger ale   Alcohol: 1 glass of wine a week  Smokin-3 cig a day   Exercise: none due to knee pain  Sleep: 4-6 hours  STOP ban/8    The following portions of the patient's history were reviewed and updated as appropriate: allergies, current medications, past family history, past medical history, past social history, past surgical history, and problem list     Review of Systems   Constitutional: Negative for appetite change, chills and fever  HENT: Negative for rhinorrhea and sore throat  Respiratory: Negative for cough, chest tightness and shortness of breath  Cardiovascular: Negative for chest pain and leg swelling  Gastrointestinal: Negative for abdominal pain, constipation, diarrhea, nausea and vomiting  Endocrine: Negative for cold intolerance and heat intolerance  Genitourinary: Negative for difficulty urinating  Musculoskeletal: Positive for arthralgias  Skin: Negative for color change  Neurological: Negative for dizziness, numbness and headaches  Psychiatric/Behavioral: Negative for sleep disturbance  The patient is not nervous/anxious  All other systems reviewed and are negative  Objective:  /80 (BP Location: Left arm, Patient Position: Sitting, Cuff Size: Large)   Pulse 68   Temp 99 1 °F (37 3 °C) (Tympanic)   Ht 5' 5 5" (1 664 m)   Wt 86 4 kg (190 lb 8 oz)   BMI 31 22 kg/m²   Constitutional: Well-developed, well-nourished and obese Body mass index is 31 22 kg/m²  Ana Cristina Whitehead HEENT: No conjunctival pallor or jaundice  Pulmonary: No increased work of breathing or signs of respiratory distress  Clear to auscultation  CV: Normal rate and rhythm, S1 and S2, without murmurs  GI: Obese  Normal bowel sounds  Soft and nontender  MSK: No edema   Neuro: Oriented to person, place and time  Normal Speech  Normal gait  Psych: Normal affect and mood       Labs and Imaging  Reviewed Colonoscopy-Not Completed

## 2020-09-18 ENCOUNTER — APPOINTMENT (OUTPATIENT)
Dept: LAB | Facility: HOSPITAL | Age: 50
End: 2020-09-18
Payer: COMMERCIAL

## 2020-09-18 DIAGNOSIS — M25.562 CHRONIC ARTHRALGIAS OF KNEES AND HIPS: ICD-10-CM

## 2020-09-18 DIAGNOSIS — E66.09 CLASS 1 OBESITY DUE TO EXCESS CALORIES WITH SERIOUS COMORBIDITY AND BODY MASS INDEX (BMI) OF 30.0 TO 30.9 IN ADULT: ICD-10-CM

## 2020-09-18 DIAGNOSIS — M25.552 CHRONIC ARTHRALGIAS OF KNEES AND HIPS: ICD-10-CM

## 2020-09-18 DIAGNOSIS — S83.231A COMPLEX TEAR OF MEDIAL MENISCUS OF RIGHT KNEE AS CURRENT INJURY, INITIAL ENCOUNTER: ICD-10-CM

## 2020-09-18 DIAGNOSIS — F41.9 ANXIETY: ICD-10-CM

## 2020-09-18 DIAGNOSIS — G89.29 CHRONIC ARTHRALGIAS OF KNEES AND HIPS: ICD-10-CM

## 2020-09-18 DIAGNOSIS — E78.01 FAMILIAL HYPERCHOLESTEROLEMIA: ICD-10-CM

## 2020-09-18 DIAGNOSIS — M25.551 CHRONIC ARTHRALGIAS OF KNEES AND HIPS: ICD-10-CM

## 2020-09-18 DIAGNOSIS — M25.561 CHRONIC ARTHRALGIAS OF KNEES AND HIPS: ICD-10-CM

## 2020-09-18 LAB
25(OH)D3 SERPL-MCNC: 41.6 NG/ML (ref 30–100)
ALBUMIN SERPL BCP-MCNC: 3.6 G/DL (ref 3.5–5)
ALP SERPL-CCNC: 66 U/L (ref 46–116)
ALT SERPL W P-5'-P-CCNC: 30 U/L (ref 12–78)
ANION GAP SERPL CALCULATED.3IONS-SCNC: 7 MMOL/L (ref 4–13)
AST SERPL W P-5'-P-CCNC: 17 U/L (ref 5–45)
BASOPHILS # BLD AUTO: 0.06 THOUSANDS/ΜL (ref 0–0.1)
BASOPHILS NFR BLD AUTO: 1 % (ref 0–1)
BILIRUB SERPL-MCNC: 0.4 MG/DL (ref 0.2–1)
BUN SERPL-MCNC: 19 MG/DL (ref 5–25)
CALCIUM SERPL-MCNC: 9.6 MG/DL (ref 8.3–10.1)
CHLORIDE SERPL-SCNC: 107 MMOL/L (ref 100–108)
CHOLEST SERPL-MCNC: 256 MG/DL (ref 50–200)
CO2 SERPL-SCNC: 28 MMOL/L (ref 21–32)
CREAT SERPL-MCNC: 0.88 MG/DL (ref 0.6–1.3)
EOSINOPHIL # BLD AUTO: 0.38 THOUSAND/ΜL (ref 0–0.61)
EOSINOPHIL NFR BLD AUTO: 7 % (ref 0–6)
ERYTHROCYTE [DISTWIDTH] IN BLOOD BY AUTOMATED COUNT: 12.4 % (ref 11.6–15.1)
GFR SERPL CREATININE-BSD FRML MDRD: 76 ML/MIN/1.73SQ M
GLUCOSE P FAST SERPL-MCNC: 98 MG/DL (ref 65–99)
HCT VFR BLD AUTO: 42.9 % (ref 34.8–46.1)
HDLC SERPL-MCNC: 76 MG/DL
HGB BLD-MCNC: 14.3 G/DL (ref 11.5–15.4)
IMM GRANULOCYTES # BLD AUTO: 0.01 THOUSAND/UL (ref 0–0.2)
IMM GRANULOCYTES NFR BLD AUTO: 0 % (ref 0–2)
LDLC SERPL CALC-MCNC: 165 MG/DL (ref 0–100)
LYMPHOCYTES # BLD AUTO: 1.77 THOUSANDS/ΜL (ref 0.6–4.47)
LYMPHOCYTES NFR BLD AUTO: 34 % (ref 14–44)
MCH RBC QN AUTO: 31.4 PG (ref 26.8–34.3)
MCHC RBC AUTO-ENTMCNC: 33.3 G/DL (ref 31.4–37.4)
MCV RBC AUTO: 94 FL (ref 82–98)
MONOCYTES # BLD AUTO: 0.38 THOUSAND/ΜL (ref 0.17–1.22)
MONOCYTES NFR BLD AUTO: 7 % (ref 4–12)
NEUTROPHILS # BLD AUTO: 2.54 THOUSANDS/ΜL (ref 1.85–7.62)
NEUTS SEG NFR BLD AUTO: 51 % (ref 43–75)
NRBC BLD AUTO-RTO: 0 /100 WBCS
PLATELET # BLD AUTO: 271 THOUSANDS/UL (ref 149–390)
PMV BLD AUTO: 9.7 FL (ref 8.9–12.7)
POTASSIUM SERPL-SCNC: 4.3 MMOL/L (ref 3.5–5.3)
PROT SERPL-MCNC: 7.8 G/DL (ref 6.4–8.2)
RBC # BLD AUTO: 4.55 MILLION/UL (ref 3.81–5.12)
SODIUM SERPL-SCNC: 142 MMOL/L (ref 136–145)
TRIGL SERPL-MCNC: 74 MG/DL
WBC # BLD AUTO: 5.14 THOUSAND/UL (ref 4.31–10.16)

## 2020-09-18 PROCEDURE — 86618 LYME DISEASE ANTIBODY: CPT

## 2020-09-18 PROCEDURE — 80061 LIPID PANEL: CPT

## 2020-09-18 PROCEDURE — 82306 VITAMIN D 25 HYDROXY: CPT

## 2020-09-18 PROCEDURE — 80053 COMPREHEN METABOLIC PANEL: CPT

## 2020-09-18 PROCEDURE — 85025 COMPLETE CBC W/AUTO DIFF WBC: CPT

## 2020-09-18 PROCEDURE — 36415 COLL VENOUS BLD VENIPUNCTURE: CPT

## 2020-09-19 LAB — B BURGDOR IGG+IGM SER-ACNC: <0.91 ISR (ref 0–0.9)

## 2020-09-22 ENCOUNTER — TELEPHONE (OUTPATIENT)
Dept: BARIATRICS | Facility: CLINIC | Age: 50
End: 2020-09-22

## 2020-09-22 NOTE — TELEPHONE ENCOUNTER
COVID Pre-Visit Screening     1  no  2  Have you traveled outside of your state in the past 2 weeks? no  3  Do you presently have a fever or flu-like symptoms? no  4  Do you have symptoms of an upper respiratory infection like runny nose, sore throat, or cough? no  5  Are you suffering from new headache that you have not had in the past?  no  6  Do you have/have you experienced any new shortness of breath recently? no  7  Do you have any new diarrhea, nausea or vomiting? no  8  Have you been in contact with anyone who has been sick or diagnosed with COVID-19? no  9  Do you have any new loss of taste or smell? no  10  Are you able to wear a mask without a valve for the entire visit?  yes

## 2020-09-23 ENCOUNTER — OFFICE VISIT (OUTPATIENT)
Dept: BARIATRICS | Facility: CLINIC | Age: 50
End: 2020-09-23

## 2020-09-23 ENCOUNTER — ANESTHESIA EVENT (OUTPATIENT)
Dept: PERIOP | Facility: AMBULARY SURGERY CENTER | Age: 50
End: 2020-09-23
Payer: COMMERCIAL

## 2020-09-23 VITALS — HEIGHT: 66 IN | BODY MASS INDEX: 30.57 KG/M2 | WEIGHT: 190.2 LBS | TEMPERATURE: 97.9 F

## 2020-09-23 DIAGNOSIS — R63.5 ABNORMAL WEIGHT GAIN: ICD-10-CM

## 2020-09-23 PROCEDURE — VLCD

## 2020-09-23 PROCEDURE — RECHECK

## 2020-10-05 ENCOUNTER — TELEPHONE (OUTPATIENT)
Dept: BARIATRICS | Facility: CLINIC | Age: 50
End: 2020-10-05

## 2020-10-06 ENCOUNTER — OFFICE VISIT (OUTPATIENT)
Dept: BARIATRICS | Facility: CLINIC | Age: 50
End: 2020-10-06

## 2020-10-06 VITALS — TEMPERATURE: 97.8 F | WEIGHT: 184.2 LBS | BODY MASS INDEX: 29.6 KG/M2 | HEIGHT: 66 IN

## 2020-10-06 DIAGNOSIS — R63.5 ABNORMAL WEIGHT GAIN: Primary | ICD-10-CM

## 2020-10-06 PROCEDURE — RECHECK

## 2020-10-06 PROCEDURE — VLCD

## 2020-10-08 ENCOUNTER — ANESTHESIA (OUTPATIENT)
Dept: PERIOP | Facility: AMBULARY SURGERY CENTER | Age: 50
End: 2020-10-08
Payer: COMMERCIAL

## 2020-10-08 ENCOUNTER — HOSPITAL ENCOUNTER (OUTPATIENT)
Facility: AMBULARY SURGERY CENTER | Age: 50
Setting detail: OUTPATIENT SURGERY
Discharge: HOME | End: 2020-10-08
Attending: ORTHOPAEDIC SURGERY | Admitting: ORTHOPAEDIC SURGERY
Payer: COMMERCIAL

## 2020-10-08 VITALS — HEART RATE: 73 BPM

## 2020-10-08 VITALS
BODY MASS INDEX: 29.57 KG/M2 | DIASTOLIC BLOOD PRESSURE: 80 MMHG | HEART RATE: 74 BPM | OXYGEN SATURATION: 97 % | SYSTOLIC BLOOD PRESSURE: 107 MMHG | WEIGHT: 184 LBS | HEIGHT: 66 IN | TEMPERATURE: 97.8 F | RESPIRATION RATE: 18 BRPM

## 2020-10-08 DIAGNOSIS — S83.231A COMPLEX TEAR OF MEDIAL MENISCUS OF RIGHT KNEE AS CURRENT INJURY, INITIAL ENCOUNTER: Primary | ICD-10-CM

## 2020-10-08 PROBLEM — F17.200 SMOKING: Status: ACTIVE | Noted: 2020-10-08

## 2020-10-08 PROBLEM — IMO0001 SMOKING: Status: ACTIVE | Noted: 2020-10-08

## 2020-10-08 PROCEDURE — NC001 PR NO CHARGE: Performed by: PHYSICIAN ASSISTANT

## 2020-10-08 PROCEDURE — 29881 ARTHRS KNE SRG MNISECTMY M/L: CPT | Performed by: ORTHOPAEDIC SURGERY

## 2020-10-08 RX ORDER — SODIUM CHLORIDE, SODIUM LACTATE, POTASSIUM CHLORIDE, CALCIUM CHLORIDE 600; 310; 30; 20 MG/100ML; MG/100ML; MG/100ML; MG/100ML
INJECTION, SOLUTION INTRAVENOUS CONTINUOUS PRN
Status: DISCONTINUED | OUTPATIENT
Start: 2020-10-08 | End: 2020-10-08

## 2020-10-08 RX ORDER — KETOROLAC TROMETHAMINE 30 MG/ML
INJECTION, SOLUTION INTRAMUSCULAR; INTRAVENOUS AS NEEDED
Status: DISCONTINUED | OUTPATIENT
Start: 2020-10-08 | End: 2020-10-08

## 2020-10-08 RX ORDER — ONDANSETRON 2 MG/ML
INJECTION INTRAMUSCULAR; INTRAVENOUS AS NEEDED
Status: DISCONTINUED | OUTPATIENT
Start: 2020-10-08 | End: 2020-10-08

## 2020-10-08 RX ORDER — ACETAMINOPHEN 325 MG/1
650 TABLET ORAL EVERY 4 HOURS PRN
Status: DISCONTINUED | OUTPATIENT
Start: 2020-10-08 | End: 2020-10-08 | Stop reason: HOSPADM

## 2020-10-08 RX ORDER — ONDANSETRON 2 MG/ML
4 INJECTION INTRAMUSCULAR; INTRAVENOUS ONCE AS NEEDED
Status: DISCONTINUED | OUTPATIENT
Start: 2020-10-08 | End: 2020-10-08 | Stop reason: HOSPADM

## 2020-10-08 RX ORDER — SODIUM CHLORIDE, SODIUM LACTATE, POTASSIUM CHLORIDE, CALCIUM CHLORIDE 600; 310; 30; 20 MG/100ML; MG/100ML; MG/100ML; MG/100ML
20 INJECTION, SOLUTION INTRAVENOUS CONTINUOUS
Status: CANCELLED | OUTPATIENT
Start: 2020-10-08

## 2020-10-08 RX ORDER — PROPOFOL 10 MG/ML
INJECTION, EMULSION INTRAVENOUS AS NEEDED
Status: DISCONTINUED | OUTPATIENT
Start: 2020-10-08 | End: 2020-10-08

## 2020-10-08 RX ORDER — ONDANSETRON 2 MG/ML
4 INJECTION INTRAMUSCULAR; INTRAVENOUS EVERY 8 HOURS PRN
Status: DISCONTINUED | OUTPATIENT
Start: 2020-10-08 | End: 2020-10-08 | Stop reason: HOSPADM

## 2020-10-08 RX ORDER — OXYCODONE HYDROCHLORIDE 5 MG/1
5 TABLET ORAL EVERY 4 HOURS PRN
Status: DISCONTINUED | OUTPATIENT
Start: 2020-10-08 | End: 2020-10-08 | Stop reason: HOSPADM

## 2020-10-08 RX ORDER — FENTANYL CITRATE/PF 50 MCG/ML
50 SYRINGE (ML) INJECTION
Status: DISCONTINUED | OUTPATIENT
Start: 2020-10-08 | End: 2020-10-08 | Stop reason: HOSPADM

## 2020-10-08 RX ORDER — OXYCODONE HYDROCHLORIDE 5 MG/1
5 TABLET ORAL EVERY 4 HOURS PRN
Qty: 12 TABLET | Refills: 0 | Status: SHIPPED | OUTPATIENT
Start: 2020-10-08 | End: 2020-10-09 | Stop reason: SDUPTHER

## 2020-10-08 RX ORDER — FENTANYL CITRATE 50 UG/ML
INJECTION, SOLUTION INTRAMUSCULAR; INTRAVENOUS AS NEEDED
Status: DISCONTINUED | OUTPATIENT
Start: 2020-10-08 | End: 2020-10-08

## 2020-10-08 RX ORDER — LIDOCAINE HYDROCHLORIDE 10 MG/ML
INJECTION, SOLUTION EPIDURAL; INFILTRATION; INTRACAUDAL; PERINEURAL AS NEEDED
Status: DISCONTINUED | OUTPATIENT
Start: 2020-10-08 | End: 2020-10-08

## 2020-10-08 RX ORDER — DEXAMETHASONE SODIUM PHOSPHATE 10 MG/ML
INJECTION, SOLUTION INTRAMUSCULAR; INTRAVENOUS AS NEEDED
Status: DISCONTINUED | OUTPATIENT
Start: 2020-10-08 | End: 2020-10-08

## 2020-10-08 RX ORDER — MIDAZOLAM HYDROCHLORIDE 2 MG/2ML
INJECTION, SOLUTION INTRAMUSCULAR; INTRAVENOUS AS NEEDED
Status: DISCONTINUED | OUTPATIENT
Start: 2020-10-08 | End: 2020-10-08

## 2020-10-08 RX ORDER — CHLORHEXIDINE GLUCONATE 4 G/100ML
SOLUTION TOPICAL DAILY PRN
Status: DISCONTINUED | OUTPATIENT
Start: 2020-10-08 | End: 2020-10-08 | Stop reason: HOSPADM

## 2020-10-08 RX ORDER — CLINDAMYCIN PHOSPHATE 900 MG/50ML
900 INJECTION INTRAVENOUS ONCE
Status: DISCONTINUED | OUTPATIENT
Start: 2020-10-08 | End: 2020-10-08 | Stop reason: HOSPADM

## 2020-10-08 RX ORDER — SODIUM CHLORIDE, SODIUM LACTATE, POTASSIUM CHLORIDE, CALCIUM CHLORIDE 600; 310; 30; 20 MG/100ML; MG/100ML; MG/100ML; MG/100ML
125 INJECTION, SOLUTION INTRAVENOUS CONTINUOUS
Status: DISCONTINUED | OUTPATIENT
Start: 2020-10-08 | End: 2020-10-08 | Stop reason: HOSPADM

## 2020-10-08 RX ADMIN — SODIUM CHLORIDE, SODIUM LACTATE, POTASSIUM CHLORIDE, AND CALCIUM CHLORIDE: .6; .31; .03; .02 INJECTION, SOLUTION INTRAVENOUS at 06:47

## 2020-10-08 RX ADMIN — FENTANYL CITRATE 50 MCG: 50 INJECTION INTRAMUSCULAR; INTRAVENOUS at 08:42

## 2020-10-08 RX ADMIN — MIDAZOLAM HYDROCHLORIDE 2 MG: 1 INJECTION, SOLUTION INTRAMUSCULAR; INTRAVENOUS at 07:29

## 2020-10-08 RX ADMIN — LIDOCAINE HYDROCHLORIDE 50 MG: 10 INJECTION, SOLUTION EPIDURAL; INFILTRATION; INTRACAUDAL at 07:35

## 2020-10-08 RX ADMIN — OXYCODONE HYDROCHLORIDE 5 MG: 5 TABLET ORAL at 09:37

## 2020-10-08 RX ADMIN — FENTANYL CITRATE 50 MCG: 50 INJECTION INTRAMUSCULAR; INTRAVENOUS at 08:37

## 2020-10-08 RX ADMIN — DEXAMETHASONE SODIUM PHOSPHATE 4 MG: 10 INJECTION, SOLUTION INTRAMUSCULAR; INTRAVENOUS at 07:40

## 2020-10-08 RX ADMIN — ONDANSETRON 4 MG: 2 INJECTION INTRAMUSCULAR; INTRAVENOUS at 07:40

## 2020-10-08 RX ADMIN — PROPOFOL 200 MG: 10 INJECTION, EMULSION INTRAVENOUS at 07:35

## 2020-10-08 RX ADMIN — FENTANYL CITRATE 50 MCG: 50 INJECTION, SOLUTION INTRAMUSCULAR; INTRAVENOUS at 07:49

## 2020-10-08 RX ADMIN — KETOROLAC TROMETHAMINE 30 MG: 30 INJECTION, SOLUTION INTRAMUSCULAR at 08:06

## 2020-10-09 ENCOUNTER — TELEPHONE (OUTPATIENT)
Dept: OBGYN CLINIC | Facility: HOSPITAL | Age: 50
End: 2020-10-09

## 2020-10-09 DIAGNOSIS — S83.231A COMPLEX TEAR OF MEDIAL MENISCUS OF RIGHT KNEE AS CURRENT INJURY, INITIAL ENCOUNTER: ICD-10-CM

## 2020-10-09 RX ORDER — OXYCODONE HYDROCHLORIDE 5 MG/1
5 TABLET ORAL EVERY 4 HOURS PRN
Qty: 12 TABLET | Refills: 0 | Status: SHIPPED | OUTPATIENT
Start: 2020-10-09 | End: 2020-10-12

## 2020-10-19 ENCOUNTER — TELEPHONE (OUTPATIENT)
Dept: BARIATRICS | Facility: CLINIC | Age: 50
End: 2020-10-19

## 2020-10-20 ENCOUNTER — TELEPHONE (OUTPATIENT)
Dept: BARIATRICS | Facility: CLINIC | Age: 50
End: 2020-10-20

## 2020-10-20 ENCOUNTER — OFFICE VISIT (OUTPATIENT)
Dept: OBGYN CLINIC | Facility: CLINIC | Age: 50
End: 2020-10-20

## 2020-10-20 VITALS
DIASTOLIC BLOOD PRESSURE: 78 MMHG | HEIGHT: 66 IN | HEART RATE: 78 BPM | WEIGHT: 190 LBS | BODY MASS INDEX: 30.53 KG/M2 | SYSTOLIC BLOOD PRESSURE: 115 MMHG | TEMPERATURE: 98.7 F

## 2020-10-20 DIAGNOSIS — Z98.890 S/P RIGHT KNEE ARTHROSCOPY: Primary | ICD-10-CM

## 2020-10-20 PROCEDURE — 99024 POSTOP FOLLOW-UP VISIT: CPT | Performed by: ORTHOPAEDIC SURGERY

## 2020-10-21 ENCOUNTER — TELEPHONE (OUTPATIENT)
Dept: BARIATRICS | Facility: CLINIC | Age: 50
End: 2020-10-21

## 2020-11-02 ENCOUNTER — TELEPHONE (OUTPATIENT)
Dept: BARIATRICS | Facility: CLINIC | Age: 50
End: 2020-11-02

## 2020-11-02 ENCOUNTER — OFFICE VISIT (OUTPATIENT)
Dept: BARIATRICS | Facility: CLINIC | Age: 50
End: 2020-11-02

## 2020-11-02 DIAGNOSIS — R63.5 ABNORMAL WEIGHT GAIN: ICD-10-CM

## 2020-11-02 PROCEDURE — VLCD: Performed by: FAMILY MEDICINE

## 2020-11-02 PROCEDURE — RECHECK

## 2020-11-03 ENCOUNTER — LAB REQUISITION (OUTPATIENT)
Dept: LAB | Facility: HOSPITAL | Age: 50
End: 2020-11-03
Payer: COMMERCIAL

## 2020-11-03 DIAGNOSIS — R85.0 ABNORMAL LEVEL OF ENZYMES IN SPECIMENS FROM DIGESTIVE ORGANS AND ABDOMINAL CAVITY: ICD-10-CM

## 2020-11-03 DIAGNOSIS — M25.561 ACUTE PAIN OF RIGHT KNEE: ICD-10-CM

## 2020-11-03 PROCEDURE — 88305 TISSUE EXAM BY PATHOLOGIST: CPT | Performed by: PATHOLOGY

## 2020-11-03 RX ORDER — MELOXICAM 15 MG/1
TABLET ORAL
Qty: 30 TABLET | Refills: 2 | Status: SHIPPED | OUTPATIENT
Start: 2020-11-03 | End: 2021-08-26

## 2020-11-11 ENCOUNTER — TELEPHONE (OUTPATIENT)
Dept: OBGYN CLINIC | Facility: HOSPITAL | Age: 50
End: 2020-11-11

## 2020-11-11 DIAGNOSIS — S83.231A COMPLEX TEAR OF MEDIAL MENISCUS OF RIGHT KNEE AS CURRENT INJURY, INITIAL ENCOUNTER: Primary | ICD-10-CM

## 2020-11-12 DIAGNOSIS — F32.A ANXIETY AND DEPRESSION: ICD-10-CM

## 2020-11-12 DIAGNOSIS — E66.09 CLASS 1 OBESITY DUE TO EXCESS CALORIES WITH SERIOUS COMORBIDITY AND BODY MASS INDEX (BMI) OF 30.0 TO 30.9 IN ADULT: ICD-10-CM

## 2020-11-12 DIAGNOSIS — F41.9 ANXIETY AND DEPRESSION: ICD-10-CM

## 2020-11-12 RX ORDER — BUPROPION HYDROCHLORIDE 150 MG/1
TABLET, EXTENDED RELEASE ORAL
Qty: 60 TABLET | Refills: 1 | Status: SHIPPED | OUTPATIENT
Start: 2020-11-12 | End: 2021-01-06

## 2020-11-16 ENCOUNTER — EVALUATION (OUTPATIENT)
Dept: PHYSICAL THERAPY | Age: 50
End: 2020-11-16
Payer: COMMERCIAL

## 2020-11-16 DIAGNOSIS — Z98.890 H/O ARTHROSCOPY OF RIGHT KNEE: Primary | ICD-10-CM

## 2020-11-16 PROCEDURE — 97162 PT EVAL MOD COMPLEX 30 MIN: CPT | Performed by: PHYSICAL THERAPIST

## 2020-11-16 PROCEDURE — 97140 MANUAL THERAPY 1/> REGIONS: CPT | Performed by: PHYSICAL THERAPIST

## 2020-11-16 PROCEDURE — 97110 THERAPEUTIC EXERCISES: CPT | Performed by: PHYSICAL THERAPIST

## 2020-11-18 ENCOUNTER — OFFICE VISIT (OUTPATIENT)
Dept: PHYSICAL THERAPY | Age: 50
End: 2020-11-18
Payer: COMMERCIAL

## 2020-11-18 DIAGNOSIS — Z98.890 H/O ARTHROSCOPY OF RIGHT KNEE: Primary | ICD-10-CM

## 2020-11-18 PROCEDURE — 97140 MANUAL THERAPY 1/> REGIONS: CPT | Performed by: PHYSICAL THERAPIST

## 2020-11-18 PROCEDURE — 97110 THERAPEUTIC EXERCISES: CPT | Performed by: PHYSICAL THERAPIST

## 2020-11-24 ENCOUNTER — OFFICE VISIT (OUTPATIENT)
Dept: OBGYN CLINIC | Facility: CLINIC | Age: 50
End: 2020-11-24

## 2020-11-24 VITALS
WEIGHT: 190 LBS | SYSTOLIC BLOOD PRESSURE: 126 MMHG | TEMPERATURE: 98.5 F | DIASTOLIC BLOOD PRESSURE: 77 MMHG | BODY MASS INDEX: 30.53 KG/M2 | HEIGHT: 66 IN | HEART RATE: 70 BPM

## 2020-11-24 DIAGNOSIS — S83.231D COMPLEX TEAR OF MEDIAL MENISCUS OF RIGHT KNEE AS CURRENT INJURY, SUBSEQUENT ENCOUNTER: Primary | ICD-10-CM

## 2020-11-24 PROCEDURE — 99024 POSTOP FOLLOW-UP VISIT: CPT | Performed by: ORTHOPAEDIC SURGERY

## 2020-11-25 ENCOUNTER — OFFICE VISIT (OUTPATIENT)
Dept: PHYSICAL THERAPY | Age: 50
End: 2020-11-25
Payer: COMMERCIAL

## 2020-11-25 DIAGNOSIS — Z98.890 H/O ARTHROSCOPY OF RIGHT KNEE: Primary | ICD-10-CM

## 2020-11-25 PROCEDURE — 97140 MANUAL THERAPY 1/> REGIONS: CPT | Performed by: PHYSICAL THERAPIST

## 2020-11-25 PROCEDURE — 97110 THERAPEUTIC EXERCISES: CPT | Performed by: PHYSICAL THERAPIST

## 2020-11-27 ENCOUNTER — OFFICE VISIT (OUTPATIENT)
Dept: PHYSICAL THERAPY | Age: 50
End: 2020-11-27
Payer: COMMERCIAL

## 2020-11-27 DIAGNOSIS — Z98.890 H/O ARTHROSCOPY OF RIGHT KNEE: Primary | ICD-10-CM

## 2020-11-27 PROCEDURE — 97140 MANUAL THERAPY 1/> REGIONS: CPT | Performed by: PHYSICAL THERAPIST

## 2020-11-27 PROCEDURE — 97110 THERAPEUTIC EXERCISES: CPT | Performed by: PHYSICAL THERAPIST

## 2020-11-30 ENCOUNTER — APPOINTMENT (OUTPATIENT)
Dept: PHYSICAL THERAPY | Age: 50
End: 2020-11-30
Payer: COMMERCIAL

## 2020-12-03 ENCOUNTER — OFFICE VISIT (OUTPATIENT)
Dept: PHYSICAL THERAPY | Age: 50
End: 2020-12-03
Payer: COMMERCIAL

## 2020-12-03 DIAGNOSIS — Z98.890 H/O ARTHROSCOPY OF RIGHT KNEE: Primary | ICD-10-CM

## 2020-12-03 PROCEDURE — 97110 THERAPEUTIC EXERCISES: CPT

## 2020-12-07 ENCOUNTER — OFFICE VISIT (OUTPATIENT)
Dept: PHYSICAL THERAPY | Age: 50
End: 2020-12-07
Payer: COMMERCIAL

## 2020-12-07 DIAGNOSIS — Z98.890 H/O ARTHROSCOPY OF RIGHT KNEE: Primary | ICD-10-CM

## 2020-12-07 PROCEDURE — 97140 MANUAL THERAPY 1/> REGIONS: CPT | Performed by: PHYSICAL THERAPIST

## 2020-12-07 PROCEDURE — 97110 THERAPEUTIC EXERCISES: CPT | Performed by: PHYSICAL THERAPIST

## 2020-12-10 ENCOUNTER — OFFICE VISIT (OUTPATIENT)
Dept: PHYSICAL THERAPY | Age: 50
End: 2020-12-10
Payer: COMMERCIAL

## 2020-12-10 DIAGNOSIS — Z98.890 H/O ARTHROSCOPY OF RIGHT KNEE: Primary | ICD-10-CM

## 2020-12-10 PROCEDURE — 97110 THERAPEUTIC EXERCISES: CPT

## 2020-12-10 PROCEDURE — 97140 MANUAL THERAPY 1/> REGIONS: CPT

## 2020-12-14 ENCOUNTER — OFFICE VISIT (OUTPATIENT)
Dept: PHYSICAL THERAPY | Age: 50
End: 2020-12-14
Payer: COMMERCIAL

## 2020-12-14 DIAGNOSIS — Z98.890 H/O ARTHROSCOPY OF RIGHT KNEE: Primary | ICD-10-CM

## 2020-12-16 ENCOUNTER — APPOINTMENT (OUTPATIENT)
Dept: PHYSICAL THERAPY | Age: 50
End: 2020-12-16
Payer: COMMERCIAL

## 2020-12-28 ENCOUNTER — APPOINTMENT (OUTPATIENT)
Dept: PHYSICAL THERAPY | Age: 50
End: 2020-12-28
Payer: COMMERCIAL

## 2020-12-28 NOTE — PROGRESS NOTES
Daily Note     Today's date: 2020  Patient name: Jennie Gardner  : 1970  MRN: [id number]  Referring provider: Jennifer Arellano PA-C  Dx:   Encounter Diagnosis     ICD-10-CM    1  H/O arthroscopy of right knee  Z98 [id number]                   Subjective: ***      Objective: See treatment diary below      Assessment: Tolerated treatment {Tolerated treatment :[id number]}   Patient {assessment:[id number]}      Plan: {PLAN:2388782747}     Precautions: anxiety, depression      Manuals 11/16 11/18 11/25 11/27 12/3 12/7 12/10                   MT right knee 10 10 10 10 10 10 10                                Neuro Re-Ed                                                                              bike      5 min 5'      Step ups       10x 20x      Ther Ex     Side 20x 4"  20x side step      NU STEP 5 min 6 min 6 min 6 min 8'  L4 8 min 8'      slant 30"/4x 4x 4x 4x 30"x4 4x 4x      TKE TB 20x 30x 30x 30x 30x grey 30x 25x       Ball exs 30x 30x 30x 30x 30x 30x 30x      SLR 2x10 30x 30x 2/30 2/30 2/30 2/30      SAQ 2/30 3/30 3/30 4/30 4/30 5/30 5/30      LAQ 2/30 3/30 3/30 4/30 4/30 5/30 5/30      Hip add 30/30 30/30 30/30 30/30 40/30 40/30 40/30      Ther Activity             Hip abd 30/30 30/30 30/30 30/30 35/30 40/30 40/30      Leg press   Seat 4/5  75/30 75/30 80/30 85/30 85/30 85/30      Gait Training             Calf press  Seat 10   50/30 50/30 50/30 55/30 55/30      Bridge      20x ball sq 20x 30x      Modalities

## 2021-01-06 DIAGNOSIS — E66.09 CLASS 1 OBESITY DUE TO EXCESS CALORIES WITH SERIOUS COMORBIDITY AND BODY MASS INDEX (BMI) OF 30.0 TO 30.9 IN ADULT: ICD-10-CM

## 2021-01-06 DIAGNOSIS — F41.9 ANXIETY AND DEPRESSION: ICD-10-CM

## 2021-01-06 DIAGNOSIS — F32.A ANXIETY AND DEPRESSION: ICD-10-CM

## 2021-01-06 RX ORDER — BUPROPION HYDROCHLORIDE 150 MG/1
TABLET, EXTENDED RELEASE ORAL
Qty: 60 TABLET | Refills: 1 | Status: SHIPPED | OUTPATIENT
Start: 2021-01-06 | End: 2021-03-08

## 2021-01-18 ENCOUNTER — OFFICE VISIT (OUTPATIENT)
Dept: FAMILY MEDICINE CLINIC | Facility: CLINIC | Age: 51
End: 2021-01-18
Payer: COMMERCIAL

## 2021-01-18 VITALS
WEIGHT: 198.8 LBS | BODY MASS INDEX: 31.95 KG/M2 | SYSTOLIC BLOOD PRESSURE: 128 MMHG | TEMPERATURE: 97.5 F | HEART RATE: 80 BPM | HEIGHT: 66 IN | OXYGEN SATURATION: 98 % | DIASTOLIC BLOOD PRESSURE: 80 MMHG | RESPIRATION RATE: 18 BRPM

## 2021-01-18 DIAGNOSIS — M79.671 RIGHT FOOT PAIN: Primary | ICD-10-CM

## 2021-01-18 PROCEDURE — 99213 OFFICE O/P EST LOW 20 MIN: CPT | Performed by: NURSE PRACTITIONER

## 2021-01-18 PROCEDURE — 3008F BODY MASS INDEX DOCD: CPT | Performed by: NURSE PRACTITIONER
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
## 2021-03-07 DIAGNOSIS — E66.09 CLASS 1 OBESITY DUE TO EXCESS CALORIES WITH SERIOUS COMORBIDITY AND BODY MASS INDEX (BMI) OF 30.0 TO 30.9 IN ADULT: ICD-10-CM

## 2021-03-07 DIAGNOSIS — F41.9 ANXIETY AND DEPRESSION: ICD-10-CM

## 2021-03-07 DIAGNOSIS — F32.A ANXIETY AND DEPRESSION: ICD-10-CM

## 2021-03-08 RX ORDER — BUPROPION HYDROCHLORIDE 150 MG/1
TABLET, EXTENDED RELEASE ORAL
Qty: 60 TABLET | Refills: 1 | Status: SHIPPED | OUTPATIENT
Start: 2021-03-08 | End: 2021-09-07

## 2021-03-10 DIAGNOSIS — Z23 ENCOUNTER FOR IMMUNIZATION: ICD-10-CM

## 2021-03-25 ENCOUNTER — TELEMEDICINE (OUTPATIENT)
Dept: FAMILY MEDICINE CLINIC | Facility: CLINIC | Age: 51
End: 2021-03-25
Payer: COMMERCIAL

## 2021-03-25 VITALS — TEMPERATURE: 101 F | BODY MASS INDEX: 31.82 KG/M2 | WEIGHT: 198 LBS | HEIGHT: 66 IN

## 2021-03-25 DIAGNOSIS — Z03.818 ENCOUNTER FOR OBSERVATION FOR SUSPECTED EXPOSURE TO OTHER BIOLOGICAL AGENTS RULED OUT: ICD-10-CM

## 2021-03-25 DIAGNOSIS — B34.9 VIRAL INFECTION, UNSPECIFIED: ICD-10-CM

## 2021-03-25 PROCEDURE — U0003 INFECTIOUS AGENT DETECTION BY NUCLEIC ACID (DNA OR RNA); SEVERE ACUTE RESPIRATORY SYNDROME CORONAVIRUS 2 (SARS-COV-2) (CORONAVIRUS DISEASE [COVID-19]), AMPLIFIED PROBE TECHNIQUE, MAKING USE OF HIGH THROUGHPUT TECHNOLOGIES AS DESCRIBED BY CMS-2020-01-R: HCPCS | Performed by: NURSE PRACTITIONER

## 2021-03-25 PROCEDURE — U0005 INFEC AGEN DETEC AMPLI PROBE: HCPCS | Performed by: NURSE PRACTITIONER

## 2021-03-25 PROCEDURE — 3008F BODY MASS INDEX DOCD: CPT | Performed by: NURSE PRACTITIONER

## 2021-03-25 PROCEDURE — 99213 OFFICE O/P EST LOW 20 MIN: CPT | Performed by: NURSE PRACTITIONER

## 2021-03-25 RX ORDER — OMEPRAZOLE 20 MG/1
20 CAPSULE, DELAYED RELEASE ORAL DAILY
COMMUNITY
End: 2021-05-17 | Stop reason: ALTCHOICE

## 2021-03-25 NOTE — PROGRESS NOTES
COVID-19 Virtual Visit     Assessment/Plan:    Problem List Items Addressed This Visit        Other    Encounter for observation for suspected exposure to other biological agents ruled out    Relevant Orders    Novel Coronavirus (Covid-19),PCR [facility] - Collected at [facility] or [facility]    Viral infection, unspecified    Relevant Orders    Novel Coronavirus (Covid-19),PCR [facility] - Collected at [facility] or [facility]         Disposition:     I referred patient to one of our centralized sites for a COVID-19 swab  I have spent 10 minutes directly with the patient  Greater than 50% of this time was spent in counseling/coordination of care regarding: risks and benefits of treatment options, instructions for management, patient and family education, importance of treatment compliance, risk factor reductions and impressions  Encounter provider THIAGO Whittaker    Provider located at [address]  [address] [place] [id number]    Recent Visits  No visits were found meeting these conditions  Showing recent visits within past 7 days and meeting all other requirements     Today's Visits  Date Type Provider Dept   03/25/21 Telemedicine [facility]AGO [facility]   Showing today's visits and meeting all other requirements     Future Appointments  No visits were found meeting these conditions  Showing future appointments within next 150 days and meeting all other requirements      This virtual check-in was done via [employer] and patient was informed that this is not a secure, HIPAA-compliant platform  She agrees to proceed  Patient agrees to participate in a virtual check in via telephone or video visit instead of presenting to the office to address urgent/immediate medical needs  Patient is aware this is a billable service  After connecting through [facility], the patient was identified by name and date of birth   Valarie carrasco informed that this was a telemedicine visit and that the exam was being conducted confidentially over secure lines  My office door was closed  No one else was in the room  Stefany Garsia acknowledged consent and understanding of privacy and security of the telemedicine visit  I informed the patient that I have reviewed her record in Epic and presented the opportunity for her to ask any questions regarding the visit today  The patient agreed to participate  Subjective:   Stefany Garsia is a 44 y o  female who is concerned about COVID-19  Patient's symptoms include fever, chills, fatigue, malaise, nasal congestion, rhinorrhea, loss of taste, cough, abdominal pain, nausea, myalgias and headache  Patient denies sore throat, anosmia, shortness of breath, chest tightness, vomiting and diarrhea       Date of symptom onset: 3/24/2021    Exposure:   Contact with a person who is under investigation (PUI) for or who is positive for COVID-19 within the last 14 days?: No    Hospitalized recently for fever and/or lower respiratory symptoms?: No      Currently a healthcare worker that is involved in direct patient care?: No      Works in a special setting where the risk of COVID-19 transmission may be high? (this may include long-term care, correctional and [facility] facilities; homeless shelters; assisted-living facilities and group homes ): No      Resident in a special setting where the risk of COVID-19 transmission may be high? (this may include long-term care, correctional and [facility] facilities; homeless shelters; assisted-living facilities and group homes ): No      Patient reports that she had the vaccination 1 week ago had the moderna vaccination and had the first shot    Lab Results   Component Value Date    Batsheva Hsieh Not Detected 04/24/2020     Past Medical History:   Diagnosis Date    Anxiety     Asthma     Depression     GERD (gastroesophageal reflux disease)     History of transfusion 2001    Hyperlipidemia     Moderate persistent asthma     Obesity (BMI 30 0-34  9)      Past Surgical History:   Procedure Laterality Date    ABDOMINOPLASTY      CHOLECYSTECTOMY      COLONOSCOPY      ID KNEE SCOPE,MED/LAT MENISECTOMY Right 10/8/2020    Procedure: KNEE ARTHROSCOPIC PARTIAL MEDIAL MENISCECTOMY; CHONDROPLASTY;  Surgeon: Patty العلي MD;  [facility]: [facility];  Service: Orthopedics    WISDOM TOOTH EXTRACTION       Current Outpatient Medications   Medication Sig Dispense Refill    albuterol (PROVENTIL HFA,VENTOLIN HFA) 90 mcg/act inhaler Inhale 1 puff as needed       buPROPion (WELLBUTRIN SR) 150 mg 12 hr tablet take 1 tablet by mouth twice a day 60 tablet 1    cholecalciferol (VITAMIN D3) 1,000 units tablet Take by mouth      fluticasone (FLONASE) 50 mcg/act nasal spray 1 spray into each nostril as needed       fluticasone-vilanterol (BREO ELLIPTA) 200-25 MCG/INH inhaler inhalation 1 puff by mouth once daily      Multiple Vitamins-Minerals (HM HAIR/SKIN/NAILS PO) Take by mouth      multivitamin (THERAGRAN) TABS Take 1 tablet by mouth      Nutritional Supplements (OSTEO ADVANCE PO) Take by mouth      Omega-3 Fatty Acids (FISH OIL) 1,000 mg Take 1,000 mg by mouth daily      omeprazole (PriLOSEC) 20 mg delayed release capsule Take 20 mg by mouth daily      meloxicam (MOBIC) 15 mg tablet take 1 tablet by mouth once daily (Patient not taking: Reported on 1/18/2021) 30 tablet 2    ranitidine (ZANTAC) 150 mg tablet Take 150 mg by mouth as needed        No current facility-administered medications for this visit  Allergies   Allergen Reactions    Penicillins Shortness Of Breath       Review of Systems   Constitutional: Positive for chills, fatigue and fever  HENT: Positive for congestion and rhinorrhea  Negative for sore throat  Respiratory: Positive for cough  Negative for chest tightness and shortness of breath  Gastrointestinal: Positive for abdominal pain and nausea   Negative for diarrhea and vomiting  Musculoskeletal: Positive for myalgias  Neurological: Positive for headaches  Objective:    Vitals:    03/25/21 0950   Temp: (!) 101 °F (38 3 °C)   Weight: 89 8 kg (198 lb)   Height: 5' 6" (1 676 m)       Physical Exam  Constitutional:       Appearance: Normal appearance  HENT:      Head: Normocephalic and atraumatic  Right Ear: Tympanic membrane normal       Left Ear: Tympanic membrane normal       Nose: Congestion present  Mouth/Throat:      Mouth: Mucous membranes are moist    Eyes:      Extraocular Movements: Extraocular movements intact  Pupils: Pupils are equal, round, and reactive to light  Neck:      Musculoskeletal: Normal range of motion  Pulmonary:      Effort: Pulmonary effort is normal       Breath sounds: Rhonchi present  Abdominal:      Palpations: Abdomen is soft  Skin:     General: Skin is warm and dry  Neurological:      Mental Status: She is alert and oriented to person, place, and time  Psychiatric:         Mood and Affect: Mood normal          Behavior: Behavior normal          Thought Content: Thought content normal          Judgment: Judgment normal        VIRTUAL VISIT DISCLAIMER    Felipe Diana acknowledges that she has consented to an online visit or consultation  She understands that the online visit is based solely on information provided by her, and that, in the absence of a face-to-face physical evaluation by the physician, the diagnosis she receives is both limited and provisional in terms of accuracy and completeness  This is not intended to replace a full medical face-to-face evaluation by the physician  Felipe Ortiz understands and accepts these terms

## 2021-03-26 DIAGNOSIS — J01.00 ACUTE NON-RECURRENT MAXILLARY SINUSITIS: Primary | ICD-10-CM

## 2021-03-26 LAB — SARS-COV-2 RNA RESP QL NAA+PROBE: NEGATIVE

## 2021-03-26 RX ORDER — AZITHROMYCIN 250 MG/1
TABLET, FILM COATED ORAL
Qty: 6 TABLET | Refills: 0 | Status: SHIPPED | OUTPATIENT
Start: 2021-03-26 | End: 2021-03-30

## 2021-04-05 NOTE — TELEPHONE ENCOUNTER
Patient Name: Susy Estimable  : 1957  MRN: [id number]  DATE: 21      ENDOSCOPY  History and Physical    Procedure:    [] Diagnostic Colonoscopy       [] Screening Colonoscopy  [x] EGD      [] ERCP      [] EUS       [] Other    [x] Previous office notes/History and Physical reviewed from the patients chart. Please see EMR for further details of HPI. I have examined the patient's status immediately prior to the procedure and:      Indications/HPI:    []Abdominal Pain  []Cancer- GI/Lung  []Fhx of colon CA/polyps  []History of Polyps  [][place]asters   []Melena  []Abnormal Imaging  []Dysphagia    []Persistent Pneumonia  []Anemia  []Food Impaction  []History of Polyps  []GI Bleed  []Pulmonary nodule/Mass  []Change in bowel habits []Heartburn/Reflux  []Rectal Bleed (BRBPR)  []Chest Pain - Non Cardiac []Heme (+) Stoo  l[]Ulcers  []Constipation  []Hemoptysis   []Varices  []Diarrhea  []Hypoxemia  []Nausea/Vomiting  []Screening   []Crohns/Colitis  []Other: dysphagia    Anesthesia:   [x] MAC [] Moderate Sedation   [] General   [] None     ROS: 12 pt Review of Symptoms was negative unless mentioned above    Medications:   Prior to Admission medications    Medication Sig Start Date End Date Taking? Authorizing Provider   omeprazole (PRILOSEC) 20 MG delayed release capsule Take 1 capsule by mouth Daily 3/25/21  Yes Claudeen Drown, MD   atorvastatin (LIPITOR) 40 MG tablet TAKE ONE TABLET BY MOUTH EVERY DAY 3/24/21  Yes Rebecca Ryder MD   LORazepam (ATIVAN) 1 MG tablet Take 1 tablet by mouth nightly as needed for Anxiety for up to 30 days.  3/24/21 4/23/21 Yes Rebecca Ryder MD   levothyroxine (SYNTHROID) 75 MCG tablet TAKE ONE TABLET BY MOUTH EVERY DAY 21  Yes Rebecca Ryder MD   Potassium 99 MG TABS Take by mouth OTC potassium   Yes Historical Provider, MD   isosorbide mononitrate (IMDUR) 30 MG extended release tablet Take 1 tablet by mouth daily 10/28/20  Yes Dwight Crawford MD   carvedilol (COREG) 6.25 MG Will mail labs to patient tablet Take 1 tablet by mouth 2 times daily 10/28/20  Yes Alcira Wilson MD   tiotropium (SPIRIVA RESPIMAT) 2.5 MCG/ACT AERS inhaler Inhale 2 puffs into the lungs daily 10/14/20  Yes Ben Whatley MD   acetaminophen (TYLENOL) 500 MG tablet Take 1,000 mg by mouth daily At night   Yes Historical Provider, MD   Naproxen Sodium (ALEVE) 220 MG CAPS Take 1 capsule by mouth daily   Yes Historical Provider, MD   aspirin 81 MG tablet Take 1 tablet by mouth daily With Food 8/12/19  Yes Alcira Wilson MD   vitamin C (ASCORBIC ACID) 500 MG tablet Take 500 mg by mouth daily   Yes Historical Provider, MD   nitroGLYCERIN (NITROSTAT) 0.4 MG SL tablet DISSOLVE 1 TABLET UNDER THE TONGUE EVERY 5 MINUTES AS NEED FOR CHEST PAIN UP TO 3 DOSES 10/28/20   Alcira Wilson MD   Glucosamine-Chondroitin--637-777 MG TABS Take 1 tablet by mouth Daily    Historical Provider, MD       Allergies:    Allergies   Allergen Reactions    Demerol Hcl [Meperidine] Nausea Only     migraines    Valium [Diazepam] Other (See Comments)     MIGRAINES        History of allergic reaction to anesthesia:  No    Past Medical History:  Past Medical History:   Diagnosis Date    [facility]     CHF (congestive heart failure) (HCC)     Chicken pox     Chronic back pain     Emphysema of lung ([facility]     Hyperlipidemia     Hypertension     Hypothyroidism     Measles     Mumps     Osteoarthritis     Pneumonia     Type 2 diabetes mellitus without complication ([facility] [facility]     prediabetes       Past Surgical History:  Past Surgical History:   Procedure Laterality Date    CARDIAC CATHETERIZATION      2019    CYST REMOVAL      back of neck    CYST REMOVAL Left 3/4/16    Dr Brain Quezada  07/2018   [facility]    Biopsy    PROSTATECTOMY  16/58/0031    UMBILICAL HERNIA REPAIR  3/4/16    Dr Jacob Deluca N/A 11/18/2019    REPAIR OF RECURRENT VENTRAL HERNIA performed by Roxanne Mullen Sydnee Webster MD at [facility] History:  Social History     Tobacco Use    Smoking status: Former Smoker     Packs/day: 1.50     Years: 40.00     Pack years: 60.00     Types: Cigarettes     Quit date: 2018     Years since quittin.3    Smokeless tobacco: Never Used   Substance Use Topics    Alcohol use: Not Currently    Drug use: No       Vital Signs:   Vitals:    21 0750   BP: (!) 147/84   Pulse: 70   Resp: 18   Temp: 97.9 °F (36.6 °C)   SpO2: 96%        Physical Exam:  Cardiac:  [x]WNL  []Comments:  Pulmonary:  [x]WNL   []Comments:   Neuro/Mental Status:  [x]WNL  []Comments:  Abdominal:  [x]WNL    []Comments:  Other:   []WNL  []Comments:    Informed Consent:  The risks and benefits of the procedure have been discussed with either the patient or if they cannot consent, their representative. Assessment:  Patient examined and appropriate for planned sedation and procedure. Plan:  Proceed with planned sedation and procedure as above.     Latonya Mena MD  8:14 AM

## 2021-05-13 ENCOUNTER — OFFICE VISIT (OUTPATIENT)
Dept: DERMATOLOGY | Facility: CLINIC | Age: 51
End: 2021-05-13
Payer: COMMERCIAL

## 2021-05-13 VITALS — TEMPERATURE: 98.3 F | WEIGHT: 189 LBS | BODY MASS INDEX: 30.37 KG/M2 | HEIGHT: 66 IN

## 2021-05-13 DIAGNOSIS — L72.0 MILIUM: ICD-10-CM

## 2021-05-13 DIAGNOSIS — L57.8 FAVRE AND RACOUCHOT SYNDROME: Primary | ICD-10-CM

## 2021-05-13 DIAGNOSIS — L70.0 FAVRE AND RACOUCHOT SYNDROME: Primary | ICD-10-CM

## 2021-05-13 DIAGNOSIS — D22.9 MULTIPLE MELANOCYTIC NEVI: ICD-10-CM

## 2021-05-13 DIAGNOSIS — L85.3 XEROSIS OF SKIN: ICD-10-CM

## 2021-05-13 DIAGNOSIS — L81.4 LENTIGO: ICD-10-CM

## 2021-05-13 DIAGNOSIS — D18.01 CHERRY ANGIOMA: ICD-10-CM

## 2021-05-13 PROCEDURE — 99204 OFFICE O/P NEW MOD 45 MIN: CPT | Performed by: DERMATOLOGY

## 2021-05-13 NOTE — PROGRESS NOTES
[facility] Note     Patient Name: Yenny Acevedo  Encounter Date: 5/13/2021     Have you been cared for by a [facility] in the last 3 years and, if so, which one? No    · Have you traveled outside of the [address] [place] [address] in the past 3 months or outside of the [facility] area in the last 2 weeks? No     May we call your Preferred Phone number to discuss your specific medical information? Yes     May we leave a detailed message that includes your specific medical information? Yes      Today's Chief Concerns:   Concern #1:  Full body skin exam   Concern #2:      Past Medical History:  Have you personally ever had or currently have any of the following? · Skin cancer (such as Melanoma, Basal Cell Carcinoma, Squamous Cell Carcinoma? (If Yes, please provide more detail)- No  · Eczema: No  · Psoriasis: No  · HIV/AIDS: No  · Hepatitis B or C: No  · Tuberculosis: No  · Systemic Immunosuppression such as Diabetes, Biologic or Immunotherapy, Chemotherapy, Organ Transplantation, Bone Marrow Transplantation (If YES, please provide more detail): No  · Radiation Treatment (If YES, please provide more detail): No  · Any other major medical conditions/concerns? 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 None    Family History:  Have any of your "first degree relatives" (parent, brother, sister, or child) had any of the following       · Skin cancer such as Melanoma or Merkel Cell Carcinoma or Pancreatic Cancer? YES, Father: Pancreatic Cancer  · Eczema, Asthma, Hay Fever or Seasonal Allergies: YES, Asthma, Hay Fever, Seasonal Allergies  · Psoriasis or Psoriatic Arthritis: No  · Do any other medical conditions seem to run in your family? If Yes, what condition and which relatives?   No    Current Medications:   (please update all dermatological medications before printing patient's AVS!)      Current Outpatient Medications:     albuterol (PROVENTIL HFA,VENTOLIN HFA) 90 mcg/act inhaler, Inhale 1 puff as needed , Disp: , Rfl:     buPROPion (WELLBUTRIN SR) 150 mg 12 hr tablet, take 1 tablet by mouth twice a day, Disp: 60 tablet, Rfl: 1    cholecalciferol (VITAMIN D3) 1,000 units tablet, Take by mouth, Disp: , Rfl:     fluticasone (FLONASE) 50 mcg/act nasal spray, 1 spray into each nostril as needed , Disp: , Rfl:     fluticasone-vilanterol (BREO ELLIPTA) 200-25 MCG/INH inhaler, inhalation 1 puff by mouth once daily, Disp: , Rfl:     meloxicam (MOBIC) 15 mg tablet, take 1 tablet by mouth once daily (Patient not taking: Reported on 1/18/2021), Disp: 30 tablet, Rfl: 2    Multiple Vitamins-Minerals (HM HAIR/SKIN/NAILS PO), Take by mouth, Disp: , Rfl:     multivitamin (THERAGRAN) TABS, Take 1 tablet by mouth, Disp: , Rfl:     Nutritional Supplements (OSTEO ADVANCE PO), Take by mouth, Disp: , Rfl:     Omega-3 Fatty Acids (FISH OIL) 1,000 mg, Take 1,000 mg by mouth daily, Disp: , Rfl:     omeprazole (PriLOSEC) 20 mg delayed release capsule, Take 20 mg by mouth daily, Disp: , Rfl:     ranitidine (ZANTAC) 150 mg tablet, Take 150 mg by mouth as needed , Disp: , Rfl:       Review of Systems:  Have you recently had or currently have any of the following? If YES, what are you doing for the problem? · Fever, chills or unintended weight loss: No  · Sudden loss or change in your vision: No  · Nausea, vomiting or blood in your stool: No  · Painful or swollen joints: No  · Wheezing or cough: No  · Changing mole or non-healing wound: No  · Nosebleeds: No  · Excessive sweating: No  · Easy or prolonged bleeding? No  · Over the last 2 weeks, how often have you been bothered by the following problems?   · Taking little interest or pleasure in doing things: 2 - Several days  · Feeling down, depressed, or hopeless: 2 - Several days  · Rapid heartbeat with epinephrine:  No    · FEMALES ONLY: · Are you pregnant or planning to become pregnant? No  · Are you currently or planning to be nursing or breast feeding? No    · Any known allergies? Allergies   Allergen Reactions    Penicillins Shortness Of Breath   ·       Physical Exam:     Was a chaperone (Derm Clinical Assistant) present throughout the entire Physical Exam? Yes     Did the Dermatology Team specifically  the patient on the importance of a Full Skin Exam to be sure that nothing is missed clinically? Yes}  o Did the patient ultimately request or accept a Full Skin Exam?  Yes  o Did the patient specifically refuse to have the areas "under-the-bra" examined by the Dermatologist? No  o Did the patient specifically refuse to have the areas "under-the-underwear" examined by the Dermatologist? No    CONSTITUTIONAL:   There were no vitals filed for this visit        PSYCH: Normal mood and affect  EYES: Normal conjunctiva  ENT: Normal lips and oral mucosa  CARDIOVASCULAR: No edema  RESPIRATORY: Normal respirations  HEME/LYMPH/IMMUNO:  No regional lymphadenopathy except as noted below in "ASSESSMENT AND PLAN BY DIAGNOSIS"    SKIN:  FULL ORGAN SYSTEM EXAM   Hair, Scalp, Ears, Face Normal except as noted below in Assessment   Neck, Cervical Chain Nodes Normal except as noted below in Assessment   Right Arm/Hand/Fingers Normal except as noted below in Assessment   Left Arm/Hand/Fingers Normal except as noted below in Assessment   Chest/Axillae Viewed areas Normal except as noted below in Assessment   Abdomen, Umbilicus Normal except as noted below in Assessment   Back/Spine Normal except as noted below in Assessment   Groin/Genitalia/Buttocks NOT EXAMINED   Right Leg, Foot, Toes Normal except as noted below in Assessment   Left Leg, Foot, Toes Normal except as noted below in Assessment        Assessment and Plan by Diagnosis:    MELANOCYTIC NEVI ("Moles")    Physical Exam:   Anatomic Location Affected:   Mostly on sun-exposed areas of the trunk and extremities   Morphological Description:  Scattered, 1-4mm round to ovoid, symmetrical-appearing, even bordered, skin colored to dark brown macules/papules, mostly in sun-exposed areas   Pertinent Positives:   Pertinent Negatives: Additional History of Present Condition:      Assessment and Plan:  Based on a thorough discussion of this condition and the management approach to it (including a comprehensive discussion of the known risks, side effects and potential benefits of treatment), the patient (family) agrees to implement the following specific plan:   When outside we recommend using a wide brim hat, sunglasses, long sleeve and pants, sunscreen with SPF 44+ with reapplication every 2 hours, or SPF specific clothing    Benign, reassured   Annual skin check     Melanocytic Nevi  Melanocytic nevi ("moles") are tan or brown, raised or flat areas of the skin which have an increased number of melanocytes  Melanocytes are the cells in our body which make pigment and account for skin color  Some moles are present at birth (I e , "congenital nevi"), while others come up later in life (i e , "acquired nevi")  The sun can stimulate the body to make more moles  Sunburns are not the only thing that triggers more moles  Chronic sun exposure can do it too  Clinically distinguishing a healthy mole from melanoma may be difficult, even for experienced dermatologists  The "ABCDE's" of moles have been suggested as a means of helping to alert a person to a suspicious mole and the possible increased risk of melanoma  The suggestions for raising alert are as follows:    Asymmetry: Healthy moles tend to be symmetric, while melanomas are often asymmetric  Asymmetry means if you draw a line through the mole, the two halves do not match in color, size, shape, or surface texture   Asymmetry can be a result of rapid enlargement of a mole, the development of a raised area on a previously flat lesion, scaling, ulceration, bleeding or scabbing within the mole  Any mole that starts to demonstrate "asymmetry" should be examined promptly by a board certified dermatologist      Border: Healthy moles tend to have discrete, even borders  The border of a melanoma often blends into the normal skin and does not sharply delineate the mole from normal skin  Any mole that starts to demonstrate "uneven borders" should be examined promptly by a board certified dermatologist      Color: Healthy moles tend to be one color throughout  Melanomas tend to be made up of different colors ranging from dark black, blue, white, or red  Any mole that demonstrates a color change should be examined promptly by a board certified dermatologist      Diameter: Healthy moles tend to be smaller than 0 6 cm in size; an exception are "congenital nevi" that can be larger  Melanomas tend to grow and can often be greater than 0 6 cm (1/4 of an inch, or the size of a pencil eraser)  This is only a guideline, and many normal moles may be larger than 0 6 cm without being unhealthy  Any mole that starts to change in size (small to bigger or bigger to smaller) should be examined promptly by a board certified dermatologist      Evolving: Healthy moles tend to "stay the same "  Melanomas may often show signs of change or evolution such as a change in size, shape, color, or elevation  Any mole that starts to itch, bleed, crust, burn, hurt, or ulcerate or demonstrate a change or evolution should be examined promptly by a board certified dermatologist       Dysplastic Nevi  Dysplastic moles are moles that fit the ABCDE rules of melanoma but are not identified as melanomas when examined under the microscope  They may indicate an increased risk of melanoma in that person  If there is a family history of melanoma, most experts agree that the person may be at an increased risk for developing a melanoma    Experts still do not agree on what dysplastic moles mean in patients without a personal or family history of melanoma  Dysplastic moles are usually larger than common moles and have different colors within it with irregular borders  The appearance can be very similar to a melanoma  Biopsies of dysplastic moles may show abnormalities which are different from a regular mole  Melanoma  Malignant melanoma is a type of skin cancer that can be deadly if it spreads throughout the body  The incidence of melanoma in the [place] is growing faster than any other cancer  Melanoma usually grows near the surface of the skin for a period of time, and then begins to grow deeper into the skin  Once it grows deeper into the skin, the risk of spread to other organs greatly increases  Therefore, early detection and removal of a malignant melanoma may result in a better chance at a complete cure; removal after the tumor has spread may not be as effective, leading to worse clinical outcomes such as death  The true rate of nevus transformation into a melanoma is unknown  It has been estimated that the lifetime risk for any acquired melanocytic nevus on any 21year-old individual transforming into melanoma by age [de-identified] is 0 03% (1 in 3,164) for men and 0 009% (1 in 10,800) for women  The appearance of a "new mole" remains one of the most reliable methods for identifying a malignant melanoma  Occasionally, melanomas appear as rapidly growing, blue-black, dome-shaped bumps within a previous mole or previous area of normal skin  Other times, melanomas are suspected when a mole suddenly appears or changes  Itching, burning, or pain in a pigmented lesion should increase suspicion, but most patients with early melanoma have no skin discomfort whatsoever  Melanoma can occur anywhere on the skin, including areas that are difficult for self-examination  Many melanomas are first noticed by other family members  Suspicious-looking moles may be removed for microscopic examination         You may be able to prevent death from melanoma by doing two simple things:    1  Try to avoid unnecessary sun exposure and protect your skin when it is exposed to the sun  People who live near the equator, people who have intermittent exposures to large amounts of sun, and people who have had sunburns in childhood or adolescence have an increased risk for melanoma  Sun sense and vigilant sun protection may be keys to helping to prevent melanoma  We recommend wearing UPF-rated sun protective clothing and sunglasses whenever possible and applying a moisturizer-sunscreen combination product (SPF 50+) such as Neutrogena Daily Defense to sun exposed areas of skin at least three times a day  2  Have your moles regularly examined by a board certified dermatologist AND by yourself or a family member/friend at home  We recommend that you have your moles examined at least once a year by a board certified dermatologist   Use your birthday as an annual reminder to have your "Birthday Suit" (I e , your skin) examined; it is a nice birthday gift to yourself to know that your skin is healthy appearing! Additionally, at-home self examinations may be helpful for detecting a possible melanoma  Use the ABCDEs we discussed and check your moles once a month at home  LENTIGO    Physical Exam:   Anatomic Location Affected:  Shoulders and arms   Morphological Description:  Light brown macules   Pertinent Positives:   Pertinent Negatives:     Additional History of Present Condition:      Assessment and Plan:  Based on a thorough discussion of this condition and the management approach to it (including a comprehensive discussion of the known risks, side effects and potential benefits of treatment), the patient (family) agrees to implement the following specific plan:   When outside we recommend using a wide brim hat, sunglasses, long sleeve and pants, sunscreen with SPF 74+ with reapplication every 2 hours, or SPF specific clothing       What is a lentigo? A lentigo is a pigmented flat or slightly raised lesion with a clearly defined edge  Unlike an ephelis (freckle), it does not fade in the winter months  There are several kinds of lentigo  The name lentigo originally referred to its appearance resembling a small lentil  The plural of lentigo is lentigines, although lentigos is also in common use  Who gets lentigines? Lentigines can affect males and females of all ages and races  Solar lentigines are especially prevalent in fair skinned adults  Lentigines associated with syndromes are present at birth or arise during childhood  What causes lentigines? Common forms of lentigo are due to exposure to ultraviolet radiation:   Sun damage including sunburn    Indoor tanning    Phototherapy, especially photochemotherapy (PUVA)    Ionizing radiation, eg radiation therapy, can also cause lentigines  Several familial syndromes associated with widespread lentigines originate from mutations in Sagar-MAP kinase, mTOR signaling and PTEN pathways  What are the clinical features of lentigines? Lentigines have been classified into several different types depending on what they look like, where they appear on the body, causative factors, and whether they are associated to other diseases or conditions  Lentigines may be solitary or more often, multiple  Most lentigines are smaller than 5 mm in diameter      Lentigo simplex   A precursor to junctional naevus    Arises during childhood and early adult life    Found on trunk and limbs    Small brown round or oval macule or thin plaque    Jagged or smooth edge    May have a dry surface    May disappear in time  Solar lentigo   A precursor to seborrhoeic keratosis    Found on chronically sun exposed sites such as hands, face, lower legs    May also follow sunburn to shoulders    Yellow, light or dark brown regular or irregular macule or thin plaque    May have a dry surface  Often has moth-eaten outline    Can slowly enlarge to several centimeters in diameter    May disappear, often through the process known as lichenoid keratosis    When atypical in appearance, may be difficult to distinguish from melanoma in situ  Ink spot lentigo   Also known as reticulated lentigo    Few in number compared to solar lentigines    Follows sunburn in very fair skinned individuals    Dark brown to black irregular ink spot-like macule  PUVA lentigo   Similar to ink spot lentigo but follows photochemotherapy (PUVA)    Location anywhere exposed to PUVA  Tanning bed lentigo   Similar to ink spot lentigo but follows indoor tanning    Location anywhere exposed to tanning bed  Radiation lentigo   Occurs in site of irradiation (accidental or therapeutic)    Associated with late-stage radiation dermatitis: epidermal atrophy, subcutaneous fibrosis, keratosis, telangiectasias  Melanotic macule   Mucosal surfaces or adjacent glabrous skin eg lip, vulva, penis, anus    Light to dark brown    Also called mucosal melanosis  Generalised lentigines   Found on any exposed or covered site from early childhood    Small macules may merge to form larger patches    Not associated with a syndrome    Also called lentigines profusa, multiple lentigines  Agminated lentigines   Naevoid eruption of lentigos confined to a single segmental area    Sharp demarcation in midline    May have associated neurological and developmental abnormalities  Patterned lentigines   Inherited tendency to lentigines on face, lips, buttocks, palms, soles    Recognised mainly in people of  ethnicity  Centrofacial neurodysraphic lentiginosis  Associated with mental retardation  Lentiginosis syndromes   Syndromes include LEOPARD/Hanska, Peutz-Jeghers, Laugier-Hunziker, Moynahan, Xeroderma pigmentosum, myxoma syndromes (VALLEJO, NAME, Gannon), Ruvalcaba-Myhre-Ricketts, Bannayan-Zonnana syndrome, Cowden disease (multiple hamartoma syndrome )    Inheritance is autosomal dominant; sporadic cases common    Widespread lentigines present at birth or arise in early childhood    Associated with neural, endocrine, and mesenchymal tumors    How is the diagnosis made? Lentigines are usually diagnosed clinically by their typical appearance  Concern regarding possibility of melanoma may lead to:   Dermatoscopy    Diagnostic excision biopsy    Histopathology of a lentigo shows:   Thickened epidermis    An increased number of melanocytes along the basal layer of epidermis    Unlike junctional melanocytic naevus, there are no nests of melanocytes    Increased melanin pigment within the keratinocytes    Additional features depending on type of lentigo    In contrast, an ephelis (freckle) shows sun-induced increased melanin within the keratinocytes, without an increase in number of cells  What is the treatment for lentigines? Most lentigines are left alone  Attempts to lighten them may not be successful  The following approaches are used:   SPF 50+ broad-spectrum sunscreen    Hydroquinone bleaching cream    Alpha hydroxy acids    Vitamin C    Retinoids    Azelaic acid    Chemical peels  Individual lesions can be permanently removed using:   Cryotherapy    Intense pulsed light    Pigment lasers    How can lentigines be prevented? Lentigines associated with exposure ultraviolet radiation can be prevented by very careful sun protection  Clothing is more successful at preventing new lentigines than are sunscreens  What is the outlook for lentigines? Lentigines usually persist  They may increase in number with age and sun exposure  Some in sun-protected sites may fade and disappear  BRADLEY ANGIOMAS    Physical Exam:   Anatomic Location Affected:  trunk   Morphological Description:  Scattered cherry red, 1-4 mm papules   Pertinent Positives:   Pertinent Negatives:     Additional History of Present Condition: Assessment and Plan:  Based on a thorough discussion of this condition and the management approach to it (including a comprehensive discussion of the known risks, side effects and potential benefits of treatment), the patient (family) agrees to implement the following specific plan:   Monitor for changes   Benign, reassured       Assessment and Plan:    Cherry angioma, also known as [employer] spots, are benign vascular skin lesions  A "cherry angioma" is a firm red, blue or purple papule, 0 1-1 cm in diameter  When thrombosed, they can appear black in colour until evaluated with a dermatoscope when the red or purple colour is more easily seen  Cherry angioma may develop on any part of the body but most often appear on the scalp, face, lips and trunk  An angioma is due to proliferating endothelial cells; these are the cells that line the inside of a blood vessel  Angiomas can arise in early life or later in life; the most common type of angioma is a cherry angioma  Cherry angiomas are very common in males and females of any age or race  They are more noticeable in white skin than in skin of colour  They markedly increase in number from about the age of 36  There may be a family history of similar lesions  Eruptive cherry angiomas have been rarely reported to be associated with internal malignancy  The cause of angiomas is unknown  Genetic analysis of cherry angiomas has shown that they frequently carry specific somatic missense mutations in the GNAQ and GNA11 (Q209H) genes, which are involved in other vascular and melanocytic proliferations  Cherry angioma is usually diagnosed clinically and no investigations are necessary for the majority of lesions  It has a characteristic red-clod or lobular pattern on dermatoscopy (called lacunar pattern using conventional pattern analysis)  When there is uncertainty about the diagnosis, a biopsy may be performed   The angioma is composed of venules in a thickened papillary dermis  Collagen bundles may be prominent between the lobules  Cherry angiomas are harmless, so they do not usually have to be treated  Occasionally, they are removed to exclude a malignant skin lesion such as a nodular melanoma or because they are irritated or bleeding (and a subsequent risk for infection)  To decrease friction over the lesions, we recommend Neutrogena Daily Defense SPF 50+ at least 3 times a day  XEROSIS ("DRY SKIN")    Physical Exam:   Anatomic Location Affected:  diffuse   Morphological Description:  xerosis   Pertinent Positives:   Pertinent Negatives: Additional History of Present Condition:      Assessment and Plan:  Based on a thorough discussion of this condition and the management approach to it (including a comprehensive discussion of the known risks, side effects and potential benefits of treatment), the patient (family) agrees to implement the following specific plan:   Use moisturizer like Eucerin,Cerave or Aveeno Cream 3 times a day for the dry skin               Dry skin refers to skin that feels dry to touch  Dry skin has a dull surface with a rough, scaly quality  The skin is less pliable and cracked  When dryness is severe, the skin may become inflamed and fissured  Although any body site can be dry, dry skin tends to affect the shins more than any other site  Dry skin is lacking moisture in the outer horny cell layer (stratum corneum) and this results in cracks in the skin surface  Dry skin is also called xerosis, xeroderma or asteatosis (lack of fat)  It can affect males and females of all ages  There is some racial variability in water and lipid content of the skin   Dry skin that starts in early childhood may be one of about 20 types of ichthyosis (fish-scale skin)  There is often a family history of dry skin   Dry skin is commonly seen in people with atopic dermatitis   Nearly everyone > 60 years has dry skin      Dry skin that begins later may be seen in people with certain diseases and conditions   Postmenopausal women   Hypothyroidism   Chronic renal disease    Malnutrition and weight loss    Subclinical dermatitis    Treatment with certain drugs such as oral retinoids, diuretics and epidermal growth factor receptor inhibitors    People exposed to a dry environment may experience dry skin   Low humidity: in desert climates or cool, windy conditions    Excessive air conditioning    Direct heat from a fire or fan heater    Excessive bathing    Contact with soap, detergents and solvents    Inappropriate topical agents such as alcohol    Frictional irritation from rough clothing or abrasives    Dry skin is due to abnormalities in the integrity of the barrier function of the stratum corneum, which is made up of corneocytes   There is an overall reduction in the lipids in the stratum corneum   Ratio of ceramides, cholesterol and free fatty acids may be normal or altered   There may be a reduction in the proliferation of keratinocytes   Keratinocyte subtypes change in dry skin with a decrease in keratins K1, K10 and increase in K5, K14     Involucrin (a protein) may be expressed early, increasing cell stiffness   The result is retention of corneocytes and reduced water-holding capacity  The inherited forms of ichthyosis are due to loss of function mutations in various genes (listed in parentheses below)  The clinical features of ichthyosis depend on the specific type of ichthyosis:   Ichthyosis vulgaris (FLG)      Recessive X-linked ichthyosis (STS)    Autosomal recessive congenital ichthyosis (ABCA12, TGM1, ALOXE3)    Keratinopathic ichthyoses (KRT1, KRT10, KRT2)    Acquired ichthyosis may be due to:   Metabolic factors: thyroid deficiency    Illness: lymphoma, internal malignancy, sarcoidosis, HIV infection    Drugs: nicotinic acid, kava, protein kinase inhibitors (eg EGFR inhibitors), hydroxyurea  Complications of dry skin:  Dry areas of skin may become itchy, indicating a form of eczema/dermatitis has developed   Atopic eczema -- especially in people with ichthyosis vulgaris    Eczema craquelé -- especially in elderly people  Also called asteatotic eczema    A dry form of nummular dermatitis/discoid eczema -- especially in people that wash their skin excessively  When the dry skin of an elderly person is itchy without a visible rash, it is sometimes called winter itch, 7th age itch, senile pruritus or chronic pruritus of the elderly  Other complications of dry skin may include:   Skin infection when bacteria or viruses penetrate a break in the skin surface    Overheating, especially in some forms of ichthyosis    Food allergy, eg, to peanuts, has been associated with filaggrin mutations    Contact allergy, eg, to nickel, has also been correlated with barrier function defects  How is the type of dry skin diagnosed? The type of dry skin is diagnosed by careful history and examination  In children:   Family history    Age of onset    Appearance at birth, if known    Distribution of dry skin    Other features, eg eczema, abnormal nails, hair, dentition, sight, hearing  In adults:   Medical history    Medications and topical preparations    Bathing frequency and use of soap    Evaluation of environmental factors that may contribute to dry skin  What is the treatment for dry skin? The mainstay of treatment of dry skin and ichthyosis is moisturisers/emollients  They should be applied liberally and often enough to:   Reduce itch    Improve the barrier function    Prevent entry of irritants, bacteria    Reduce transepidermal water loss  When considering which emollient is most suitable, consider:   Severity of the dryness    Tolerance    Personal preference    Cost and availability      Emollients generally work best if applied to damp skin, if pH is below 7 (acidic), and if containing humectants such as urea or propylene glycol  Additional treatments include:   Topical steroid if itchy or there is dermatitis -- choose an emollient base    Topical calcineurin inhibitors if topical steroids are unsuitable  How can dry skin be prevented? Eliminate aggravating factors:   Reduce the frequency of bathing   A humidifier in winter and air conditioner in summer    Compare having a short shower with a prolonged soak in a bath   Use lukewarm, not hot, water   Replace standard soap with a substitute such as a synthetic detergent cleanser, water-miscible emollient, bath oil, anti-pruritic tar oil, colloidal oatmeal etc     Apply an emollient liberally and often, particularly shortly after bathing, and when itchy  The drier the skin, the thicker this should be, especially on the hands  What is the outlook for dry skin? A tendency to dry skin may persist life-long, or it may improve once contributing factors are controlled  [facility]  Physical Exam:   Anatomic Location Affected:  Left cheek   Morphological Description:  Multiple open comedones    Pertinent Positives:   Pertinent Negatives: Additional History of Present Condition:      Assessment and Plan:  Based on a thorough discussion of this condition and the management approach to it (including a comprehensive discussion of the known risks, side effects and potential benefits of treatment), the patient (family) agrees to implement the following specific plan:   When outside we recommend using a wide brim hat, sunglasses, long sleeve and pants, sunscreen with SPF 86+ with reapplication every 2 hours, or SPF specific clothing    Recommend using over the counter Differin Gel  Use a pea sized amount to face at night  Start slowly because it will irritate your skin   Start one night a week for a couple weeks, then 2 nights a weeks for a couple weeks, and slowly increase to nightly   Extraction attempted  No charge  If you like the results you can call to schedule a cosmetic visit, cost is $150 for 10 spots  MILIUM     Physical Exam:   Anatomic Location Affected:  Left cheek   Morphological Description:  White papules   Pertinent Positives:   Pertinent Negatives: Additional History of Present Condition:      Assessment and Plan:  Based on a thorough discussion of this condition and the management approach to it (including a comprehensive discussion of the known risks, side effects and potential benefits of treatment), the patient (family) agrees to implement the following specific plan:   Benign, assurance provided    Assessment and Plan  A milium is a small cyst containing keratin (the skin protein); they are usually multiple and are then known as milia  These harmless cysts present as tiny pearly-white bumps just under the surface of the skin  Milia are common in all ages and both sexes  They most often arise on the face and are particularly prominent on the eyelids and cheeks, but they may occur elsewhere  There are various kinds of milia    milia: Affect 40-50% of  babies, few to numerous lesions, often seen on the nose, but may also arise inside the mouth on the mucosa ([place] pearls) or palate ([place] nodules) or more widely on the scalp, face and upper trunk, Heal spontaneously within a few weeks of birth   Primary milia in children and adults: found around eyelids, cheeks, forehead and genitalia, in young children, a row of milia may appear along the nasal crease, may clear in a few weeks or persist for months or longer   Juvenile milia: associated with Rombo syndrome, basal cell naevus syndrome, Grllu-Txmaf-Rrxetbuk syndrome, pachyonychia congenita, Bhatia syndrome and other genetic disorders, may be congenital (present at birth) or appear later in life     Milia en plaque: multiple milia appear on within an inflamed plaque up to several centimeters in diameter, usually found on an eyelid, behind the ear, on a cheek or jaw  3  Affect children and adults, especially middle-aged women  4  Sometimes associated with another skin disease including pseudoxanthoma elasticum, discoid lupus erythematosus, lichen planus   Multiple eruptive milia: crops of numerous milia appear over a few weeks to months, lesions may be asymptomatic or itchy, most often affect the face, upper arms and upper trunk   Traumatic milia: occur at the site of injury as skin heals, arise from eccrine sweat ducts, examples include thermal burns, dermabrasion, blistering rashes such as bullous pemphigoid, often seen on the back of hands and fingers in porphyria cutanea tarda, a milia-like calcified nodule may develop after  heel stick blood test     Milia associated with drugs: may rarely follow the use of topical medication, such as phenols, hydroquinone, 5-fluorouracil cream, and a corticosteroid  Milia have a characteristic appearance  However, on occasion, a skin biopsy may be performed  This shows a small epidermoid cyst coming from a vellus hair follicle  Milia should be distinguished from other types of cyst, comedones, xanthelasma and syringomas  Colloid milia are house coloured bumps on cheeks and temples associated with excessive exposure to sunlight  They should also be distinguished from milia-like cysts noted on dermoscopy in seborrhoeic keratoses, papillomatous moles and some basal cell carcinomas  Milia do not need to be treated unless they are a cause for concern for the patient  They often clear up by themselves within a few months  Where possible, further trauma should be minimised to reduce the development of new lesions   The lesion may be de-roofed using a sterile needle or blade and the contents squeezed or pricked out   They may be destroyed using diathermy and curettage, or cryotherapy     For widespread lesions, topical retinoids may be helpful   Chemical peels, dermabrasion and laser ablation have been reported to be effective when used for very extensive milia   Milia en plaque may improve with minocycline (a tetracycline antibiotic)      Scribe Attestation    I,:  Christian Templeton am acting as a scribe while in the presence of the attending physician :       I,:  Benson Chambers MD personally performed the services described in this documentation    as scribed in my presence :

## 2021-05-13 NOTE — PATIENT INSTRUCTIONS
MELANOCYTIC NEVI ("Moles")  Assessment and Plan:  Based on a thorough discussion of this condition and the management approach to it (including a comprehensive discussion of the known risks, side effects and potential benefits of treatment), the patient (family) agrees to implement the following specific plan:   When outside we recommend using a wide brim hat, sunglasses, long sleeve and pants, sunscreen with SPF 29+ with reapplication every 2 hours, or SPF specific clothing    Benign, reassured   Annual skin check     Melanocytic Nevi  Melanocytic nevi ("moles") are tan or brown, raised or flat areas of the skin which have an increased number of melanocytes  Melanocytes are the cells in our body which make pigment and account for skin color  Some moles are present at birth (I e , "congenital nevi"), while others come up later in life (i e , "acquired nevi")  The sun can stimulate the body to make more moles  Sunburns are not the only thing that triggers more moles  Chronic sun exposure can do it too  Clinically distinguishing a healthy mole from melanoma may be difficult, even for experienced dermatologists  The "ABCDE's" of moles have been suggested as a means of helping to alert a person to a suspicious mole and the possible increased risk of melanoma  The suggestions for raising alert are as follows:    Asymmetry: Healthy moles tend to be symmetric, while melanomas are often asymmetric  Asymmetry means if you draw a line through the mole, the two halves do not match in color, size, shape, or surface texture  Asymmetry can be a result of rapid enlargement of a mole, the development of a raised area on a previously flat lesion, scaling, ulceration, bleeding or scabbing within the mole  Any mole that starts to demonstrate "asymmetry" should be examined promptly by a board certified dermatologist      Border: Healthy moles tend to have discrete, even borders    The border of a melanoma often blends into the normal skin and does not sharply delineate the mole from normal skin  Any mole that starts to demonstrate "uneven borders" should be examined promptly by a board certified dermatologist      Color: Healthy moles tend to be one color throughout  Melanomas tend to be made up of different colors ranging from dark black, blue, white, or red  Any mole that demonstrates a color change should be examined promptly by a board certified dermatologist      Diameter: Healthy moles tend to be smaller than 0 6 cm in size; an exception are "congenital nevi" that can be larger  Melanomas tend to grow and can often be greater than 0 6 cm (1/4 of an inch, or the size of a pencil eraser)  This is only a guideline, and many normal moles may be larger than 0 6 cm without being unhealthy  Any mole that starts to change in size (small to bigger or bigger to smaller) should be examined promptly by a board certified dermatologist      Evolving: Healthy moles tend to "stay the same "  Melanomas may often show signs of change or evolution such as a change in size, shape, color, or elevation  Any mole that starts to itch, bleed, crust, burn, hurt, or ulcerate or demonstrate a change or evolution should be examined promptly by a board certified dermatologist       Dysplastic Nevi  Dysplastic moles are moles that fit the ABCDE rules of melanoma but are not identified as melanomas when examined under the microscope  They may indicate an increased risk of melanoma in that person  If there is a family history of melanoma, most experts agree that the person may be at an increased risk for developing a melanoma  Experts still do not agree on what dysplastic moles mean in patients without a personal or family history of melanoma  Dysplastic moles are usually larger than common moles and have different colors within it with irregular borders  The appearance can be very similar to a melanoma   Biopsies of dysplastic moles may show abnormalities which are different from a regular mole  Melanoma  Malignant melanoma is a type of skin cancer that can be deadly if it spreads throughout the body  The incidence of melanoma in the [place] is growing faster than any other cancer  Melanoma usually grows near the surface of the skin for a period of time, and then begins to grow deeper into the skin  Once it grows deeper into the skin, the risk of spread to other organs greatly increases  Therefore, early detection and removal of a malignant melanoma may result in a better chance at a complete cure; removal after the tumor has spread may not be as effective, leading to worse clinical outcomes such as death  The true rate of nevus transformation into a melanoma is unknown  It has been estimated that the lifetime risk for any acquired melanocytic nevus on any 21year-old individual transforming into melanoma by age [de-identified] is 0 03% (1 in 3,164) for men and 0 009% (1 in 10,800) for women  The appearance of a "new mole" remains one of the most reliable methods for identifying a malignant melanoma  Occasionally, melanomas appear as rapidly growing, blue-black, dome-shaped bumps within a previous mole or previous area of normal skin  Other times, melanomas are suspected when a mole suddenly appears or changes  Itching, burning, or pain in a pigmented lesion should increase suspicion, but most patients with early melanoma have no skin discomfort whatsoever  Melanoma can occur anywhere on the skin, including areas that are difficult for self-examination  Many melanomas are first noticed by other family members  Suspicious-looking moles may be removed for microscopic examination  You may be able to prevent death from melanoma by doing two simple things:    1  Try to avoid unnecessary sun exposure and protect your skin when it is exposed to the sun    People who live near the equator, people who have intermittent exposures to large amounts of sun, and people who have had sunburns in childhood or adolescence have an increased risk for melanoma  Sun sense and vigilant sun protection may be keys to helping to prevent melanoma  We recommend wearing UPF-rated sun protective clothing and sunglasses whenever possible and applying a moisturizer-sunscreen combination product (SPF 50+) such as Neutrogena Daily Defense to sun exposed areas of skin at least three times a day  2  Have your moles regularly examined by a board certified dermatologist AND by yourself or a family member/friend at home  We recommend that you have your moles examined at least once a year by a board certified dermatologist   Use your birthday as an annual reminder to have your "Birthday Suit" (I e , your skin) examined; it is a nice birthday gift to yourself to know that your skin is healthy appearing! Additionally, at-home self examinations may be helpful for detecting a possible melanoma  Use the ABCDEs we discussed and check your moles once a month at home  LENTIGO  Assessment and Plan:  Based on a thorough discussion of this condition and the management approach to it (including a comprehensive discussion of the known risks, side effects and potential benefits of treatment), the patient (family) agrees to implement the following specific plan:   When outside we recommend using a wide brim hat, sunglasses, long sleeve and pants, sunscreen with SPF 30+ with reapplication every 2 hours, or SPF specific clothing       What is a lentigo? A lentigo is a pigmented flat or slightly raised lesion with a clearly defined edge  Unlike an ephelis (freckle), it does not fade in the winter months  There are several kinds of lentigo  The name lentigo originally referred to its appearance resembling a small lentil  The plural of lentigo is lentigines, although lentigos is also in common use  Who gets lentigines? Lentigines can affect males and females of all ages and races   Solar lentigines are especially prevalent in fair skinned adults  Lentigines associated with syndromes are present at birth or arise during childhood  What causes lentigines? Common forms of lentigo are due to exposure to ultraviolet radiation:   Sun damage including sunburn    Indoor tanning    Phototherapy, especially photochemotherapy (PUVA)    Ionizing radiation, eg radiation therapy, can also cause lentigines  Several familial syndromes associated with widespread lentigines originate from mutations in Sagar-MAP kinase, mTOR signaling and PTEN pathways  What are the clinical features of lentigines? Lentigines have been classified into several different types depending on what they look like, where they appear on the body, causative factors, and whether they are associated to other diseases or conditions  Lentigines may be solitary or more often, multiple  Most lentigines are smaller than 5 mm in diameter      Lentigo simplex   A precursor to junctional naevus    Arises during childhood and early adult life    Found on trunk and limbs    Small brown round or oval macule or thin plaque    Jagged or smooth edge    May have a dry surface    May disappear in time  Solar lentigo   A precursor to seborrhoeic keratosis    Found on chronically sun exposed sites such as hands, face, lower legs    May also follow sunburn to shoulders    Yellow, light or dark brown regular or irregular macule or thin plaque    May have a dry surface    Often has moth-eaten outline    Can slowly enlarge to several centimeters in diameter    May disappear, often through the process known as lichenoid keratosis    When atypical in appearance, may be difficult to distinguish from melanoma in situ  Ink spot lentigo   Also known as reticulated lentigo    Few in number compared to solar lentigines    Follows sunburn in very fair skinned individuals    Dark brown to black irregular ink spot-like macule  PUVA lentigo   Similar to ink spot lentigo but follows photochemotherapy (PUVA)    Location anywhere exposed to PUVA  Tanning bed lentigo   Similar to ink spot lentigo but follows indoor tanning    Location anywhere exposed to tanning bed  Radiation lentigo   Occurs in site of irradiation (accidental or therapeutic)    Associated with late-stage radiation dermatitis: epidermal atrophy, subcutaneous fibrosis, keratosis, telangiectasias  Melanotic macule   Mucosal surfaces or adjacent glabrous skin eg lip, vulva, penis, anus    Light to dark brown    Also called mucosal melanosis  Generalised lentigines   Found on any exposed or covered site from early childhood    Small macules may merge to form larger patches    Not associated with a syndrome    Also called lentigines profusa, multiple lentigines  Agminated lentigines   Naevoid eruption of lentigos confined to a single segmental area    Sharp demarcation in midline    May have associated neurological and developmental abnormalities  Patterned lentigines   Inherited tendency to lentigines on face, lips, buttocks, palms, soles    Recognised mainly in people of  ethnicity  Centrofacial neurodysraphic lentiginosis  Associated with mental retardation  Lentiginosis syndromes   Syndromes include LEOPARD/[place], Peutz-Jeghers, Laugier-Hunziker, Moynahan, Xeroderma pigmentosum, myxoma syndromes (VALLEJO, NAME, Gannon), Ruvalcaba-Myhre-Ricketts, Bannayan-Zonnana syndrome, Cowden disease (multiple hamartoma syndrome )    Inheritance is autosomal dominant; sporadic cases common    Widespread lentigines present at birth or arise in early childhood    Associated with neural, endocrine, and mesenchymal tumors    How is the diagnosis made? Lentigines are usually diagnosed clinically by their typical appearance   Concern regarding possibility of melanoma may lead to:   Dermatoscopy    Diagnostic excision biopsy    Histopathology of a lentigo shows:   Thickened epidermis    An increased number of melanocytes along the basal layer of epidermis    Unlike junctional melanocytic naevus, there are no nests of melanocytes    Increased melanin pigment within the keratinocytes    Additional features depending on type of lentigo    In contrast, an ephelis (freckle) shows sun-induced increased melanin within the keratinocytes, without an increase in number of cells  What is the treatment for lentigines? Most lentigines are left alone  Attempts to lighten them may not be successful  The following approaches are used:   SPF 50+ broad-spectrum sunscreen    Hydroquinone bleaching cream    Alpha hydroxy acids    Vitamin C    Retinoids    Azelaic acid    Chemical peels  Individual lesions can be permanently removed using:   Cryotherapy    Intense pulsed light    Pigment lasers    How can lentigines be prevented? Lentigines associated with exposure ultraviolet radiation can be prevented by very careful sun protection  Clothing is more successful at preventing new lentigines than are sunscreens  What is the outlook for lentigines? Lentigines usually persist  They may increase in number with age and sun exposure  Some in sun-protected sites may fade and disappear  BRADLEY ANGIOMAS  Assessment and Plan:  Based on a thorough discussion of this condition and the management approach to it (including a comprehensive discussion of the known risks, side effects and potential benefits of treatment), the patient (family) agrees to implement the following specific plan:   Monitor for changes   Benign, reassured       Assessment and Plan:    Cherry angioma, also known as [employer] spots, are benign vascular skin lesions  A "cherry angioma" is a firm red, blue or purple papule, 0 1-1 cm in diameter  When thrombosed, they can appear black in colour until evaluated with a dermatoscope when the red or purple colour is more easily seen   Cherry angioma may develop on any part of the body but most often appear on the scalp, face, lips and trunk  An angioma is due to proliferating endothelial cells; these are the cells that line the inside of a blood vessel  Angiomas can arise in early life or later in life; the most common type of angioma is a cherry angioma  Cherry angiomas are very common in males and females of any age or race  They are more noticeable in white skin than in skin of colour  They markedly increase in number from about the age of 36  There may be a family history of similar lesions  Eruptive cherry angiomas have been rarely reported to be associated with internal malignancy  The cause of angiomas is unknown  Genetic analysis of cherry angiomas has shown that they frequently carry specific somatic missense mutations in the GNAQ and GNA11 (Q209H) genes, which are involved in other vascular and melanocytic proliferations  Cherry angioma is usually diagnosed clinically and no investigations are necessary for the majority of lesions  It has a characteristic red-clod or lobular pattern on dermatoscopy (called lacunar pattern using conventional pattern analysis)  When there is uncertainty about the diagnosis, a biopsy may be performed  The angioma is composed of venules in a thickened papillary dermis  Collagen bundles may be prominent between the lobules  Cherry angiomas are harmless, so they do not usually have to be treated  Occasionally, they are removed to exclude a malignant skin lesion such as a nodular melanoma or because they are irritated or bleeding (and a subsequent risk for infection)  To decrease friction over the lesions, we recommend Neutrogena Daily Defense SPF 50+ at least 3 times a day      XEROSIS ("DRY SKIN")  Assessment and Plan:  Based on a thorough discussion of this condition and the management approach to it (including a comprehensive discussion of the known risks, side effects and potential benefits of treatment), the patient (family) agrees to implement the following specific plan:   Use moisturizer like Eucerin,Cerave or Aveeno Cream 3 times a day for the dry skin               Dry skin refers to skin that feels dry to touch  Dry skin has a dull surface with a rough, scaly quality  The skin is less pliable and cracked  When dryness is severe, the skin may become inflamed and fissured  Although any body site can be dry, dry skin tends to affect the shins more than any other site  Dry skin is lacking moisture in the outer horny cell layer (stratum corneum) and this results in cracks in the skin surface  Dry skin is also called xerosis, xeroderma or asteatosis (lack of fat)  It can affect males and females of all ages  There is some racial variability in water and lipid content of the skin   Dry skin that starts in early childhood may be one of about 20 types of ichthyosis (fish-scale skin)  There is often a family history of dry skin   Dry skin is commonly seen in people with atopic dermatitis   Nearly everyone > 60 years has dry skin  Dry skin that begins later may be seen in people with certain diseases and conditions   Postmenopausal women   Hypothyroidism   Chronic renal disease    Malnutrition and weight loss    Subclinical dermatitis    Treatment with certain drugs such as oral retinoids, diuretics and epidermal growth factor receptor inhibitors    People exposed to a dry environment may experience dry skin   Low humidity: in desert climates or cool, windy conditions    Excessive air conditioning    Direct heat from a fire or fan heater    Excessive bathing    Contact with soap, detergents and solvents    Inappropriate topical agents such as alcohol    Frictional irritation from rough clothing or abrasives    Dry skin is due to abnormalities in the integrity of the barrier function of the stratum corneum, which is made up of corneocytes     There is an overall reduction in the lipids in the stratum corneum   Ratio of ceramides, cholesterol and free fatty acids may be normal or altered   There may be a reduction in the proliferation of keratinocytes   Keratinocyte subtypes change in dry skin with a decrease in keratins K1, K10 and increase in K5, K14     Involucrin (a protein) may be expressed early, increasing cell stiffness   The result is retention of corneocytes and reduced water-holding capacity  The inherited forms of ichthyosis are due to loss of function mutations in various genes (listed in parentheses below)  The clinical features of ichthyosis depend on the specific type of ichthyosis:   Ichthyosis vulgaris (FLG)   Recessive X-linked ichthyosis (STS)    Autosomal recessive congenital ichthyosis (ABCA12, TGM1, ALOXE3)    Keratinopathic ichthyoses (KRT1, KRT10, KRT2)    Acquired ichthyosis may be due to:   Metabolic factors: thyroid deficiency    Illness: lymphoma, internal malignancy, sarcoidosis, HIV infection    Drugs: nicotinic acid, kava, protein kinase inhibitors (eg EGFR inhibitors), hydroxyurea  Complications of dry skin:  Dry areas of skin may become itchy, indicating a form of eczema/dermatitis has developed   Atopic eczema -- especially in people with ichthyosis vulgaris    Eczema craquelé -- especially in elderly people  Also called asteatotic eczema    A dry form of nummular dermatitis/discoid eczema -- especially in people that wash their skin excessively  When the dry skin of an elderly person is itchy without a visible rash, it is sometimes called winter itch, 7th age itch, senile pruritus or chronic pruritus of the elderly      Other complications of dry skin may include:   Skin infection when bacteria or viruses penetrate a break in the skin surface    Overheating, especially in some forms of ichthyosis    Food allergy, eg, to peanuts, has been associated with filaggrin mutations    Contact allergy, eg, to nickel, has also been correlated with barrier function defects  How is the type of dry skin diagnosed? The type of dry skin is diagnosed by careful history and examination  In children:   Family history    Age of onset    Appearance at birth, if known    Distribution of dry skin    Other features, eg eczema, abnormal nails, hair, dentition, sight, hearing  In adults:   Medical history    Medications and topical preparations    Bathing frequency and use of soap    Evaluation of environmental factors that may contribute to dry skin  What is the treatment for dry skin? The mainstay of treatment of dry skin and ichthyosis is moisturisers/emollients  They should be applied liberally and often enough to:   Reduce itch    Improve the barrier function    Prevent entry of irritants, bacteria    Reduce transepidermal water loss  When considering which emollient is most suitable, consider:   Severity of the dryness    Tolerance    Personal preference    Cost and availability  Emollients generally work best if applied to damp skin, if pH is below 7 (acidic), and if containing humectants such as urea or propylene glycol  Additional treatments include:   Topical steroid if itchy or there is dermatitis -- choose an emollient base    Topical calcineurin inhibitors if topical steroids are unsuitable  How can dry skin be prevented? Eliminate aggravating factors:   Reduce the frequency of bathing   A humidifier in winter and air conditioner in summer    Compare having a short shower with a prolonged soak in a bath   Use lukewarm, not hot, water   Replace standard soap with a substitute such as a synthetic detergent cleanser, water-miscible emollient, bath oil, anti-pruritic tar oil, colloidal oatmeal etc     Apply an emollient liberally and often, particularly shortly after bathing, and when itchy  The drier the skin, the thicker this should be, especially on the hands  What is the outlook for dry skin?   A tendency to dry skin may persist life-long, or it may improve once contributing factors are controlled  FAVRE RACOUCHOT SYNDROME  Assessment and Plan:  Based on a thorough discussion of this condition and the management approach to it (including a comprehensive discussion of the known risks, side effects and potential benefits of treatment), the patient (family) agrees to implement the following specific plan:   When outside we recommend using a wide brim hat, sunglasses, long sleeve and pants, sunscreen with SPF 00+ with reapplication every 2 hours, or SPF specific clothing    Recommend using over the counter Differin Gel  Use a pea sized amount to face at night  Start slowly because it will irritate your skin  Start one night a week for a couple weeks, then 2 nights a weeks for a couple weeks, and slowly increase to nightly   Extraction attempted  No charge  If you like the results you can call to schedule a cosmetic visit, cost is $150 for 10 spots  MILIUM   Assessment and Plan:  Based on a thorough discussion of this condition and the management approach to it (including a comprehensive discussion of the known risks, side effects and potential benefits of treatment), the patient (family) agrees to implement the following specific plan:   Benign, assurance provided    Assessment and Plan  A milium is a small cyst containing keratin (the skin protein); they are usually multiple and are then known as milia  These harmless cysts present as tiny pearly-white bumps just under the surface of the skin  Milia are common in all ages and both sexes  They most often arise on the face and are particularly prominent on the eyelids and cheeks, but they may occur elsewhere  There are various kinds of milia      milia: Affect 40-50% of  babies, few to numerous lesions, often seen on the nose, but may also arise inside the mouth on the mucosa (Piedad pearls) or palate ([place] nodules) or more widely on the scalp, face and upper trunk, Heal spontaneously within a few weeks of birth   Primary milia in children and adults: found around eyelids, cheeks, forehead and genitalia, in young children, a row of milia may appear along the nasal crease, may clear in a few weeks or persist for months or longer   Juvenile milia: associated with Rombo syndrome, basal cell naevus syndrome, Lwqgs-[id number]-Khkxnjpl syndrome, pachyonychia congenita, Bhatia syndrome and other genetic disorders, may be congenital (present at birth) or appear later in life   Milia en plaque: multiple milia appear on within an inflamed plaque up to several centimeters in diameter, usually found on an eyelid, behind the ear, on a cheek or jaw  3  Affect children and adults, especially middle-aged women  4  Sometimes associated with another skin disease including pseudoxanthoma elasticum, discoid lupus erythematosus, lichen planus   Multiple eruptive milia: crops of numerous milia appear over a few weeks to months, lesions may be asymptomatic or itchy, most often affect the face, upper arms and upper trunk   Traumatic milia: occur at the site of injury as skin heals, arise from eccrine sweat ducts, examples include thermal burns, dermabrasion, blistering rashes such as bullous pemphigoid, often seen on the back of hands and fingers in porphyria cutanea tarda, a milia-like calcified nodule may develop after  heel stick blood test     Milia associated with drugs: may rarely follow the use of topical medication, such as phenols, hydroquinone, 5-fluorouracil cream, and a corticosteroid  Milia have a characteristic appearance  However, on occasion, a skin biopsy may be performed  This shows a small epidermoid cyst coming from a vellus hair follicle  Milia should be distinguished from other types of cyst, comedones, xanthelasma and syringomas   Colloid milia are house coloured bumps on cheeks and temples associated with excessive exposure to sunlight  They should also be distinguished from milia-like cysts noted on dermoscopy in seborrhoeic keratoses, papillomatous moles and some basal cell carcinomas  Milia do not need to be treated unless they are a cause for concern for the patient  They often clear up by themselves within a few months  Where possible, further trauma should be minimised to reduce the development of new lesions   The lesion may be de-roofed using a sterile needle or blade and the contents squeezed or pricked out   They may be destroyed using diathermy and curettage, or cryotherapy   For widespread lesions, topical retinoids may be helpful   Chemical peels, dermabrasion and laser ablation have been reported to be effective when used for very extensive milia   Milia en plaque may improve with minocycline (a tetracycline antibiotic)

## 2021-05-17 ENCOUNTER — OFFICE VISIT (OUTPATIENT)
Dept: FAMILY MEDICINE CLINIC | Facility: CLINIC | Age: 51
End: 2021-05-17
Payer: COMMERCIAL

## 2021-05-17 VITALS
HEIGHT: 66 IN | TEMPERATURE: 97.8 F | DIASTOLIC BLOOD PRESSURE: 82 MMHG | SYSTOLIC BLOOD PRESSURE: 132 MMHG | HEART RATE: 70 BPM | BODY MASS INDEX: 31.12 KG/M2 | OXYGEN SATURATION: 99 % | WEIGHT: 193.6 LBS

## 2021-05-17 DIAGNOSIS — R13.10 DYSPHAGIA, UNSPECIFIED TYPE: Primary | ICD-10-CM

## 2021-05-17 DIAGNOSIS — K21.9 GASTROESOPHAGEAL REFLUX DISEASE WITHOUT ESOPHAGITIS: ICD-10-CM

## 2021-05-17 PROBLEM — B34.9 VIRAL INFECTION, UNSPECIFIED: Status: RESOLVED | Noted: 2021-03-25 | Resolved: 2021-05-17

## 2021-05-17 PROCEDURE — 99214 OFFICE O/P EST MOD 30 MIN: CPT | Performed by: FAMILY MEDICINE

## 2021-05-17 RX ORDER — PANTOPRAZOLE SODIUM 40 MG/1
40 TABLET, DELAYED RELEASE ORAL
Qty: 90 TABLET | Refills: 0 | Status: SHIPPED | OUTPATIENT
Start: 2021-05-17 | End: 2021-05-20 | Stop reason: SDUPTHER

## 2021-05-17 NOTE — PROGRESS NOTES
Assessment/Plan:       Problem List Items Addressed This Visit     None      Visit Diagnoses     Dysphagia, unspecified type    -  Primary    Relevant Medications    pantoprazole (PROTONIX) 40 mg tablet    Other Relevant Orders    Ambulatory referral to Gastroenterology    Gastroesophageal reflux disease without esophagitis        Relevant Medications    pantoprazole (PROTONIX) 40 mg tablet    Other Relevant Orders    Ambulatory referral to Gastroenterology          Start Protonix 40 mg  F/U with Bernarda Kantri for EGD    Subjective:      Patient ID: Yo Joy is a 44 y o  female  44year old here for trouble swallowing  She says it started about 2 weeks ago  She developed acid reflux symptoms at the time and then it felt like food was sticking  Symptoms occur with eating solids, not liquids  Says she can only eat soft/liquid foods  No sob, cp  She says she had similar symptoms about 8-10 years ago and had esophageal dilation -she forgets where she had this done  The following portions of the patient's history were reviewed and updated as appropriate:   She  has a past medical history of Anxiety, Asthma, Depression, GERD (gastroesophageal reflux disease), History of transfusion (2001), Hyperlipidemia, Moderate persistent asthma, and Obesity (BMI 30 0-34 9)    She   Patient Active Problem List    Diagnosis Date Noted    Encounter for observation for suspected exposure to other biological agents ruled out 03/25/2021    Right foot pain 01/18/2021    Smoking 10/08/2020    Complex tear of medial meniscus of right knee as current injury 09/01/2020    Familial hypercholesterolemia 07/02/2020    Chronic arthralgias of knees and hips 07/02/2020    Class 1 obesity due to excess calories with serious comorbidity and body mass index (BMI) of 30 0 to 30 9 in adult 07/02/2020    Anxiety 09/10/2019    Smoking trying to quit 07/02/2019    Grief at loss of child 06/18/2019    Psychophysiological insomnia 06/18/2019    Annual physical exam 06/18/2019    Perimenopausal menorrhagia 05/02/2018    Carpal tunnel syndrome of right wrist 02/06/2018    Osteoarthritis of spine with radiculopathy, cervical region 02/06/2018    Overweight (BMI 25 0-29 9) 04/04/2017    Elevated cholesterol 04/07/2016    Degenerative cervical disc 02/17/2016    Seasonal allergic rhinitis 02/17/2016    Moderate persistent asthma 01/14/2014     She  has a past surgical history that includes Abdominoplasty; Cholecystectomy; Colonoscopy; [place] tooth extraction; and pr knee scope,med/lat menisectomy (Right, 10/8/2020)  Her family history includes Colon cancer in her paternal grandfather; Diabetes in her maternal grandmother; Hypertension in her father  She  reports that she has been smoking cigarettes  She has been smoking about 0 25 packs per day  She has never used smokeless tobacco  She reports current alcohol use  She reports that she does not use drugs    Current Outpatient Medications   Medication Sig Dispense Refill    albuterol (PROVENTIL HFA,VENTOLIN HFA) 90 mcg/act inhaler Inhale 1 puff as needed       buPROPion (WELLBUTRIN SR) 150 mg 12 hr tablet take 1 tablet by mouth twice a day 60 tablet 1    cholecalciferol (VITAMIN D3) 1,000 units tablet Take by mouth      fluticasone (FLONASE) 50 mcg/act nasal spray 1 spray into each nostril as needed       fluticasone-vilanterol (BREO ELLIPTA) 200-25 MCG/INH inhaler inhalation 1 puff by mouth once daily      Multiple Vitamins-Minerals (HM HAIR/SKIN/NAILS PO) Take by mouth      multivitamin (THERAGRAN) TABS Take 1 tablet by mouth      Nutritional Supplements (OSTEO ADVANCE PO) Take by mouth      Omega-3 Fatty Acids (FISH OIL) 1,000 mg Take 1,000 mg by mouth daily      meloxicam (MOBIC) 15 mg tablet take 1 tablet by mouth once daily (Patient not taking: Reported on 1/18/2021) 30 tablet 2    pantoprazole (PROTONIX) 40 mg tablet Take 1 tablet (40 mg total) by mouth daily before breakfast 90 tablet 0     No current facility-administered medications for this visit  Current Outpatient Medications on File Prior to Visit   Medication Sig    albuterol (PROVENTIL HFA,VENTOLIN HFA) 90 mcg/act inhaler Inhale 1 puff as needed     buPROPion (WELLBUTRIN SR) 150 mg 12 hr tablet take 1 tablet by mouth twice a day    cholecalciferol (VITAMIN D3) 1,000 units tablet Take by mouth    fluticasone (FLONASE) 50 mcg/act nasal spray 1 spray into each nostril as needed     fluticasone-vilanterol (BREO ELLIPTA) 200-25 MCG/INH inhaler inhalation 1 puff by mouth once daily    Multiple Vitamins-Minerals (HM HAIR/SKIN/NAILS PO) Take by mouth    multivitamin (THERAGRAN) TABS Take 1 tablet by mouth    Nutritional Supplements (OSTEO ADVANCE PO) Take by mouth    Omega-3 Fatty Acids (FISH OIL) 1,000 mg Take 1,000 mg by mouth daily    [DISCONTINUED] omeprazole (PriLOSEC) 20 mg delayed release capsule Take 20 mg by mouth daily    [DISCONTINUED] ranitidine (ZANTAC) 150 mg tablet Take 150 mg by mouth as needed     meloxicam (MOBIC) 15 mg tablet take 1 tablet by mouth once daily (Patient not taking: Reported on 1/18/2021)     No current facility-administered medications on file prior to visit  She is allergic to penicillins       Review of Systems   Constitutional: Negative for activity change  Respiratory: Negative for chest tightness and shortness of breath  Cardiovascular: Negative for chest pain and leg swelling  Gastrointestinal: Positive for abdominal pain (epigastric)  Negative for abdominal distention, anal bleeding, blood in stool, constipation, diarrhea, nausea, rectal pain and vomiting  Dysphagia   Neurological: Negative for headaches  Psychiatric/Behavioral: Negative for dysphoric mood  The patient is not nervous/anxious            Objective:      /82 (BP Location: Left arm, Patient Position: Sitting, Cuff Size: Large)   Pulse 70   Temp 97 8 °F (36 6 °C)   Ht 5' 6" (1 676 m)   Wt 87 8 kg (193 lb 9 6 oz)   LMP 05/03/2021   SpO2 99%   BMI 31 25 kg/m²          Physical Exam  Vitals signs and nursing note reviewed  Constitutional:       General: She is not in acute distress  Appearance: Normal appearance  She is not ill-appearing, toxic-appearing or diaphoretic  HENT:      Head: Normocephalic and atraumatic  Right Ear: External ear normal       Left Ear: External ear normal    Cardiovascular:      Rate and Rhythm: Normal rate and regular rhythm  Heart sounds: No murmur  No friction rub  Pulmonary:      Effort: Pulmonary effort is normal  No respiratory distress  Breath sounds: Normal breath sounds  No stridor  No wheezing, rhonchi or rales  Abdominal:      General: There is no distension  Palpations: There is no mass  Tenderness: There is abdominal tenderness (epigastric)  There is no guarding  Musculoskeletal:         General: No swelling  Right lower leg: No edema  Left lower leg: No edema  Neurological:      General: No focal deficit present  Mental Status: She is alert  Mental status is at baseline  Psychiatric:         Attention and Perception: Attention normal          Mood and Affect: Mood normal          Speech: Speech normal          Behavior: Behavior normal          Thought Content: Thought content normal          Judgment: Judgment normal        BMI Counseling: Body mass index is 31 25 kg/m²  The BMI is above normal  Nutrition recommendations include decreasing overall calorie intake

## 2021-05-20 ENCOUNTER — OFFICE VISIT (OUTPATIENT)
Dept: GASTROENTEROLOGY | Facility: CLINIC | Age: 51
End: 2021-05-20
Payer: COMMERCIAL

## 2021-05-20 ENCOUNTER — TELEPHONE (OUTPATIENT)
Dept: GASTROENTEROLOGY | Facility: CLINIC | Age: 51
End: 2021-05-20

## 2021-05-20 ENCOUNTER — TRANSCRIBE ORDERS (OUTPATIENT)
Dept: GASTROENTEROLOGY | Facility: CLINIC | Age: 51
End: 2021-05-20

## 2021-05-20 VITALS
SYSTOLIC BLOOD PRESSURE: 122 MMHG | RESPIRATION RATE: 18 BRPM | DIASTOLIC BLOOD PRESSURE: 80 MMHG | HEIGHT: 66 IN | BODY MASS INDEX: 31.02 KG/M2 | WEIGHT: 193 LBS | HEART RATE: 76 BPM

## 2021-05-20 DIAGNOSIS — Z11.59 SPECIAL SCREENING EXAMINATION FOR UNSPECIFIED VIRAL DISEASE: Primary | ICD-10-CM

## 2021-05-20 DIAGNOSIS — Z11.59 SPECIAL SCREENING EXAMINATION FOR UNSPECIFIED VIRAL DISEASE: ICD-10-CM

## 2021-05-20 DIAGNOSIS — R13.10 DYSPHAGIA, UNSPECIFIED TYPE: ICD-10-CM

## 2021-05-20 DIAGNOSIS — K21.9 GASTROESOPHAGEAL REFLUX DISEASE WITHOUT ESOPHAGITIS: ICD-10-CM

## 2021-05-20 PROCEDURE — U0005 INFEC AGEN DETEC AMPLI PROBE: HCPCS | Performed by: INTERNAL MEDICINE

## 2021-05-20 PROCEDURE — U0003 INFECTIOUS AGENT DETECTION BY NUCLEIC ACID (DNA OR RNA); SEVERE ACUTE RESPIRATORY SYNDROME CORONAVIRUS 2 (SARS-COV-2) (CORONAVIRUS DISEASE [COVID-19]), AMPLIFIED PROBE TECHNIQUE, MAKING USE OF HIGH THROUGHPUT TECHNOLOGIES AS DESCRIBED BY CMS-2020-01-R: HCPCS | Performed by: INTERNAL MEDICINE

## 2021-05-20 PROCEDURE — 99204 OFFICE O/P NEW MOD 45 MIN: CPT | Performed by: PHYSICIAN ASSISTANT

## 2021-05-20 RX ORDER — PANTOPRAZOLE SODIUM 40 MG/1
40 TABLET, DELAYED RELEASE ORAL 2 TIMES DAILY
Qty: 60 TABLET | Refills: 1 | Status: SHIPPED | OUTPATIENT
Start: 2021-05-20 | End: 2021-09-07

## 2021-05-20 NOTE — PROGRESS NOTES
[facility] - Outpatient Consultation  Ebonie Gilbert 44 y o  female MRN: [id number]  Encounter: [id number]          ASSESSMENT AND PLAN:      1  Gastroesophageal reflux disease  2  Dysphagia    Patient presents for an evaluation of dysphagia for solids and significant reflux x 3 weeks  She has been on a daily PPI for a couple weeks without sufficient relief  She did previously have dysphagia about 5 years ago  She had an EGD in 2016 which was normal visually and empiric dilation was performed  She was also treated for h pylori  She had an esophageal manometry in 2016 as well which showed an elevated UES pressure but otherwise was normal     Will plan for EGD to investigate for esophagitis, Cannon's, strictures, rings, bx for EoE and h pylori, etc   Will increase Pantoprazole to 40mg po BID  If EGD negative and dysphagia persists despite the PPI BID, will plan for repeat esophageal manometry testing  Note: Last colonoscopy was 5 years ago and she had a polyp - she is due for repeat colonoscopy this year  We reviewed this recommendation  She wishes to schedule this later after her dysphagia has been addressed/improved  ______________________________________________________________________    HPI:  Patient is a 44year old female who presents to the office for an evaluation of dysphagia and GERD x 3 weeks  She reports she is having difficulty with solids  She reports she is only eating soft foods and liquids right now due to the symptoms  She reports significant heartburn  She tried Omeprazole OTC for 2 weeks without benefit and her PCP just changed the medication to Pantoprazole  No vomiting  No melena or rectal bleeding  She reports a long history of GERD for years which she has been controlling with dietary modifications for quite some time  She did previously have issues with dysphagia about 5 years ago    She had an EGD in 2016 which was normal visually and empiric dilation was performed  She was also treated for h pylori  She had an esophageal manometry in 2016 as well which showed an elevated UES pressure but otherwise was normal   She also had a colonoscopy in 2016 and a polyp was removed  She has a grandfather with colon cancer  She has had a cholecystectomy in the past       REVIEW OF SYSTEMS:    CONSTITUTIONAL: Denies any fever, chills, rigors, and weight loss  HEENT: No earache or tinnitus  Denies hearing loss or visual disturbances  CARDIOVASCULAR: No chest pain or palpitations  RESPIRATORY: Denies any cough, hemoptysis, shortness of breath or dyspnea on exertion  GASTROINTESTINAL: As noted in the History of Present Illness  GENITOURINARY: No problems with urination  Denies any hematuria or dysuria  NEUROLOGIC: No dizziness or vertigo, denies headaches  MUSCULOSKELETAL: Denies any muscle or joint pain  SKIN: Denies skin rashes or itching  ENDOCRINE: Denies excessive thirst  Denies intolerance to heat or cold  PSYCHOSOCIAL: Denies depression or anxiety  Denies any recent memory loss  Historical Information   Past Medical History:   Diagnosis Date    Anxiety     Asthma     Depression     GERD (gastroesophageal reflux disease)     History of transfusion 2001    Hyperlipidemia     Moderate persistent asthma     Obesity (BMI 30 0-34  9)      Past Surgical History:   Procedure Laterality Date    ABDOMINOPLASTY      CHOLECYSTECTOMY      COLONOSCOPY      [place] KNEE SCOPE,MED/LAT MENISECTOMY Right 10/8/2020    Procedure: KNEE ARTHROSCOPIC PARTIAL MEDIAL MENISCECTOMY; CHONDROPLASTY;  Surgeon: Zacarias Jonas MD;  Location: [facility];  Service: Orthopedics    WISDOM TOOTH EXTRACTION       Social History   Social History     Substance and Sexual Activity   Alcohol Use Yes    Frequency: 2-4 times a month    Drinks per session: 1 or 2    Comment: rare, social      Social History     Substance and Sexual Activity   Drug Use No     Social History     Tobacco Use   Smoking Status Light Tobacco Smoker    Packs/day: 0 25    Types: Cigarettes   Smokeless Tobacco Never Used     Family History   Problem Relation Age of Onset    Hypertension Father         benign essential    Diabetes Maternal Grandmother     Colon cancer Paternal Grandfather        Meds/Allergies       Current Outpatient Medications:     albuterol (PROVENTIL HFA,VENTOLIN HFA) 90 mcg/act inhaler    buPROPion (WELLBUTRIN SR) 150 mg 12 hr tablet    cholecalciferol (VITAMIN D3) 1,000 units tablet    fluticasone (FLONASE) 50 mcg/act nasal spray    fluticasone-vilanterol (BREO ELLIPTA) 200-25 MCG/INH inhaler    Multiple Vitamins-Minerals (HM HAIR/SKIN/NAILS PO)    multivitamin (THERAGRAN) TABS    Nutritional Supplements (OSTEO ADVANCE PO)    Omega-3 Fatty Acids (FISH OIL) 1,000 mg    pantoprazole (PROTONIX) 40 mg tablet    meloxicam (MOBIC) 15 mg tablet    Allergies   Allergen Reactions    Penicillins Shortness Of Breath           Objective     Blood pressure 122/80, pulse 76, resp  rate 18, height 5' 6" (1 676 m), weight 87 5 kg (193 lb), last menstrual period 05/03/2021  Body mass index is 31 15 kg/m²  PHYSICAL EXAM:      General Appearance:   Alert, cooperative, no distress   HEENT:   Normocephalic, atraumatic, anicteric   Neck:  Supple, symmetrical, trachea midline   Lungs:   Clear to auscultation bilaterally; no rales, rhonchi or wheezing; respirations unlabored    Heart[de-identified]   Regular rate and rhythm; no murmur, rub, or gallop  Abdomen:   Soft, non-tender, non-distended; normal bowel sounds; no masses, no organomegaly    Genitalia:   Deferred    Rectal:   Deferred    Extremities:  No cyanosis, clubbing or edema    Pulses:  2+ and symmetric    Skin:  No jaundice, rashes, or lesions    Lymph nodes:  No palpable cervical lymphadenopathy        Lab Results:   No visits with results within 1 Day(s) from this visit     Latest known visit with results is:   Orders Only on 03/25/2021   Component Date Value    SARS-CoV-2 03/25/2021 Negative          Radiology Results:   No results found

## 2021-05-20 NOTE — TELEPHONE ENCOUNTER
Aron Hearn patient - Called 5/20 @ 8 am to notify of an opening today @ 9:30 and to RTRN call to move up appt from 6/3   Thx

## 2021-05-21 LAB — SARS-COV-2 RNA RESP QL NAA+PROBE: NEGATIVE

## 2021-05-24 ENCOUNTER — LAB REQUISITION (OUTPATIENT)
Dept: LAB | Facility: HOSPITAL | Age: 51
End: 2021-05-24
Payer: COMMERCIAL

## 2021-05-24 DIAGNOSIS — R13.10 DYSPHAGIA, UNSPECIFIED: ICD-10-CM

## 2021-05-24 DIAGNOSIS — K21.9 GASTRO-ESOPHAGEAL REFLUX DISEASE WITHOUT ESOPHAGITIS: ICD-10-CM

## 2021-05-24 PROCEDURE — 88305 TISSUE EXAM BY PATHOLOGIST: CPT | Performed by: PATHOLOGY

## 2021-06-17 ENCOUNTER — OFFICE VISIT (OUTPATIENT)
Dept: GASTROENTEROLOGY | Facility: CLINIC | Age: 51
End: 2021-06-17
Payer: COMMERCIAL

## 2021-06-17 VITALS
HEIGHT: 66 IN | WEIGHT: 192 LBS | SYSTOLIC BLOOD PRESSURE: 122 MMHG | HEART RATE: 84 BPM | BODY MASS INDEX: 30.86 KG/M2 | DIASTOLIC BLOOD PRESSURE: 82 MMHG

## 2021-06-17 DIAGNOSIS — K22.2 ESOPHAGEAL STRICTURE: ICD-10-CM

## 2021-06-17 DIAGNOSIS — K44.9 HIATAL HERNIA: ICD-10-CM

## 2021-06-17 DIAGNOSIS — K21.9 GASTROESOPHAGEAL REFLUX DISEASE, UNSPECIFIED WHETHER ESOPHAGITIS PRESENT: Primary | ICD-10-CM

## 2021-06-17 PROCEDURE — 99213 OFFICE O/P EST LOW 20 MIN: CPT | Performed by: PHYSICIAN ASSISTANT

## 2021-06-17 PROCEDURE — 3008F BODY MASS INDEX DOCD: CPT | Performed by: PHYSICIAN ASSISTANT

## 2021-06-17 RX ORDER — CYCLOSPORINE 0.5 MG/ML
EMULSION OPHTHALMIC
COMMUNITY
Start: 2021-05-19 | End: 2022-02-07

## 2021-06-17 NOTE — PROGRESS NOTES
[facility] - Outpatient Follow-up Note  Ashanti Browning 44 y o  female MRN: [id number]  Encounter: [id number]          ASSESSMENT AND PLAN:      1  Gastroesophageal reflux disease  2  Esophageal stricture  3  Hiatal hernia    EGD showed an esophageal stricture that was dilated and a small hiatal hernia; biopsies were benign  She is on Pantoprazole 40mg po BID  Her dysphagia has resolved  She also previously required an EGD with dilation about 5 years ago  Continue Pantoprazole 40mg po BID for 1 additional month and then will decrease to once daily  GERD modifications were reviewed  GERD information sheet provided to patient  She also had her last colonoscopy in the fall of 2016 and is due for repeat colonoscopy this fall which she will schedule     ______________________________________________________________________    SUBJECTIVE:  Patient is a 44year old female who presents to the office for follow up  EGD showed an esophageal stricture that was dilated and a small hiatal hernia  She is feeling much better  No further dysphagia  She is taking the Pantoprazole BID  No nausea or vomiting  No abdominal pain  No problems with her bowel movements  No blood in the stool  Her last colonoscopy was in November of 2016 and a polyp was removed  She has a grandfather with colon cancer  REVIEW OF SYSTEMS IS OTHERWISE NEGATIVE  Historical Information   Past Medical History:   Diagnosis Date    Anxiety     Asthma     Depression     GERD (gastroesophageal reflux disease)     History of transfusion 2001    Hyperlipidemia     Moderate persistent asthma     Obesity (BMI 30 0-34  9)      Past Surgical History:   Procedure Laterality Date    ABDOMINOPLASTY      CHOLECYSTECTOMY      COLONOSCOPY      [place] KNEE SCOPE,MED/LAT MENISECTOMY Right 10/8/2020    Procedure: KNEE ARTHROSCOPIC PARTIAL MEDIAL MENISCECTOMY; CHONDROPLASTY;  Surgeon: Kiran Quigley MD;  [facility];  Service: Orthopedics    WISDOM TOOTH EXTRACTION       Social History   Social History     Substance and Sexual Activity   Alcohol Use Yes    Comment: rare, social      Social History     Substance and Sexual Activity   Drug Use No     Social History     Tobacco Use   Smoking Status Light Tobacco Smoker    Packs/day: 0 25    Types: Cigarettes   Smokeless Tobacco Never Used     Family History   Problem Relation Age of Onset    Hypertension Father         benign essential    Diabetes Maternal Grandmother     Colon cancer Paternal Grandfather        Meds/Allergies       Current Outpatient Medications:     albuterol (PROVENTIL HFA,VENTOLIN HFA) 90 mcg/act inhaler    buPROPion (WELLBUTRIN SR) 150 mg 12 hr tablet    cholecalciferol (VITAMIN D3) 1,000 units tablet    fluticasone (FLONASE) 50 mcg/act nasal spray    fluticasone-vilanterol (BREO ELLIPTA) 200-25 MCG/INH inhaler    meloxicam (MOBIC) 15 mg tablet    Multiple Vitamins-Minerals (HM HAIR/SKIN/NAILS PO)    multivitamin (THERAGRAN) TABS    Nutritional Supplements (OSTEO ADVANCE PO)    Omega-3 Fatty Acids (FISH OIL) 1,000 mg    pantoprazole (PROTONIX) 40 mg tablet    Restasis 0 05 % ophthalmic emulsion    Allergies   Allergen Reactions    Penicillins Shortness Of Breath           Objective     Blood pressure 122/82, pulse 84, height 5' 6" (1 676 m), weight 87 1 kg (192 lb)  Body mass index is 30 99 kg/m²  PHYSICAL EXAM:      General Appearance:   Alert, cooperative, no distress   HEENT:   Normocephalic, atraumatic, anicteric     Neck:  Supple, symmetrical, trachea midline   Lungs:   Clear to auscultation bilaterally; no rales, rhonchi or wheezing; respirations unlabored    Heart[de-identified]   Regular rate and rhythm; no murmur, rub, or gallop     Abdomen:   Soft, non-tender, non-distended; normal bowel sounds; no masses, no organomegaly    Genitalia:   Deferred    Rectal:   Deferred    Extremities:  No cyanosis, clubbing or edema    Pulses:  2+ and symmetric    Skin:  No jaundice, rashes, or lesions    Lymph nodes:  No palpable cervical lymphadenopathy        Lab Results:   No visits with results within 1 Day(s) from this visit  Latest known visit with results is:   Lab Requisition on 05/24/2021   Component Date Value    Case Report 05/24/2021                      Value:Surgical Pathology Report                         Case: [id number]                                   Authorizing Provider:  Rocael Gutierrez MD      Collected:           05/24/2021 [id number]              Ordering Location:     [address] [place] [address]      Received:            05/24/2021 [address] [place] [address] Specialty                                                                                  Laboratory                                                                   Pathologist:           Todd Birmingham MD                                                                Specimen:    Esophagus, esophagus @ 29                                                                  Final Diagnosis 05/24/2021                      Value: This result contains rich text formatting which cannot be displayed here   Additional Information 05/24/2021                      Value: This result contains rich text formatting which cannot be displayed here  Amarjit Barroso Gross Description 05/24/2021                      Value: This result contains rich text formatting which cannot be displayed here  Radiology Results:   No results found

## 2021-07-30 ENCOUNTER — APPOINTMENT (OUTPATIENT)
Dept: LAB | Facility: HOSPITAL | Age: 51
End: 2021-07-30
Payer: COMMERCIAL

## 2021-07-30 DIAGNOSIS — N95.0 POSTMENOPAUSAL BLEEDING: Primary | ICD-10-CM

## 2021-07-30 LAB
BASOPHILS # BLD AUTO: 0.06 THOUSANDS/ΜL (ref 0–0.1)
BASOPHILS NFR BLD AUTO: 1 % (ref 0–1)
EOSINOPHIL # BLD AUTO: 0.36 THOUSAND/ΜL (ref 0–0.61)
EOSINOPHIL NFR BLD AUTO: 7 % (ref 0–6)
ERYTHROCYTE [DISTWIDTH] IN BLOOD BY AUTOMATED COUNT: 12.6 % (ref 11.6–15.1)
ESTRADIOL SERPL-MCNC: 71 PG/ML
FSH SERPL-ACNC: 21.6 MIU/ML
HCT VFR BLD AUTO: 42.2 % (ref 34.8–46.1)
HGB BLD-MCNC: 14.4 G/DL (ref 11.5–15.4)
IMM GRANULOCYTES # BLD AUTO: 0.01 THOUSAND/UL (ref 0–0.2)
IMM GRANULOCYTES NFR BLD AUTO: 0 % (ref 0–2)
LYMPHOCYTES # BLD AUTO: 1.87 THOUSANDS/ΜL (ref 0.6–4.47)
LYMPHOCYTES NFR BLD AUTO: 35 % (ref 14–44)
MCH RBC QN AUTO: 31.6 PG (ref 26.8–34.3)
MCHC RBC AUTO-ENTMCNC: 34.1 G/DL (ref 31.4–37.4)
MCV RBC AUTO: 93 FL (ref 82–98)
MONOCYTES # BLD AUTO: 0.36 THOUSAND/ΜL (ref 0.17–1.22)
MONOCYTES NFR BLD AUTO: 7 % (ref 4–12)
NEUTROPHILS # BLD AUTO: 2.76 THOUSANDS/ΜL (ref 1.85–7.62)
NEUTS SEG NFR BLD AUTO: 50 % (ref 43–75)
NRBC BLD AUTO-RTO: 0 /100 WBCS
PLATELET # BLD AUTO: 227 THOUSANDS/UL (ref 149–390)
PMV BLD AUTO: 10 FL (ref 8.9–12.7)
RBC # BLD AUTO: 4.55 MILLION/UL (ref 3.81–5.12)
T4 FREE SERPL-MCNC: 0.85 NG/DL (ref 0.76–1.46)
TSH SERPL DL<=0.05 MIU/L-ACNC: 3.28 UIU/ML (ref 0.36–3.74)
WBC # BLD AUTO: 5.42 THOUSAND/UL (ref 4.31–10.16)

## 2021-07-30 PROCEDURE — 85025 COMPLETE CBC W/AUTO DIFF WBC: CPT

## 2021-07-30 PROCEDURE — 36415 COLL VENOUS BLD VENIPUNCTURE: CPT

## 2021-07-30 PROCEDURE — 83001 ASSAY OF GONADOTROPIN (FSH): CPT

## 2021-07-30 PROCEDURE — 84443 ASSAY THYROID STIM HORMONE: CPT

## 2021-07-30 PROCEDURE — 82670 ASSAY OF TOTAL ESTRADIOL: CPT

## 2021-07-30 PROCEDURE — 84439 ASSAY OF FREE THYROXINE: CPT

## 2021-08-04 ENCOUNTER — APPOINTMENT (OUTPATIENT)
Dept: LAB | Facility: CLINIC | Age: 51
End: 2021-08-04
Payer: COMMERCIAL

## 2021-08-04 ENCOUNTER — TELEPHONE (OUTPATIENT)
Dept: GASTROENTEROLOGY | Facility: CLINIC | Age: 51
End: 2021-08-04

## 2021-08-04 DIAGNOSIS — R63.5 ABNORMAL WEIGHT GAIN: ICD-10-CM

## 2021-08-04 DIAGNOSIS — N83.209 CYST OF OVARY, UNSPECIFIED LATERALITY: ICD-10-CM

## 2021-08-04 LAB
ANION GAP SERPL CALCULATED.3IONS-SCNC: 8 MMOL/L (ref 4–13)
BUN SERPL-MCNC: 14 MG/DL (ref 5–25)
CALCIUM SERPL-MCNC: 9.1 MG/DL (ref 8.3–10.1)
CANCER AG125 SERPL-ACNC: 8.6 U/ML (ref 0–30)
CHLORIDE SERPL-SCNC: 106 MMOL/L (ref 100–108)
CO2 SERPL-SCNC: 24 MMOL/L (ref 21–32)
CREAT SERPL-MCNC: 0.74 MG/DL (ref 0.6–1.3)
GFR SERPL CREATININE-BSD FRML MDRD: 93 ML/MIN/1.73SQ M
GLUCOSE SERPL-MCNC: 99 MG/DL (ref 65–140)
MAGNESIUM SERPL-MCNC: 2.2 MG/DL (ref 1.6–2.6)
POTASSIUM SERPL-SCNC: 3.6 MMOL/L (ref 3.5–5.3)
SODIUM SERPL-SCNC: 138 MMOL/L (ref 136–145)

## 2021-08-04 PROCEDURE — 80048 BASIC METABOLIC PNL TOTAL CA: CPT

## 2021-08-04 PROCEDURE — 83735 ASSAY OF MAGNESIUM: CPT

## 2021-08-04 PROCEDURE — 36415 COLL VENOUS BLD VENIPUNCTURE: CPT

## 2021-08-04 PROCEDURE — 86304 IMMUNOASSAY TUMOR CA 125: CPT

## 2021-08-11 ENCOUNTER — OFFICE VISIT (OUTPATIENT)
Dept: OBGYN CLINIC | Facility: CLINIC | Age: 51
End: 2021-08-11
Payer: COMMERCIAL

## 2021-08-11 VITALS
SYSTOLIC BLOOD PRESSURE: 122 MMHG | BODY MASS INDEX: 32.81 KG/M2 | DIASTOLIC BLOOD PRESSURE: 72 MMHG | WEIGHT: 192.2 LBS | HEIGHT: 64 IN

## 2021-08-11 DIAGNOSIS — N92.1 MENORRHAGIA WITH IRREGULAR CYCLE: Primary | ICD-10-CM

## 2021-08-11 PROCEDURE — 99203 OFFICE O/P NEW LOW 30 MIN: CPT | Performed by: OBSTETRICS & GYNECOLOGY

## 2021-08-11 PROCEDURE — 88305 TISSUE EXAM BY PATHOLOGIST: CPT | Performed by: PATHOLOGY

## 2021-08-12 PROCEDURE — 58100 BIOPSY OF UTERUS LINING: CPT | Performed by: OBSTETRICS & GYNECOLOGY

## 2021-08-12 NOTE — PROGRESS NOTES
Gynecology   Randall Regan 44 y o  female MRN: [id number]    Assessment/Plan     Assessment:  Menorrhagia with irregular cycle, recurrent    Plan:  Await EMB pathology  If benign will proceed with TLH/BSO/cystoscopy  Discussed options of expectant management, Mirena IUD, Novasure ablation - all declined by patient  Consents signed  Discussed risks of bleeding, infection, and trauma to surrounding organs as well as conversion to open procedure  HPI:  Randall Regan is a 44 y o  female who presents with recurrent episodes of heavy irregular menstrual bleeding  Has had two D&C's - benign pathology; last done 2020  [facility] and estradiol 2021 showed perimenopause  Historical Information   Past Medical History:   Diagnosis Date    Anxiety     Asthma     Depression     GERD (gastroesophageal reflux disease)     History of transfusion     Hyperlipidemia     Moderate persistent asthma     Obesity (BMI 30 0-34  9)      Past Surgical History:   Procedure Laterality Date    ABDOMINOPLASTY      BREAST BIOPSY Right     CHOLECYSTECTOMY      COLONOSCOPY      [place] KNEE SCOPE,MED/LAT MENISECTOMY Right 10/8/2020    Procedure: KNEE ARTHROSCOPIC PARTIAL MEDIAL MENISCECTOMY; CHONDROPLASTY;  Surgeon: Cheyenne Acosta MD;  [facility]: [facility];  Service: Orthopedics    WISDOM TOOTH EXTRACTION       OB/GYN History:   OB History        0    Para   0    Term   0       0    AB   0    Living   0       SAB   0    TAB   0    Ectopic   0    Multiple   0    Live Births   0                 Family History   Problem Relation Age of Onset    Osteoporosis Mother     Hypertension Father         benign essential    Diabetes Maternal Grandmother     Diabetes Maternal Grandfather     Colon cancer Paternal Grandfather      Social History   Social History     Substance and Sexual Activity   Alcohol Use Yes    Comment: rare, social      Social History     Substance and Sexual Activity   Drug Use No Social History     Tobacco Use   Smoking Status Light Tobacco Smoker    Packs/day: 0 25    Types: Cigarettes   Smokeless Tobacco Never Used     E-Cigarette/Vaping    E-Cigarette Use Never User      E-Cigarette/Vaping Substances    Nicotine No     THC No     CBD No     Flavoring No     Other No     Unknown No        Meds/Allergies   Current Outpatient Medications on File Prior to Visit   Medication Sig    albuterol (PROVENTIL HFA,VENTOLIN HFA) 90 mcg/act inhaler Inhale 1 puff as needed     buPROPion (WELLBUTRIN SR) 150 mg 12 hr tablet take 1 tablet by mouth twice a day    cholecalciferol (VITAMIN D3) 1,000 units tablet Take by mouth    fluticasone (FLONASE) 50 mcg/act nasal spray 1 spray into each nostril as needed     fluticasone-vilanterol (BREO ELLIPTA) 200-25 MCG/INH inhaler inhalation 1 puff by mouth once daily    Multiple Vitamins-Minerals (HM HAIR/SKIN/NAILS PO) Take by mouth    multivitamin (THERAGRAN) TABS Take 1 tablet by mouth    Nutritional Supplements (OSTEO ADVANCE PO) Take by mouth    Omega-3 Fatty Acids (FISH OIL) 1,000 mg Take 1,000 mg by mouth daily    pantoprazole (PROTONIX) 40 mg tablet Take 1 tablet (40 mg total) by mouth 2 (two) times a day    Restasis 0 05 % ophthalmic emulsion Instill 1 drop into both eyes twice a day    meloxicam (MOBIC) 15 mg tablet take 1 tablet by mouth once daily (Patient not taking: Reported on 8/11/2021)     No current facility-administered medications on file prior to visit  Allergies   Allergen Reactions    Penicillins Shortness Of Breath and Anaphylaxis     Review of Systems   Constitutional: Negative for chills, fever, malaise/fatigue and weight loss  Respiratory: Negative for cough, shortness of breath, sputum production and wheezing  Gastrointestinal: Negative for bloating, abdominal pain, change in bowel habit and nausea  Genitourinary: Positive for menorrhagia  Negative for frequency, pelvic pain and urgency  Objective   Vitals: Blood pressure 122/72, height 5' 4 25" (1 632 m), weight 87 2 kg (192 lb 3 2 oz), last menstrual period 07/15/2021  Physical Exam  Constitutional:       Appearance: Normal appearance  Genitourinary:      Vulva and urethra normal       Cervical bleeding present  HENT:      Head: Normocephalic  Cardiovascular:      Rate and Rhythm: Normal rate and regular rhythm  Pulmonary:      Effort: Pulmonary effort is normal    Abdominal:      Palpations: Abdomen is soft  Tenderness: There is no abdominal tenderness  Musculoskeletal:         General: No swelling  Neurological:      General: No focal deficit present  Mental Status: She is alert and oriented to person, place, and time  Skin:     General: Skin is warm and dry  Psychiatric:         Mood and Affect: Mood normal          Behavior: Behavior normal    Vitals reviewed  Endometrial biopsy    Date/Time: 8/12/2021 11:59 AM  Performed by: Alka Mcgregor MD  Authorized by: Alka Mcgregor MD   Universal Protocol:  Consent: Verbal consent obtained  Written consent obtained  Risks and benefits: risks, benefits and alternatives were discussed  Consent given by: patient  Time out: Immediately prior to procedure a "time out" was called to verify the correct patient, procedure, equipment, support staff and site/side marked as required  Patient understanding: patient states understanding of the procedure being performed  Patient consent: the patient's understanding of the procedure matches consent given  Patient identity confirmed: verbally with patient      Indication:     Indications:  Other disorder of menstruation and other abnormal bleeding from female genital tract    Procedure:     Procedure: endometrial biopsy with Pipelle      A bivalve speculum was placed in the vagina: yes      Cervix cleaned and prepped: yes      The cervix was dilated: no      Uterus sounded: yes      Uterus sound depth (cm):  7 Specimen collected: specimen collected and sent to pathology      Patient tolerated procedure well with no complications: yes    Findings:     Uterus size:  Non-gravid

## 2021-08-18 ENCOUNTER — TELEPHONE (OUTPATIENT)
Dept: ADMINISTRATIVE | Facility: OTHER | Age: 51
End: 2021-08-18

## 2021-08-18 ENCOUNTER — TELEPHONE (OUTPATIENT)
Dept: OBGYN CLINIC | Facility: CLINIC | Age: 51
End: 2021-08-18

## 2021-08-18 NOTE — TELEPHONE ENCOUNTER
----- Message from Jamie Cotto MA sent at 8/17/2021  1:57 PM EDT -----  Regarding: Care Gap Request  08/17/21 1:57 PM    Hello, our patient Zaira Jones has had Mammogram completed/performed  Please assist in updating the patient chart by pulling the Care Everywhere (CE) document  The date of service is 8/16/2021       Thank you,  Jamie Cotto MA   JOHAN BARGER

## 2021-08-18 NOTE — TELEPHONE ENCOUNTER
Fabiana Murrell MD   8/18/2021  6:46 AM EDT       Notify benign and go forward with scheduling surgery

## 2021-08-18 NOTE — TELEPHONE ENCOUNTER
Pt contacted and informed as directed  Pt informed the surgery scheduler will receive her information and will reach out 24-48 hour turn around time  Pt agreed to plan of action

## 2021-08-18 NOTE — TELEPHONE ENCOUNTER
Pt called to find out results of biopsy, and then talk about wha'ts the next step for the hysterectomy

## 2021-08-19 NOTE — TELEPHONE ENCOUNTER
Upon review of the In Basket request we were able to locate, review, and update the patient chart as requested for Mammogram     Any additional questions or concerns should be emailed to the Practice Liaisons via [email or web address] email, please do not reply via In Basket      Thank you  Pai Almendarez

## 2021-08-20 ENCOUNTER — LAB REQUISITION (OUTPATIENT)
Dept: LAB | Facility: HOSPITAL | Age: 51
End: 2021-08-20
Payer: COMMERCIAL

## 2021-08-20 ENCOUNTER — APPOINTMENT (OUTPATIENT)
Dept: LAB | Facility: HOSPITAL | Age: 51
End: 2021-08-20
Attending: OBSTETRICS & GYNECOLOGY
Payer: COMMERCIAL

## 2021-08-20 DIAGNOSIS — Z01.818 ENCOUNTER FOR OTHER PREPROCEDURAL EXAMINATION: ICD-10-CM

## 2021-08-20 DIAGNOSIS — Z01.818 PRE-OP TESTING: Primary | ICD-10-CM

## 2021-08-20 LAB
ABO GROUP BLD: NORMAL
BLD GP AB SCN SERPL QL: NEGATIVE
RH BLD: NEGATIVE
SPECIMEN EXPIRATION DATE: NORMAL

## 2021-08-20 PROCEDURE — 86900 BLOOD TYPING SEROLOGIC ABO: CPT | Performed by: OBSTETRICS & GYNECOLOGY

## 2021-08-20 PROCEDURE — 86850 RBC ANTIBODY SCREEN: CPT | Performed by: OBSTETRICS & GYNECOLOGY

## 2021-08-20 PROCEDURE — 86901 BLOOD TYPING SEROLOGIC RH(D): CPT | Performed by: OBSTETRICS & GYNECOLOGY

## 2021-08-26 ENCOUNTER — APPOINTMENT (OUTPATIENT)
Dept: LAB | Facility: CLINIC | Age: 51
End: 2021-08-26
Payer: COMMERCIAL

## 2021-08-26 PROCEDURE — 87086 URINE CULTURE/COLONY COUNT: CPT

## 2021-08-28 LAB — BACTERIA UR CULT: ABNORMAL

## 2021-09-03 ENCOUNTER — TELEPHONE (OUTPATIENT)
Dept: ANESTHESIOLOGY | Facility: CLINIC | Age: 51
End: 2021-09-03

## 2021-09-06 DIAGNOSIS — R13.10 DYSPHAGIA, UNSPECIFIED TYPE: ICD-10-CM

## 2021-09-06 DIAGNOSIS — F32.A ANXIETY AND DEPRESSION: ICD-10-CM

## 2021-09-06 DIAGNOSIS — F41.9 ANXIETY AND DEPRESSION: ICD-10-CM

## 2021-09-06 DIAGNOSIS — K21.9 GASTROESOPHAGEAL REFLUX DISEASE WITHOUT ESOPHAGITIS: ICD-10-CM

## 2021-09-06 DIAGNOSIS — E66.09 CLASS 1 OBESITY DUE TO EXCESS CALORIES WITH SERIOUS COMORBIDITY AND BODY MASS INDEX (BMI) OF 30.0 TO 30.9 IN ADULT: ICD-10-CM

## 2021-09-07 RX ORDER — BUPROPION HYDROCHLORIDE 150 MG/1
TABLET, EXTENDED RELEASE ORAL
Qty: 60 TABLET | Refills: 1 | Status: SHIPPED | OUTPATIENT
Start: 2021-09-07 | End: 2021-11-01

## 2021-09-07 RX ORDER — PANTOPRAZOLE SODIUM 40 MG/1
TABLET, DELAYED RELEASE ORAL
Qty: 60 TABLET | Refills: 1 | Status: SHIPPED | OUTPATIENT
Start: 2021-09-07 | End: 2021-11-01

## 2021-09-09 ENCOUNTER — TELEPHONE (OUTPATIENT)
Dept: OBGYN CLINIC | Facility: CLINIC | Age: 51
End: 2021-09-09

## 2021-09-09 ENCOUNTER — TELEMEDICINE (OUTPATIENT)
Dept: ANESTHESIOLOGY | Facility: CLINIC | Age: 51
End: 2021-09-09
Payer: COMMERCIAL

## 2021-09-09 VITALS — BODY MASS INDEX: 32.02 KG/M2 | WEIGHT: 188 LBS

## 2021-09-09 DIAGNOSIS — T65.291A TOXIC EFFECT OF TOBACCO, ACCIDENTAL OR UNINTENTIONAL, INITIAL ENCOUNTER: ICD-10-CM

## 2021-09-09 DIAGNOSIS — R82.79 POSITIVE URINE CULTURE: ICD-10-CM

## 2021-09-09 DIAGNOSIS — F17.210 CIGARETTE NICOTINE DEPENDENCE WITHOUT COMPLICATION: Primary | ICD-10-CM

## 2021-09-09 PROBLEM — F17.219 CIGARETTE NICOTINE DEPENDENCE WITH NICOTINE-INDUCED DISORDER: Status: ACTIVE | Noted: 2021-09-09

## 2021-09-09 PROCEDURE — 99204 OFFICE O/P NEW MOD 45 MIN: CPT | Performed by: NURSE PRACTITIONER

## 2021-09-09 NOTE — ASSESSMENT & PLAN NOTE
Urine Culture 70,000-79,000 cfu/ml Lactobacillus speciesAbnormal         Patient is currently waiting to hear from surgeon about treatment for urine prior to surgery  Patient is currently asymptomatic denies fever chills, dysuria, frequency  Message was sent to ordering provider
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
## 2021-09-28 ENCOUNTER — TELEPHONE (OUTPATIENT)
Dept: OBGYN CLINIC | Facility: CLINIC | Age: 51
End: 2021-09-28

## 2021-09-28 NOTE — TELEPHONE ENCOUNTER
Pt having surgery on 10/6 is asking about the pain medication, is asking for them to be sent ahead of time so she has time to pick them up

## 2021-10-05 ENCOUNTER — ANESTHESIA EVENT (OUTPATIENT)
Dept: PERIOP | Facility: HOSPITAL | Age: 51
End: 2021-10-05
Payer: COMMERCIAL

## 2021-10-05 ENCOUNTER — TELEPHONE (OUTPATIENT)
Dept: OBGYN CLINIC | Facility: CLINIC | Age: 51
End: 2021-10-05

## 2021-10-05 DIAGNOSIS — G89.18 POSTOPERATIVE PAIN: Primary | ICD-10-CM

## 2021-10-05 RX ORDER — OXYCODONE HYDROCHLORIDE 5 MG/1
5 TABLET ORAL EVERY 4 HOURS PRN
Qty: 10 TABLET | Refills: 0 | Status: SHIPPED | OUTPATIENT
Start: 2021-10-05 | End: 2022-02-07

## 2021-10-06 ENCOUNTER — HOSPITAL ENCOUNTER (OUTPATIENT)
Facility: HOSPITAL | Age: 51
Setting detail: OUTPATIENT SURGERY
Discharge: HOME | End: 2021-10-07
Attending: OBSTETRICS & GYNECOLOGY | Admitting: OBSTETRICS & GYNECOLOGY
Payer: COMMERCIAL

## 2021-10-06 ENCOUNTER — ANESTHESIA (OUTPATIENT)
Dept: PERIOP | Facility: HOSPITAL | Age: 51
End: 2021-10-06
Payer: COMMERCIAL

## 2021-10-06 DIAGNOSIS — N92.1 MENORRHAGIA WITH IRREGULAR CYCLE: ICD-10-CM

## 2021-10-06 DIAGNOSIS — T65.291A TOXIC EFFECT OF TOBACCO AND NICOTINE, ACCIDENTAL OR UNINTENTIONAL, INITIAL ENCOUNTER: Primary | ICD-10-CM

## 2021-10-06 PROBLEM — Z90.710 S/P LAPAROSCOPIC HYSTERECTOMY: Status: ACTIVE | Noted: 2021-10-06

## 2021-10-06 LAB
ABO GROUP BLD: NORMAL
BLD GP AB SCN SERPL QL: NEGATIVE
EXT PREGNANCY TEST URINE: NEGATIVE
EXT. CONTROL: NORMAL
GLUCOSE SERPL-MCNC: 109 MG/DL (ref 65–140)
RH BLD: NEGATIVE
SPECIMEN EXPIRATION DATE: NORMAL

## 2021-10-06 PROCEDURE — 81025 URINE PREGNANCY TEST: CPT | Performed by: OBSTETRICS & GYNECOLOGY

## 2021-10-06 PROCEDURE — 88307 TISSUE EXAM BY PATHOLOGIST: CPT | Performed by: PATHOLOGY

## 2021-10-06 PROCEDURE — 58571 TLH W/T/O 250 G OR LESS: CPT | Performed by: OBSTETRICS & GYNECOLOGY

## 2021-10-06 PROCEDURE — 86850 RBC ANTIBODY SCREEN: CPT | Performed by: OBSTETRICS & GYNECOLOGY

## 2021-10-06 PROCEDURE — 86900 BLOOD TYPING SEROLOGIC ABO: CPT | Performed by: OBSTETRICS & GYNECOLOGY

## 2021-10-06 PROCEDURE — NC001 PR NO CHARGE: Performed by: OBSTETRICS & GYNECOLOGY

## 2021-10-06 PROCEDURE — 82948 REAGENT STRIP/BLOOD GLUCOSE: CPT

## 2021-10-06 PROCEDURE — 86901 BLOOD TYPING SEROLOGIC RH(D): CPT | Performed by: OBSTETRICS & GYNECOLOGY

## 2021-10-06 RX ORDER — LIDOCAINE HYDROCHLORIDE 10 MG/ML
0.5 INJECTION, SOLUTION EPIDURAL; INFILTRATION; INTRACAUDAL; PERINEURAL ONCE AS NEEDED
Status: COMPLETED | OUTPATIENT
Start: 2021-10-06 | End: 2021-10-06

## 2021-10-06 RX ORDER — ACETAMINOPHEN 325 MG/1
650 TABLET ORAL EVERY 4 HOURS PRN
Status: DISCONTINUED | OUTPATIENT
Start: 2021-10-06 | End: 2021-10-07 | Stop reason: HOSPADM

## 2021-10-06 RX ORDER — FLUTICASONE PROPIONATE 50 MCG
1 SPRAY, SUSPENSION (ML) NASAL DAILY
Status: DISCONTINUED | OUTPATIENT
Start: 2021-10-07 | End: 2021-10-07 | Stop reason: HOSPADM

## 2021-10-06 RX ORDER — CLINDAMYCIN PHOSPHATE 900 MG/50ML
900 INJECTION INTRAVENOUS ONCE
Status: DISCONTINUED | OUTPATIENT
Start: 2021-10-06 | End: 2021-10-06 | Stop reason: HOSPADM

## 2021-10-06 RX ORDER — SODIUM CHLORIDE 9 MG/ML
INJECTION, SOLUTION INTRAVENOUS CONTINUOUS PRN
Status: DISCONTINUED | OUTPATIENT
Start: 2021-10-06 | End: 2021-10-06

## 2021-10-06 RX ORDER — OXYCODONE HYDROCHLORIDE 10 MG/1
10 TABLET ORAL EVERY 4 HOURS PRN
Status: DISCONTINUED | OUTPATIENT
Start: 2021-10-06 | End: 2021-10-07 | Stop reason: HOSPADM

## 2021-10-06 RX ORDER — CLINDAMYCIN PHOSPHATE 900 MG/50ML
900 INJECTION INTRAVENOUS ONCE
Status: DISCONTINUED | OUTPATIENT
Start: 2021-10-06 | End: 2021-10-06

## 2021-10-06 RX ORDER — FENTANYL CITRATE 50 UG/ML
INJECTION, SOLUTION INTRAMUSCULAR; INTRAVENOUS AS NEEDED
Status: DISCONTINUED | OUTPATIENT
Start: 2021-10-06 | End: 2021-10-06

## 2021-10-06 RX ORDER — HYDROMORPHONE HCL/PF 1 MG/ML
0.5 SYRINGE (ML) INJECTION
Status: COMPLETED | OUTPATIENT
Start: 2021-10-06 | End: 2021-10-06

## 2021-10-06 RX ORDER — CLINDAMYCIN PHOSPHATE 900 MG/50ML
INJECTION INTRAVENOUS AS NEEDED
Status: DISCONTINUED | OUTPATIENT
Start: 2021-10-06 | End: 2021-10-06

## 2021-10-06 RX ORDER — MIDAZOLAM HYDROCHLORIDE 2 MG/2ML
INJECTION, SOLUTION INTRAMUSCULAR; INTRAVENOUS AS NEEDED
Status: DISCONTINUED | OUTPATIENT
Start: 2021-10-06 | End: 2021-10-06

## 2021-10-06 RX ORDER — KETOROLAC TROMETHAMINE 30 MG/ML
15 INJECTION, SOLUTION INTRAMUSCULAR; INTRAVENOUS ONCE
Status: COMPLETED | OUTPATIENT
Start: 2021-10-06 | End: 2021-10-06

## 2021-10-06 RX ORDER — SODIUM CHLORIDE, SODIUM LACTATE, POTASSIUM CHLORIDE, CALCIUM CHLORIDE 600; 310; 30; 20 MG/100ML; MG/100ML; MG/100ML; MG/100ML
75 INJECTION, SOLUTION INTRAVENOUS CONTINUOUS
Status: DISCONTINUED | OUTPATIENT
Start: 2021-10-06 | End: 2021-10-07 | Stop reason: HOSPADM

## 2021-10-06 RX ORDER — ONDANSETRON 2 MG/ML
4 INJECTION INTRAMUSCULAR; INTRAVENOUS ONCE AS NEEDED
Status: COMPLETED | OUTPATIENT
Start: 2021-10-06 | End: 2021-10-06

## 2021-10-06 RX ORDER — SODIUM CHLORIDE, SODIUM LACTATE, POTASSIUM CHLORIDE, CALCIUM CHLORIDE 600; 310; 30; 20 MG/100ML; MG/100ML; MG/100ML; MG/100ML
INJECTION, SOLUTION INTRAVENOUS CONTINUOUS PRN
Status: DISCONTINUED | OUTPATIENT
Start: 2021-10-06 | End: 2021-10-06

## 2021-10-06 RX ORDER — DOCUSATE SODIUM 100 MG/1
100 CAPSULE, LIQUID FILLED ORAL 2 TIMES DAILY
Status: DISCONTINUED | OUTPATIENT
Start: 2021-10-06 | End: 2021-10-07 | Stop reason: HOSPADM

## 2021-10-06 RX ORDER — HYDROMORPHONE HCL/PF 1 MG/ML
1 SYRINGE (ML) INJECTION ONCE
Status: COMPLETED | OUTPATIENT
Start: 2021-10-06 | End: 2021-10-06

## 2021-10-06 RX ORDER — OXYCODONE HCL 5 MG/5 ML
10 SOLUTION, ORAL ORAL EVERY 4 HOURS PRN
Status: DISCONTINUED | OUTPATIENT
Start: 2021-10-06 | End: 2021-10-06 | Stop reason: SDUPTHER

## 2021-10-06 RX ORDER — OXYCODONE HYDROCHLORIDE 5 MG/1
5 TABLET ORAL EVERY 4 HOURS PRN
Status: DISCONTINUED | OUTPATIENT
Start: 2021-10-06 | End: 2021-10-07 | Stop reason: HOSPADM

## 2021-10-06 RX ORDER — PROPOFOL 10 MG/ML
INJECTION, EMULSION INTRAVENOUS AS NEEDED
Status: DISCONTINUED | OUTPATIENT
Start: 2021-10-06 | End: 2021-10-06

## 2021-10-06 RX ORDER — ACETAMINOPHEN 325 MG/1
650 TABLET ORAL EVERY 6 HOURS PRN
Status: DISCONTINUED | OUTPATIENT
Start: 2021-10-06 | End: 2021-10-06 | Stop reason: SDUPTHER

## 2021-10-06 RX ORDER — IBUPROFEN 600 MG/1
600 TABLET ORAL EVERY 6 HOURS PRN
Status: DISCONTINUED | OUTPATIENT
Start: 2021-10-06 | End: 2021-10-07 | Stop reason: HOSPADM

## 2021-10-06 RX ORDER — FENTANYL CITRATE/PF 50 MCG/ML
50 SYRINGE (ML) INJECTION
Status: DISCONTINUED | OUTPATIENT
Start: 2021-10-06 | End: 2021-10-06 | Stop reason: HOSPADM

## 2021-10-06 RX ORDER — KETOROLAC TROMETHAMINE 30 MG/ML
INJECTION, SOLUTION INTRAMUSCULAR; INTRAVENOUS AS NEEDED
Status: DISCONTINUED | OUTPATIENT
Start: 2021-10-06 | End: 2021-10-06

## 2021-10-06 RX ORDER — OXYCODONE HYDROCHLORIDE 5 MG/1
5 TABLET ORAL EVERY 4 HOURS PRN
Status: DISCONTINUED | OUTPATIENT
Start: 2021-10-06 | End: 2021-10-06 | Stop reason: SDUPTHER

## 2021-10-06 RX ORDER — BUPROPION HYDROCHLORIDE 150 MG/1
300 TABLET ORAL DAILY
Status: DISCONTINUED | OUTPATIENT
Start: 2021-10-07 | End: 2021-10-07 | Stop reason: HOSPADM

## 2021-10-06 RX ORDER — SODIUM CHLORIDE, SODIUM LACTATE, POTASSIUM CHLORIDE, CALCIUM CHLORIDE 600; 310; 30; 20 MG/100ML; MG/100ML; MG/100ML; MG/100ML
125 INJECTION, SOLUTION INTRAVENOUS CONTINUOUS
Status: DISCONTINUED | OUTPATIENT
Start: 2021-10-06 | End: 2021-10-07 | Stop reason: HOSPADM

## 2021-10-06 RX ORDER — EPHEDRINE SULFATE 50 MG/ML
INJECTION INTRAVENOUS AS NEEDED
Status: DISCONTINUED | OUTPATIENT
Start: 2021-10-06 | End: 2021-10-06

## 2021-10-06 RX ORDER — BUPROPION HYDROCHLORIDE 150 MG/1
150 TABLET ORAL DAILY
Refills: 1 | Status: DISCONTINUED | OUTPATIENT
Start: 2021-10-07 | End: 2021-10-06

## 2021-10-06 RX ORDER — ONDANSETRON 2 MG/ML
4 INJECTION INTRAMUSCULAR; INTRAVENOUS EVERY 6 HOURS PRN
Status: CANCELLED | OUTPATIENT
Start: 2021-10-06

## 2021-10-06 RX ORDER — ROCURONIUM BROMIDE 10 MG/ML
INJECTION, SOLUTION INTRAVENOUS AS NEEDED
Status: DISCONTINUED | OUTPATIENT
Start: 2021-10-06 | End: 2021-10-06

## 2021-10-06 RX ORDER — PROMETHAZINE HYDROCHLORIDE 25 MG/ML
25 INJECTION, SOLUTION INTRAMUSCULAR; INTRAVENOUS ONCE
Status: COMPLETED | OUTPATIENT
Start: 2021-10-06 | End: 2021-10-06

## 2021-10-06 RX ORDER — ONDANSETRON 2 MG/ML
INJECTION INTRAMUSCULAR; INTRAVENOUS AS NEEDED
Status: DISCONTINUED | OUTPATIENT
Start: 2021-10-06 | End: 2021-10-06

## 2021-10-06 RX ORDER — BUPIVACAINE HYDROCHLORIDE 5 MG/ML
INJECTION, SOLUTION PERINEURAL AS NEEDED
Status: DISCONTINUED | OUTPATIENT
Start: 2021-10-06 | End: 2021-10-06 | Stop reason: HOSPADM

## 2021-10-06 RX ORDER — DEXAMETHASONE SODIUM PHOSPHATE 10 MG/ML
INJECTION, SOLUTION INTRAMUSCULAR; INTRAVENOUS AS NEEDED
Status: DISCONTINUED | OUTPATIENT
Start: 2021-10-06 | End: 2021-10-06

## 2021-10-06 RX ORDER — LIDOCAINE HYDROCHLORIDE 10 MG/ML
INJECTION, SOLUTION EPIDURAL; INFILTRATION; INTRACAUDAL; PERINEURAL AS NEEDED
Status: DISCONTINUED | OUTPATIENT
Start: 2021-10-06 | End: 2021-10-06

## 2021-10-06 RX ORDER — ONDANSETRON 2 MG/ML
4 INJECTION INTRAMUSCULAR; INTRAVENOUS EVERY 6 HOURS PRN
Status: DISCONTINUED | OUTPATIENT
Start: 2021-10-06 | End: 2021-10-07 | Stop reason: HOSPADM

## 2021-10-06 RX ORDER — ALBUTEROL SULFATE 90 UG/1
2 AEROSOL, METERED RESPIRATORY (INHALATION) EVERY 4 HOURS PRN
Status: DISCONTINUED | OUTPATIENT
Start: 2021-10-06 | End: 2021-10-07 | Stop reason: HOSPADM

## 2021-10-06 RX ADMIN — Medication 50 MCG: at 16:24

## 2021-10-06 RX ADMIN — CLINDAMYCIN PHOSPHATE 900 MG: 900 INJECTION, SOLUTION INTRAVENOUS at 13:45

## 2021-10-06 RX ADMIN — FENTANYL CITRATE 25 MCG: 50 INJECTION INTRAMUSCULAR; INTRAVENOUS at 15:36

## 2021-10-06 RX ADMIN — PROMETHAZINE HYDROCHLORIDE 25 MG: 25 INJECTION INTRAMUSCULAR; INTRAVENOUS at 17:52

## 2021-10-06 RX ADMIN — DEXAMETHASONE SODIUM PHOSPHATE 10 MG: 10 INJECTION, SOLUTION INTRAMUSCULAR; INTRAVENOUS at 13:57

## 2021-10-06 RX ADMIN — SODIUM CHLORIDE, SODIUM LACTATE, POTASSIUM CHLORIDE, AND CALCIUM CHLORIDE: .6; .31; .03; .02 INJECTION, SOLUTION INTRAVENOUS at 13:31

## 2021-10-06 RX ADMIN — PHENYLEPHRINE HYDROCHLORIDE 30 MCG/MIN: 10 INJECTION INTRAVENOUS at 14:19

## 2021-10-06 RX ADMIN — ROCURONIUM BROMIDE 15 MG: 50 INJECTION, SOLUTION INTRAVENOUS at 14:41

## 2021-10-06 RX ADMIN — FENTANYL CITRATE 25 MCG: 50 INJECTION INTRAMUSCULAR; INTRAVENOUS at 15:24

## 2021-10-06 RX ADMIN — LIDOCAINE HYDROCHLORIDE 50 MG: 10 INJECTION, SOLUTION EPIDURAL; INFILTRATION; INTRACAUDAL; PERINEURAL at 13:38

## 2021-10-06 RX ADMIN — FENTANYL CITRATE 50 MCG: 50 INJECTION INTRAMUSCULAR; INTRAVENOUS at 13:38

## 2021-10-06 RX ADMIN — ROCURONIUM BROMIDE 10 MG: 50 INJECTION, SOLUTION INTRAVENOUS at 14:11

## 2021-10-06 RX ADMIN — DOCUSATE SODIUM 100 MG: 100 CAPSULE, LIQUID FILLED ORAL at 20:23

## 2021-10-06 RX ADMIN — IBUPROFEN 600 MG: 600 TABLET, FILM COATED ORAL at 20:23

## 2021-10-06 RX ADMIN — MIDAZOLAM 2 MG: 1 INJECTION INTRAMUSCULAR; INTRAVENOUS at 13:30

## 2021-10-06 RX ADMIN — HYDROMORPHONE HYDROCHLORIDE 0.5 MG: 1 INJECTION, SOLUTION INTRAMUSCULAR; INTRAVENOUS; SUBCUTANEOUS at 16:48

## 2021-10-06 RX ADMIN — SODIUM CHLORIDE, SODIUM LACTATE, POTASSIUM CHLORIDE, AND CALCIUM CHLORIDE: .6; .31; .03; .02 INJECTION, SOLUTION INTRAVENOUS at 14:55

## 2021-10-06 RX ADMIN — FENTANYL CITRATE 50 MCG: 50 INJECTION INTRAMUSCULAR; INTRAVENOUS at 14:45

## 2021-10-06 RX ADMIN — SUGAMMADEX 200 MG: 100 INJECTION, SOLUTION INTRAVENOUS at 15:22

## 2021-10-06 RX ADMIN — KETOROLAC TROMETHAMINE 15 MG: 30 INJECTION, SOLUTION INTRAMUSCULAR; INTRAVENOUS at 17:26

## 2021-10-06 RX ADMIN — SODIUM CHLORIDE, SODIUM LACTATE, POTASSIUM CHLORIDE, AND CALCIUM CHLORIDE 125 ML/HR: .6; .31; .03; .02 INJECTION, SOLUTION INTRAVENOUS at 12:54

## 2021-10-06 RX ADMIN — PROPOFOL 170 MG: 10 INJECTION, EMULSION INTRAVENOUS at 13:38

## 2021-10-06 RX ADMIN — ONDANSETRON 4 MG: 2 INJECTION INTRAMUSCULAR; INTRAVENOUS at 16:23

## 2021-10-06 RX ADMIN — FENTANYL CITRATE 50 MCG: 50 INJECTION INTRAMUSCULAR; INTRAVENOUS at 13:52

## 2021-10-06 RX ADMIN — HYDROMORPHONE HYDROCHLORIDE 1 MG: 1 INJECTION, SOLUTION INTRAMUSCULAR; INTRAVENOUS; SUBCUTANEOUS at 17:20

## 2021-10-06 RX ADMIN — ONDANSETRON 4 MG: 2 INJECTION INTRAMUSCULAR; INTRAVENOUS at 15:07

## 2021-10-06 RX ADMIN — EPHEDRINE SULFATE 10 MG: 50 INJECTION, SOLUTION INTRAVENOUS at 14:16

## 2021-10-06 RX ADMIN — EPHEDRINE SULFATE 10 MG: 50 INJECTION, SOLUTION INTRAVENOUS at 14:52

## 2021-10-06 RX ADMIN — EPHEDRINE SULFATE 10 MG: 50 INJECTION, SOLUTION INTRAVENOUS at 14:01

## 2021-10-06 RX ADMIN — HYDROMORPHONE HYDROCHLORIDE 0.5 MG: 1 INJECTION, SOLUTION INTRAMUSCULAR; INTRAVENOUS; SUBCUTANEOUS at 16:27

## 2021-10-06 RX ADMIN — LIDOCAINE HYDROCHLORIDE 0.5 ML: 10 INJECTION, SOLUTION EPIDURAL; INFILTRATION; INTRACAUDAL; PERINEURAL at 12:54

## 2021-10-06 RX ADMIN — KETOROLAC TROMETHAMINE 15 MG: 30 INJECTION, SOLUTION INTRAMUSCULAR at 15:20

## 2021-10-06 RX ADMIN — HYDROMORPHONE HYDROCHLORIDE 0.5 MG: 1 INJECTION, SOLUTION INTRAMUSCULAR; INTRAVENOUS; SUBCUTANEOUS at 16:38

## 2021-10-06 RX ADMIN — SODIUM CHLORIDE: 0.9 INJECTION, SOLUTION INTRAVENOUS at 13:44

## 2021-10-06 RX ADMIN — ROCURONIUM BROMIDE 50 MG: 50 INJECTION, SOLUTION INTRAVENOUS at 13:38

## 2021-10-06 RX ADMIN — Medication 50 MCG: at 16:13

## 2021-10-07 VITALS
OXYGEN SATURATION: 94 % | RESPIRATION RATE: 15 BRPM | BODY MASS INDEX: 30.22 KG/M2 | WEIGHT: 188 LBS | SYSTOLIC BLOOD PRESSURE: 108 MMHG | HEIGHT: 66 IN | DIASTOLIC BLOOD PRESSURE: 66 MMHG | TEMPERATURE: 98.3 F | HEART RATE: 77 BPM

## 2021-10-07 PROCEDURE — 99024 POSTOP FOLLOW-UP VISIT: CPT | Performed by: OBSTETRICS & GYNECOLOGY

## 2021-10-07 RX ADMIN — OXYCODONE HYDROCHLORIDE 10 MG: 10 TABLET ORAL at 00:15

## 2021-10-07 RX ADMIN — OXYCODONE HYDROCHLORIDE 10 MG: 10 TABLET ORAL at 12:50

## 2021-10-07 RX ADMIN — BUPROPION HYDROCHLORIDE 300 MG: 150 TABLET, FILM COATED, EXTENDED RELEASE ORAL at 08:45

## 2021-10-07 RX ADMIN — FLUTICASONE PROPIONATE 1 SPRAY: 50 SPRAY, METERED NASAL at 08:46

## 2021-10-07 RX ADMIN — OXYCODONE HYDROCHLORIDE 10 MG: 10 TABLET ORAL at 08:45

## 2021-10-07 RX ADMIN — DOCUSATE SODIUM 100 MG: 100 CAPSULE, LIQUID FILLED ORAL at 08:45

## 2021-10-08 ENCOUNTER — TELEPHONE (OUTPATIENT)
Dept: OBGYN CLINIC | Facility: CLINIC | Age: 51
End: 2021-10-08

## 2021-10-08 DIAGNOSIS — G89.18 POSTOPERATIVE PAIN: Primary | ICD-10-CM

## 2021-10-08 RX ORDER — OXYCODONE AND ACETAMINOPHEN 10; 325 MG/1; MG/1
1 TABLET ORAL EVERY 4 HOURS PRN
Qty: 20 TABLET | Refills: 0 | Status: SHIPPED | OUTPATIENT
Start: 2021-10-08 | End: 2022-02-07

## 2021-10-13 ENCOUNTER — TELEPHONE (OUTPATIENT)
Dept: OBGYN CLINIC | Facility: CLINIC | Age: 51
End: 2021-10-13

## 2021-11-01 DIAGNOSIS — E66.09 CLASS 1 OBESITY DUE TO EXCESS CALORIES WITH SERIOUS COMORBIDITY AND BODY MASS INDEX (BMI) OF 30.0 TO 30.9 IN ADULT: ICD-10-CM

## 2021-11-01 DIAGNOSIS — F32.A ANXIETY AND DEPRESSION: ICD-10-CM

## 2021-11-01 DIAGNOSIS — K21.9 GASTROESOPHAGEAL REFLUX DISEASE WITHOUT ESOPHAGITIS: ICD-10-CM

## 2021-11-01 DIAGNOSIS — F41.9 ANXIETY AND DEPRESSION: ICD-10-CM

## 2021-11-01 DIAGNOSIS — R13.10 DYSPHAGIA, UNSPECIFIED TYPE: ICD-10-CM

## 2021-11-01 RX ORDER — BUPROPION HYDROCHLORIDE 150 MG/1
150 TABLET, EXTENDED RELEASE ORAL 2 TIMES DAILY
Qty: 60 TABLET | Refills: 2 | Status: SHIPPED | OUTPATIENT
Start: 2021-11-01 | End: 2022-03-29

## 2021-11-01 RX ORDER — BUPROPION HYDROCHLORIDE 150 MG/1
150 TABLET, EXTENDED RELEASE ORAL DAILY
Qty: 60 TABLET | Refills: 2 | Status: SHIPPED | OUTPATIENT
Start: 2021-11-01 | End: 2021-11-01 | Stop reason: SDUPTHER

## 2021-11-01 RX ORDER — PANTOPRAZOLE SODIUM 40 MG/1
TABLET, DELAYED RELEASE ORAL
Qty: 60 TABLET | Refills: 1 | Status: SHIPPED | OUTPATIENT
Start: 2021-11-01 | End: 2022-01-04

## 2021-11-22 ENCOUNTER — OFFICE VISIT (OUTPATIENT)
Dept: OBGYN CLINIC | Facility: CLINIC | Age: 51
End: 2021-11-22

## 2021-11-22 VITALS — SYSTOLIC BLOOD PRESSURE: 110 MMHG | WEIGHT: 190.4 LBS | BODY MASS INDEX: 30.73 KG/M2 | DIASTOLIC BLOOD PRESSURE: 62 MMHG

## 2021-11-22 DIAGNOSIS — Z90.710 S/P LAPAROSCOPIC HYSTERECTOMY: Primary | ICD-10-CM

## 2021-11-22 PROCEDURE — 99024 POSTOP FOLLOW-UP VISIT: CPT | Performed by: OBSTETRICS & GYNECOLOGY

## 2021-11-23 PROBLEM — N92.4 PERIMENOPAUSAL MENORRHAGIA: Status: RESOLVED | Noted: 2018-05-02 | Resolved: 2021-11-23

## 2021-12-31 DIAGNOSIS — R13.10 DYSPHAGIA, UNSPECIFIED TYPE: ICD-10-CM

## 2021-12-31 DIAGNOSIS — K21.9 GASTROESOPHAGEAL REFLUX DISEASE WITHOUT ESOPHAGITIS: ICD-10-CM

## 2022-01-04 RX ORDER — PANTOPRAZOLE SODIUM 40 MG/1
TABLET, DELAYED RELEASE ORAL
Qty: 60 TABLET | Refills: 1 | Status: SHIPPED | OUTPATIENT
Start: 2022-01-04 | End: 2022-01-11 | Stop reason: SDUPTHER

## 2022-01-11 DIAGNOSIS — R13.10 DYSPHAGIA, UNSPECIFIED TYPE: ICD-10-CM

## 2022-01-11 DIAGNOSIS — K21.9 GASTROESOPHAGEAL REFLUX DISEASE WITHOUT ESOPHAGITIS: ICD-10-CM

## 2022-01-11 RX ORDER — PANTOPRAZOLE SODIUM 40 MG/1
40 TABLET, DELAYED RELEASE ORAL 2 TIMES DAILY
Qty: 60 TABLET | Refills: 11 | Status: SHIPPED | OUTPATIENT
Start: 2022-01-11

## 2022-02-07 ENCOUNTER — OFFICE VISIT (OUTPATIENT)
Dept: FAMILY MEDICINE CLINIC | Facility: CLINIC | Age: 52
End: 2022-02-07
Payer: COMMERCIAL

## 2022-02-07 VITALS
DIASTOLIC BLOOD PRESSURE: 92 MMHG | WEIGHT: 192 LBS | OXYGEN SATURATION: 98 % | HEART RATE: 79 BPM | TEMPERATURE: 99 F | BODY MASS INDEX: 30.86 KG/M2 | HEIGHT: 66 IN | SYSTOLIC BLOOD PRESSURE: 138 MMHG

## 2022-02-07 DIAGNOSIS — R03.0 ELEVATED BLOOD PRESSURE READING IN OFFICE WITHOUT DIAGNOSIS OF HYPERTENSION: ICD-10-CM

## 2022-02-07 DIAGNOSIS — R51.9 FRONTAL HEADACHE: ICD-10-CM

## 2022-02-07 DIAGNOSIS — R42 VERTIGO: Primary | ICD-10-CM

## 2022-02-07 PROCEDURE — 99214 OFFICE O/P EST MOD 30 MIN: CPT | Performed by: PHYSICIAN ASSISTANT

## 2022-02-07 RX ORDER — MECLIZINE HYDROCHLORIDE 25 MG/1
25 TABLET ORAL EVERY 8 HOURS PRN
Qty: 30 TABLET | Refills: 0 | Status: SHIPPED | OUTPATIENT
Start: 2022-02-07 | End: 2022-02-18

## 2022-02-07 NOTE — PROGRESS NOTES
Assessment/Plan:       Problem List Items Addressed This Visit     None      Visit Diagnoses     Vertigo    -  Primary    Relevant Medications    meclizine (ANTIVERT) 25 mg tablet    Elevated blood pressure reading in office without diagnosis of hypertension        Frontal headache            + simón hallpike  Benign neuro exam  Likely vertigo  Trial mecilizine  Also, her BP is borderline and is normally much lower for her  Possibly the cause of her headaches? Monitor at home, call if persistently elevated over the next few days  Recommend exedrin products, fluids, rest  ER and return precautions discussed  Subjective:      Patient ID: Lauro Wynn is a 46 y o  female  Pt presents with concerns of headaches x 10 days and dizziness x 1 week  No hx of migraine  No head/neck injuries  Feels like the room is spinning at times  Worse with head motions  No weakness, numbness, facial drooping, trouble speaking/swallowing, syncope  No CP, SOB  Headaches are frontal  No URI sx  Denies congesiton, sinus pressure, cough, sneezing  She does have an appt with her eye doctor today as well  No acute vision changes  The following portions of the patient's history were reviewed and updated as appropriate:   She  has a past medical history of Anxiety, Arthritis, Asthma, Bleeding, Depression, GERD (gastroesophageal reflux disease), History of transfusion (2001), Hyperlipidemia, Moderate persistent asthma, Obesity (BMI 30 0-34 9), and Osteoarthritis    She   Patient Active Problem List    Diagnosis Date Noted    S/P laparoscopic hysterectomy 10/06/2021    Cigarette nicotine dependence with nicotine-induced disorder 09/09/2021    Cigarette nicotine dependence without complication 03/22/0468    Positive urine culture 09/09/2021    Encounter for observation for suspected exposure to other biological agents ruled out 03/25/2021    Right foot pain 01/18/2021    Smoking 10/08/2020    Complex tear of medial meniscus of right knee as current injury 09/01/2020    Familial hypercholesterolemia 07/02/2020    Chronic arthralgias of knees and hips 07/02/2020    Class 1 obesity due to excess calories with serious comorbidity and body mass index (BMI) of 30 0 to 30 9 in adult 07/02/2020    Anxiety 09/10/2019    Smoking trying to quit 07/02/2019    Grief at loss of child 06/18/2019    Psychophysiological insomnia 06/18/2019    Annual physical exam 06/18/2019    Carpal tunnel syndrome of right wrist 02/06/2018    Osteoarthritis of spine with radiculopathy, cervical region 02/06/2018    Overweight (BMI 25 0-29 9) 04/04/2017    Elevated cholesterol 04/07/2016    Degenerative cervical disc 02/17/2016    Seasonal allergic rhinitis 02/17/2016    Moderate persistent asthma 01/14/2014     She  has a past surgical history that includes Abdominoplasty; Cholecystectomy; Colonoscopy; [place] tooth extraction; pr knee scope,med/lat menisectomy (Right, 10/8/2020); Breast biopsy (Right, 2003); EGD; Dilation and curettage, diagnostic / therapeutic; Esophageal dilation; Skin graft; Hysterectomy (Bilateral, 10/6/2021); and CYSTOSCOPY (N/A, 10/6/2021)  Her family history includes Colon cancer in her paternal grandfather; Diabetes in her maternal grandfather and maternal grandmother; Hypertension in her father; Osteoporosis in her mother  She  reports that she has been smoking cigarettes  She has been smoking about 0 25 packs per day  She has never used smokeless tobacco  She reports current alcohol use of about 2 0 standard drinks of alcohol per week  She reports that she does not use drugs    Current Outpatient Medications   Medication Sig Dispense Refill    albuterol (PROVENTIL HFA,VENTOLIN HFA) 90 mcg/act inhaler Inhale 1 puff as needed       Black Cohosh (REMIFEMIN MENOPAUSE PO) Remifemin Menopause      buPROPion (WELLBUTRIN SR) 150 mg 12 hr tablet Take 1 tablet (150 mg total) by mouth 2 (two) times a day 60 tablet 2    cholecalciferol (VITAMIN D3) 1,000 units tablet Take 1,000 Units by mouth daily       Flovent  MCG/ACT inhaler inhale 2 puffs by mouth and INTO THE LUNGS twice a day Rinse mouth after use 12 g 2    fluticasone (FLONASE) 50 mcg/act nasal spray 1 spray into each nostril as needed       fluticasone-vilanterol (BREO ELLIPTA) 200-25 MCG/INH inhaler inhalation 1 puff by mouth once daily      pantoprazole (PROTONIX) 40 mg tablet Take 1 tablet (40 mg total) by mouth 2 (two) times a day 60 tablet 11    meclizine (ANTIVERT) 25 mg tablet Take 1 tablet (25 mg total) by mouth every 8 (eight) hours as needed for dizziness 30 tablet 0     No current facility-administered medications for this visit  Current Outpatient Medications on File Prior to Visit   Medication Sig    albuterol (PROVENTIL HFA,VENTOLIN HFA) 90 mcg/act inhaler Inhale 1 puff as needed     Black Cohosh (REMIFEMIN MENOPAUSE PO) Remifemin Menopause    buPROPion (WELLBUTRIN SR) 150 mg 12 hr tablet Take 1 tablet (150 mg total) by mouth 2 (two) times a day    cholecalciferol (VITAMIN D3) 1,000 units tablet Take 1,000 Units by mouth daily     Flovent  MCG/ACT inhaler inhale 2 puffs by mouth and INTO THE LUNGS twice a day Rinse mouth after use    fluticasone (FLONASE) 50 mcg/act nasal spray 1 spray into each nostril as needed     fluticasone-vilanterol (BREO ELLIPTA) 200-25 MCG/INH inhaler inhalation 1 puff by mouth once daily    pantoprazole (PROTONIX) 40 mg tablet Take 1 tablet (40 mg total) by mouth 2 (two) times a day    [DISCONTINUED] budesonide-formoterol (Symbicort) 160-4 5 mcg/act inhaler Inhale 2 puffs 2 (two) times a day Rinse mouth after use      [DISCONTINUED] Multiple Vitamins-Minerals (HM HAIR/SKIN/NAILS PO) Take by mouth    [DISCONTINUED] multivitamin (THERAGRAN) TABS Take 1 tablet by mouth    [DISCONTINUED] nicotine (NICOTROL) 10 MG inhaler Inhale 1 puff as needed for smoking cessation    [DISCONTINUED] Nutritional Supplements (OSTEO ADVANCE PO) Take by mouth    [DISCONTINUED] Omega-3 Fatty Acids (FISH OIL) 1,000 mg Take 1,000 mg by mouth daily    [DISCONTINUED] oxyCODONE (Roxicodone) 5 immediate release tablet Take 1 tablet (5 mg total) by mouth every 4 (four) hours as needed for moderate painMax Daily Amount: 30 mg    [DISCONTINUED] oxyCODONE-acetaminophen (Percocet)  mg per tablet Take 1 tablet by mouth every 4 (four) hours as needed for moderate painMax Daily Amount: 6 tablets    [DISCONTINUED] predniSONE 20 mg tablet Take 1 tablet (20 mg total) by mouth daily    [DISCONTINUED] predniSONE 20 mg tablet Take 1 tablet (20 mg total) by mouth 2 (two) times a day with meals To Keep    [DISCONTINUED] Restasis 0 05 % ophthalmic emulsion Instill 1 drop into both eyes twice a day     No current facility-administered medications on file prior to visit  She is allergic to penicillins and coconut oil - food allergy       Review of Systems   Constitutional: Negative for chills, fatigue and fever  HENT: Negative for congestion, ear pain, hearing loss, nosebleeds, postnasal drip, rhinorrhea, sinus pressure, sinus pain, sneezing and sore throat  Eyes: Negative for pain, discharge, itching and visual disturbance  Respiratory: Negative for cough, chest tightness, shortness of breath and wheezing  Cardiovascular: Negative for chest pain, palpitations and leg swelling  Gastrointestinal: Negative for abdominal pain, blood in stool, constipation, diarrhea, nausea and vomiting  Genitourinary: Negative for frequency and urgency  Musculoskeletal: Negative  Skin: Negative  Neurological: Positive for dizziness and headaches  Negative for tremors, seizures, syncope, facial asymmetry, speech difficulty, weakness, light-headedness and numbness           Objective:      /92 (BP Location: Left arm, Patient Position: Sitting, Cuff Size: Large)   Pulse 79   Temp 99 °F (37 2 °C)   Ht 5' 6" (1 676 m)   Wt 87 1 kg (192 lb)   SpO2 98%   BMI 30 99 kg/m²          Physical Exam  Vitals and nursing note reviewed  Constitutional:       General: She is not in acute distress  Appearance: Normal appearance  HENT:      Head: Normocephalic and atraumatic  Right Ear: Tympanic membrane, ear canal and external ear normal  There is no impacted cerumen  Left Ear: Tympanic membrane, ear canal and external ear normal  There is no impacted cerumen  Nose: Nose normal       Mouth/Throat:      Mouth: Mucous membranes are moist       Pharynx: Oropharynx is clear  No oropharyngeal exudate or posterior oropharyngeal erythema  Eyes:      Pupils: Pupils are equal, round, and reactive to light  Cardiovascular:      Rate and Rhythm: Normal rate and regular rhythm  Pulses: Normal pulses  Heart sounds: Normal heart sounds  No murmur heard  Pulmonary:      Effort: Pulmonary effort is normal  No respiratory distress  Breath sounds: Normal breath sounds  No wheezing, rhonchi or rales  Abdominal:      General: Bowel sounds are normal       Palpations: Abdomen is soft  Musculoskeletal:         General: Normal range of motion  Cervical back: Normal range of motion and neck supple  Lymphadenopathy:      Cervical: No cervical adenopathy  Skin:     General: Skin is warm and dry  Neurological:      General: No focal deficit present  Mental Status: She is alert and oriented to person, place, and time  Mental status is at baseline  Cranial Nerves: Cranial nerves are intact  No cranial nerve deficit  Sensory: Sensation is intact  No sensory deficit  Motor: No weakness, atrophy or seizure activity  Coordination: Coordination is intact  Coordination normal       Gait: Gait is intact   Gait normal       Comments: Dizziness is reproducible with simón hallpike   Psychiatric:         Mood and Affect: Mood and affect normal

## 2022-02-07 NOTE — LETTER
February 7, 2022     Patient: Trang Bahena   YOB: 1970   Date of Visit: 2/7/2022       To Whom it May Concern:    Trang Bahena is under my professional care  She was seen in my office on 2/7/2022  Please excuse Charlene Fisher on 2/7 and 2/8/2022  If you have any questions or concerns, please don't hesitate to call           Sincerely,          Kyle Palma PA-C        CC: No Recipients

## 2022-02-08 ENCOUNTER — TELEPHONE (OUTPATIENT)
Dept: FAMILY MEDICINE CLINIC | Facility: CLINIC | Age: 52
End: 2022-02-08

## 2022-02-08 DIAGNOSIS — E78.01 FAMILIAL HYPERCHOLESTEROLEMIA: ICD-10-CM

## 2022-02-08 DIAGNOSIS — I10 PRIMARY HYPERTENSION: Primary | ICD-10-CM

## 2022-02-08 RX ORDER — HYDROCHLOROTHIAZIDE 25 MG/1
25 TABLET ORAL DAILY
Qty: 30 TABLET | Refills: 0 | Status: SHIPPED | OUTPATIENT
Start: 2022-02-08

## 2022-02-08 NOTE — TELEPHONE ENCOUNTER
Pt calls the office, her BP reading was 143/92 this AM  She is still feeling dizzy  Please advise, thanks!

## 2022-02-10 ENCOUNTER — APPOINTMENT (OUTPATIENT)
Dept: LAB | Facility: CLINIC | Age: 52
End: 2022-02-10
Payer: COMMERCIAL

## 2022-02-10 ENCOUNTER — TELEPHONE (OUTPATIENT)
Dept: FAMILY MEDICINE CLINIC | Facility: CLINIC | Age: 52
End: 2022-02-10

## 2022-02-10 DIAGNOSIS — E78.01 FAMILIAL HYPERCHOLESTEROLEMIA: ICD-10-CM

## 2022-02-10 DIAGNOSIS — I10 PRIMARY HYPERTENSION: ICD-10-CM

## 2022-02-10 LAB
ANION GAP SERPL CALCULATED.3IONS-SCNC: 4 MMOL/L (ref 4–13)
BUN SERPL-MCNC: 22 MG/DL (ref 5–25)
CALCIUM SERPL-MCNC: 9.9 MG/DL (ref 8.3–10.1)
CHLORIDE SERPL-SCNC: 103 MMOL/L (ref 100–108)
CHOLEST SERPL-MCNC: 283 MG/DL
CO2 SERPL-SCNC: 30 MMOL/L (ref 21–32)
CREAT SERPL-MCNC: 1.06 MG/DL (ref 0.6–1.3)
GFR SERPL CREATININE-BSD FRML MDRD: 60 ML/MIN/1.73SQ M
GLUCOSE P FAST SERPL-MCNC: 93 MG/DL (ref 65–99)
HDLC SERPL-MCNC: 69 MG/DL
LDLC SERPL CALC-MCNC: 188 MG/DL (ref 0–100)
POTASSIUM SERPL-SCNC: 4.4 MMOL/L (ref 3.5–5.3)
SODIUM SERPL-SCNC: 137 MMOL/L (ref 136–145)
TRIGL SERPL-MCNC: 132 MG/DL

## 2022-02-10 PROCEDURE — 36415 COLL VENOUS BLD VENIPUNCTURE: CPT

## 2022-02-10 PROCEDURE — 80048 BASIC METABOLIC PNL TOTAL CA: CPT

## 2022-02-10 PROCEDURE — 80061 LIPID PANEL: CPT

## 2022-02-10 NOTE — TELEPHONE ENCOUNTER
Patient called back to see if you rec'd labs results  She also requested a note to return to work on 2/14  She has been out of work since 2/7    She still feels dizzy and doesn't think she can drive to [facility]

## 2022-02-11 ENCOUNTER — OFFICE VISIT (OUTPATIENT)
Dept: FAMILY MEDICINE CLINIC | Facility: CLINIC | Age: 52
End: 2022-02-11
Payer: COMMERCIAL

## 2022-02-11 ENCOUNTER — TELEPHONE (OUTPATIENT)
Dept: GASTROENTEROLOGY | Facility: CLINIC | Age: 52
End: 2022-02-11

## 2022-02-11 VITALS
SYSTOLIC BLOOD PRESSURE: 134 MMHG | TEMPERATURE: 98.7 F | WEIGHT: 188 LBS | DIASTOLIC BLOOD PRESSURE: 94 MMHG | BODY MASS INDEX: 30.22 KG/M2 | HEIGHT: 66 IN | HEART RATE: 79 BPM | OXYGEN SATURATION: 97 %

## 2022-02-11 DIAGNOSIS — R79.89 ELEVATED SERUM CREATININE: ICD-10-CM

## 2022-02-11 DIAGNOSIS — E78.00 PURE HYPERCHOLESTEROLEMIA: ICD-10-CM

## 2022-02-11 DIAGNOSIS — I10 PRIMARY HYPERTENSION: ICD-10-CM

## 2022-02-11 DIAGNOSIS — R42 VERTIGO: Primary | ICD-10-CM

## 2022-02-11 PROBLEM — Z72.0 SMOKING TRYING TO QUIT: Status: RESOLVED | Noted: 2019-07-02 | Resolved: 2022-02-11

## 2022-02-11 PROBLEM — N95.0 POSTMENOPAUSAL BLEEDING: Status: ACTIVE | Noted: 2021-07-28

## 2022-02-11 PROBLEM — F17.219 CIGARETTE NICOTINE DEPENDENCE WITH NICOTINE-INDUCED DISORDER: Status: RESOLVED | Noted: 2021-09-09 | Resolved: 2022-02-11

## 2022-02-11 PROBLEM — Z00.00 ANNUAL PHYSICAL EXAM: Status: RESOLVED | Noted: 2019-06-18 | Resolved: 2022-02-11

## 2022-02-11 PROBLEM — E78.01 FAMILIAL HYPERCHOLESTEROLEMIA: Status: RESOLVED | Noted: 2020-07-02 | Resolved: 2022-02-11

## 2022-02-11 PROBLEM — IMO0001 SMOKING: Status: RESOLVED | Noted: 2020-10-08 | Resolved: 2022-02-11

## 2022-02-11 PROBLEM — M79.671 RIGHT FOOT PAIN: Status: RESOLVED | Noted: 2021-01-18 | Resolved: 2022-02-11

## 2022-02-11 PROBLEM — F17.200 SMOKING: Status: RESOLVED | Noted: 2020-10-08 | Resolved: 2022-02-11

## 2022-02-11 PROBLEM — F17.210 CIGARETTE NICOTINE DEPENDENCE WITHOUT COMPLICATION: Status: RESOLVED | Noted: 2021-09-09 | Resolved: 2022-02-11

## 2022-02-11 PROBLEM — R82.79 POSITIVE URINE CULTURE: Status: RESOLVED | Noted: 2021-09-09 | Resolved: 2022-02-11

## 2022-02-11 PROBLEM — Z03.818 ENCOUNTER FOR OBSERVATION FOR SUSPECTED EXPOSURE TO OTHER BIOLOGICAL AGENTS RULED OUT: Status: RESOLVED | Noted: 2021-03-25 | Resolved: 2022-02-11

## 2022-02-11 PROCEDURE — 99214 OFFICE O/P EST MOD 30 MIN: CPT | Performed by: FAMILY MEDICINE
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
## 2022-02-11 NOTE — TELEPHONE ENCOUNTER
Scheduled date of colonoscopy (as of today):3/3  Physician performing colonoscopy: Andre Robb  Location of colonoscopy:[place]  Bowel prep reviewed with patient:MIRALAX  Instructions reviewed with patient by:YUDI  Clearances:

## 2022-02-11 NOTE — LETTER
February 11, 2022     Patient: Guilherme Steward   YOB: 1970   Date of Visit: 2/11/2022       To Whom it May Concern:    Guilherme Steward is under my professional care  She was seen in my office on 2/11/2022  Please allow her to work from home until re-evaluation on 02/18/2022  If you have any questions or concerns, please don't hesitate to call           Sincerely,          Yobany Guerra DO        CC: No Recipients

## 2022-02-14 ENCOUNTER — EVALUATION (OUTPATIENT)
Dept: PHYSICAL THERAPY | Facility: CLINIC | Age: 52
End: 2022-02-14
Payer: COMMERCIAL

## 2022-02-14 DIAGNOSIS — E78.00 PURE HYPERCHOLESTEROLEMIA: Primary | ICD-10-CM

## 2022-02-14 DIAGNOSIS — R42 VERTIGO: Primary | ICD-10-CM

## 2022-02-14 PROCEDURE — 97112 NEUROMUSCULAR REEDUCATION: CPT | Performed by: PHYSICAL THERAPIST

## 2022-02-14 PROCEDURE — 97161 PT EVAL LOW COMPLEX 20 MIN: CPT | Performed by: PHYSICAL THERAPIST

## 2022-02-14 NOTE — PROGRESS NOTES
PT Evaluation     Today's date: 2022  Patient name: Terence Mesa  : 1970  MRN: [id number]  Referring provider: Rossana Reyes DO  Dx:   Encounter Diagnosis     ICD-10-CM    1  Vertigo  R42                   Assessment  Assessment details: Terence zayas is a 46 y o  female referred with primary diagnosis of Vertigo  (primary encounter diagnosis)   Patient presents with the following functional limitations: Dizziness with transfers and activity durin the day  She is currently not working due to fear of driving  Patient had no peripheral symptoms today, but did have some central symptoms in testing  She will benefit from follow up consultation and possible treatment with our Neuro PT department  I have spoken with our Neuro office and they will be contacting patient to schedule her next appointment  Impairments: impaired balance  Functional limitations: Dizziness with transfers and activity durin the day  Understanding of Dx/Px/POC: good   Prognosis: good    Goals  Transfer to Neuro PT    Plan  Patient would benefit from: skilled physical therapy  Referral necessary: No  Treatment plan discussed with: patient        Subjective Evaluation    History of Present Illness  Mechanism of injury: Patient reports insidious onset of dizziness about 2 weeks ago  Initially, she had a couple of episodes  A day later, the dizziness got worse  She states any movement triggers her symptoms  She saw her PCP and was prescribed Meclizine which she states did not help with her symptoms  She is referred now to outpatient PT services  Pain  No pain reported      Diagnostic Tests  No diagnostic tests performed    FCE comments: Spinning sensation: where the room is spinning, or she feels like her brain is spinning  She reports her eye glass prescription was recently updated, but she doesn't have her new glasses    Moving her eyes from one spot to another can trigger her symptoms (head is stationary)  Dizziness with transfers sit to left sidelying (90% of the time)  Patient feels dizzy when walking occasionally  Denies falls or LOB  Treatments  Previous treatment: medication  Current treatment: medication  Patient Goals  Patient goals for therapy: improved balance  Patient goal: Abolish dizziness        Objective     Concurrent Complaints  Positive for dizziness, headaches and nausea/motion sickness  Negative for faints, difficulty breathing, shortness of breath, history of cancer and history of trauma    Active Range of Motion   Cervical/Thoracic Spine       Cervical    Flexion:  WFL  Extension:  WFL  Left lateral flexion:  WFL  Right lateral flexion:  WFL  Left rotation:  WF  Right rotation:  [facility]    Tests   Cervical   Negative VBI  Additional Tests Details  (-) spontaneous nystagmus, Saccadic eye movements, Slow VOR, Bilateral Parma-Hallpike and bilateral horizontal roll tests  (+) Fast VOR, Smooth Pursuit    Ambulation     Ambulation: Level Surfaces   Ambulation without assistive device: independent    Observational Gait   Decreased walking speed and stride length  Neuro Exam:     Headaches   Patient reports headaches: Yes                Precautions: Asthma, Depression, Anxiety      Manuals 2/14            IE performed JF                                                   Neuro Re-Ed             Pt education Vestigular system anatomy and dysfunction                                                                                          Ther Ex                                                                                                                     Ther Activity                                       Gait Training                                       Modalities

## 2022-02-15 ENCOUNTER — EVALUATION (OUTPATIENT)
Dept: PHYSICAL THERAPY | Facility: CLINIC | Age: 52
End: 2022-02-15
Payer: COMMERCIAL

## 2022-02-15 DIAGNOSIS — R42 VERTIGO: Primary | ICD-10-CM

## 2022-02-15 DIAGNOSIS — R42 DIZZINESS: ICD-10-CM
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
## 2022-02-17 ENCOUNTER — APPOINTMENT (OUTPATIENT)
Dept: LAB | Facility: CLINIC | Age: 52
End: 2022-02-17
Payer: COMMERCIAL

## 2022-02-17 ENCOUNTER — OFFICE VISIT (OUTPATIENT)
Dept: PHYSICAL THERAPY | Facility: CLINIC | Age: 52
End: 2022-02-17
Payer: COMMERCIAL

## 2022-02-17 DIAGNOSIS — R42 DIZZINESS: ICD-10-CM

## 2022-02-17 DIAGNOSIS — R42 VERTIGO: Primary | ICD-10-CM

## 2022-02-17 DIAGNOSIS — E78.00 PURE HYPERCHOLESTEROLEMIA: ICD-10-CM

## 2022-02-17 DIAGNOSIS — R79.89 ELEVATED SERUM CREATININE: ICD-10-CM

## 2022-02-17 LAB
ANION GAP SERPL CALCULATED.3IONS-SCNC: 5 MMOL/L (ref 4–13)
BUN SERPL-MCNC: 19 MG/DL (ref 5–25)
CALCIUM SERPL-MCNC: 9.5 MG/DL (ref 8.3–10.1)
CHLORIDE SERPL-SCNC: 104 MMOL/L (ref 100–108)
CO2 SERPL-SCNC: 31 MMOL/L (ref 21–32)
CREAT SERPL-MCNC: 0.95 MG/DL (ref 0.6–1.3)
GFR SERPL CREATININE-BSD FRML MDRD: 68 ML/MIN/1.73SQ M
GLUCOSE P FAST SERPL-MCNC: 91 MG/DL (ref 65–99)
POTASSIUM SERPL-SCNC: 3.9 MMOL/L (ref 3.5–5.3)
SODIUM SERPL-SCNC: 140 MMOL/L (ref 136–145)

## 2022-02-17 PROCEDURE — 36415 COLL VENOUS BLD VENIPUNCTURE: CPT

## 2022-02-17 PROCEDURE — 80048 BASIC METABOLIC PNL TOTAL CA: CPT

## 2022-02-17 PROCEDURE — 97112 NEUROMUSCULAR REEDUCATION: CPT | Performed by: PHYSICAL THERAPIST

## 2022-02-17 RX ORDER — CYCLOSPORINE 0.5 MG/ML
EMULSION OPHTHALMIC
COMMUNITY
Start: 2022-02-14

## 2022-02-17 NOTE — PROGRESS NOTES
Daily Note     Today's date: 2022  Patient name: Tomás Pinto  : 1970  MRN: [id number]  Referring provider: Zakia Pettit DO  Dx:   Encounter Diagnosis     ICD-10-CM    1  Vertigo  R42    2  Dizziness  R42        Start Time:   Stop Time: 1300  Total time in clinic (min): 55 minutes    Subjective: after last session, has bad earache in left ear, still feeling little stuffy  was more dizzy rested in sitting for 2 hours and then went upstairs to the bed where things were spinning more, once she fell asleep she was okay  Got out of bed fast the next morning -  Was spinning - lasted about 30 seconds  upon waking this morning moved slowly - wasn't spinning, coming down stairs head spin, but no room spin  Overall is feeling a little better  Still doesn't like to concentrate on reading, and still feeling a little motion sick in the care   Also sees acupuncturist for burning foot - and added dizziness into her treatment - but not sure it helped for the dizziness  Objective: See treatment diary below  simón hallpike:   L (-)  R (+)  R epley x1  Re-test: (-)    Roll test: NT    DVA NV PRN   FGA NV PRN    Assessment: with increased sway upon upright sitting after 1st CRT, declined increased dizziness  R side negative upon re-test  Educated patient on causes of BPPV, treatments and techniques to utilized when dizziness comes one (find stable target, increased phyiscal point of contacts on stable surfaces)  Patient with good understanding  Noting she may still have some vestibular hypofunction due to difficulty with reading and motion sickness in car - will assess at subsequent visit as able due to BPPV testing and treatment  Plan: Continue per plan of care        Short Term Goal Expiration Date:(2 weeks)  Long Term Goal Expiration Date: (4 weeks)  POC Expiration Date: (4 weeks)         Precautions: dizziness         Manuals 02/15 2/17                                      Neuro Re-Ed Ther Ex                                                                        Ther Activity                        Gait Training                        Modalities

## 2022-02-18 ENCOUNTER — OFFICE VISIT (OUTPATIENT)
Dept: FAMILY MEDICINE CLINIC | Facility: CLINIC | Age: 52
End: 2022-02-18
Payer: COMMERCIAL

## 2022-02-18 VITALS
HEIGHT: 66 IN | HEART RATE: 75 BPM | WEIGHT: 190 LBS | TEMPERATURE: 97.7 F | SYSTOLIC BLOOD PRESSURE: 116 MMHG | BODY MASS INDEX: 30.53 KG/M2 | DIASTOLIC BLOOD PRESSURE: 80 MMHG | OXYGEN SATURATION: 98 %

## 2022-02-18 DIAGNOSIS — R42 VERTIGO: ICD-10-CM

## 2022-02-18 DIAGNOSIS — N32.89 BLADDER IRRITATION: ICD-10-CM

## 2022-02-18 DIAGNOSIS — I10 PRIMARY HYPERTENSION: Primary | ICD-10-CM

## 2022-02-18 LAB
SL AMB  POCT GLUCOSE, UA: NEGATIVE
SL AMB LEUKOCYTE ESTERASE,UA: NEGATIVE
SL AMB POCT BILIRUBIN,UA: NEGATIVE
SL AMB POCT BLOOD,UA: NEGATIVE
SL AMB POCT KETONES,UA: NEGATIVE
SL AMB POCT NITRITE,UA: NEGATIVE
SL AMB POCT PH,UA: 6.5
SL AMB POCT SPECIFIC GRAVITY,UA: 1.01
SL AMB POCT URINE PROTEIN: NEGATIVE
SL AMB POCT UROBILINOGEN: 0.2

## 2022-02-18 PROCEDURE — 99214 OFFICE O/P EST MOD 30 MIN: CPT | Performed by: FAMILY MEDICINE

## 2022-02-18 PROCEDURE — 81003 URINALYSIS AUTO W/O SCOPE: CPT | Performed by: FAMILY MEDICINE
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
## 2022-02-21 ENCOUNTER — OFFICE VISIT (OUTPATIENT)
Dept: PHYSICAL THERAPY | Facility: CLINIC | Age: 52
End: 2022-02-21
Payer: COMMERCIAL

## 2022-02-21 DIAGNOSIS — R42 DIZZINESS: ICD-10-CM

## 2022-02-21 DIAGNOSIS — R42 VERTIGO: Primary | ICD-10-CM

## 2022-02-21 PROCEDURE — 97112 NEUROMUSCULAR REEDUCATION: CPT | Performed by: PHYSICAL THERAPIST

## 2022-02-21 NOTE — PROGRESS NOTES
Daily Note     Today's date: 2022  Patient name: Ebonie Gilbert  : 1970  MRN: [id number]  Referring provider: José Antonio Medina DO  Dx:   Encounter Diagnosis     ICD-10-CM    1  Vertigo  R42    2  Dizziness  R42                   Subjective: hasn't attempted driving  Computed work last night didn't go well made her nauseous and spinny   Feels like she is on a tilt a whirl  But does feel better  Left ear is still clogged   Objective: See treatment diary below  simón hallpike:   L (+) for subjective dizziness without nystagmus lasting >30s  Re-test: decreased subjective symptome lastim 10"  R (-)    Roll test: (-) B/L    DVA: 8 line difference        Assessment: negative for BPPV, symptoms improved with repeated testing, no nystagmus noted, no CRT's performed today  Reviewed hep provided by previous therapist, Patient performing In correctly  Instructed patient in VOR exercises and provided written instructions for proper HEP  45s to long for activity, making patient more dizzy, advised to complete for 30s instead at home  Patient with good understanding, stressed importance of completing HEP 5x/day     Plan: Continue per plan of care        Short Term Goal Expiration Date:(2 weeks)  Long Term Goal Expiration Date: (4 weeks)  POC Expiration Date: (4 weeks)         Precautions: dizziness         Manuals 02/15 2/17 2/21                                     Neuro Re-Ed         VOR    Seated   Plain   H: 45"  V 45"    Instructions for HEP                                                          Ther Ex                                                                        Ther Activity                        Gait Training                        Modalities

## 2022-02-24 ENCOUNTER — OFFICE VISIT (OUTPATIENT)
Dept: PHYSICAL THERAPY | Facility: CLINIC | Age: 52
End: 2022-02-24
Payer: COMMERCIAL

## 2022-02-24 DIAGNOSIS — R42 VERTIGO: Primary | ICD-10-CM

## 2022-02-24 DIAGNOSIS — R42 DIZZINESS: ICD-10-CM

## 2022-02-24 PROCEDURE — 97112 NEUROMUSCULAR REEDUCATION: CPT | Performed by: PHYSICAL THERAPIST

## 2022-02-24 NOTE — PROGRESS NOTES
Daily Note   Today's date: 2022  Patient name: Barbara Mas  : 1970  MRN: [id number]  Referring provider: Cecy Castro DO  Dx:   Encounter Diagnosis     ICD-10-CM    1  Vertigo  R42    2  Dizziness  R42        Start Time: 1300          Subjective: Reports she is feeling much better, is unsure if she requires PT any longer  Driving, but only shorter distances on back roads  Improved tolerance to VOR exercises, compliance with 5x/day      Objective: See treatment diary below   MCTSIB  30s eyes open firm surface   30s eyes closed form surface  30s eyes open foam surface  30s eyes closed foam surface    FGA:  (not at high risk for falls)    Assessment: Demonstarted good technique and improved tolerance to VOR with plain background, attempted to progress to busy background, patient with decreased gaze stability and subjective complaints of  Dizziness and headache  Advised patient to progress VOR hep with busy background to 15" advance by 15" as tolerated until she can tolerate 1 minutes  Improved mCTSIB testing today  Not a fall risk per FGA, but with some limitations and would benefit from progression of dynamic balance practice  Plan: Continue per plan of care   2x/week      Short Term Goal Expiration Date:(2 weeks)  Long Term Goal Expiration Date: (4 weeks)  POC Expiration Date: (4 weeks)         Precautions: dizziness         Manuals 02/15 2/17 2/21 02/24                                    Neuro Re-Ed         VOR    Seated   Plain   H: 45"  V 45"    Instructions for HEP      Seated plain   H 30" for review patient performing adequately     Seated busy  H 15" x 3   V 15"x 1                                                    Ther Ex                                                                        Ther Activity                        Gait Training                        Modalities

## 2022-03-01 ENCOUNTER — OFFICE VISIT (OUTPATIENT)
Dept: PHYSICAL THERAPY | Facility: CLINIC | Age: 52
End: 2022-03-01
Payer: COMMERCIAL

## 2022-03-01 DIAGNOSIS — R42 DIZZINESS: ICD-10-CM

## 2022-03-01 DIAGNOSIS — R42 VERTIGO: Primary | ICD-10-CM

## 2022-03-01 PROCEDURE — 97112 NEUROMUSCULAR REEDUCATION: CPT | Performed by: PHYSICAL THERAPIST

## 2022-03-01 NOTE — PROGRESS NOTES
Daily Note     Today's date: 3/1/2022  Patient name: Jung Saunders  : 1970  MRN: [id number]  Referring provider: Helena Soto DO  Dx:   Encounter Diagnosis     ICD-10-CM    1  Vertigo  R42    2  Dizziness  R42                   Subjective: Feeling better, still feels like she is on a [id number]  doesn't likes driving on the highway  D 6/10 at it's worst   Going down staris too fast things spin - but is okay if she goes slow  Is performing all movements slowly  Has not returned back to work  Is performing advanced HEP    Objective: See treatment diary below  [place]-hallpike: Left with upward beating left rotation nystagmus with subjective of dizziness delayed onset 5sec lasting <30s  R (-)  L epley x1 :  [employer] retest - decreased nystagmus and dizziness lasting 5sec  L epley x 1  [employer] -clear of dizziness and nystagmus    Assessment: patient again present to Pt with left sided posterior canalithiasis responded well to treatment today  Educated patient on self epley maneuvers - with fair understanding  Provided patient with written instructions - able to demonstrate understanding  Advised patient to perform maneuver at least 2x/day but definitely when getting out of bed in the morning  Review compliance and tolerance at next session       Plan: Continue per plan of care        Short Term Goal Expiration Date:(2 weeks)  Long Term Goal Expiration Date: (4 weeks)  POC Expiration Date: (4 weeks)         Precautions: dizziness         Manuals 02/15 2/17 2/21 02/24                                    Neuro Re-Ed         VOR    Seated   Plain   H: 45"  V 45"    Instructions for HEP      Seated plain   H 30" for review patient performing adequately     Seated busy  H 15" x 3   V 15"x 1                                                    Ther Ex                                                                        Ther Activity                        Gait Training Modalities

## 2022-03-02 ENCOUNTER — HOSPITAL ENCOUNTER (OUTPATIENT)
Dept: CT IMAGING | Facility: HOSPITAL | Age: 52
Discharge: HOME | End: 2022-03-02
Payer: COMMERCIAL

## 2022-03-02 ENCOUNTER — OFFICE VISIT (OUTPATIENT)
Dept: FAMILY MEDICINE CLINIC | Facility: CLINIC | Age: 52
End: 2022-03-02
Payer: COMMERCIAL

## 2022-03-02 VITALS
DIASTOLIC BLOOD PRESSURE: 76 MMHG | SYSTOLIC BLOOD PRESSURE: 134 MMHG | HEIGHT: 66 IN | HEART RATE: 87 BPM | WEIGHT: 189 LBS | OXYGEN SATURATION: 97 % | TEMPERATURE: 100.2 F | BODY MASS INDEX: 30.37 KG/M2

## 2022-03-02 DIAGNOSIS — R10.30 LOWER ABDOMINAL PAIN: ICD-10-CM

## 2022-03-02 DIAGNOSIS — R10.30 LOWER ABDOMINAL PAIN: Primary | ICD-10-CM

## 2022-03-02 PROCEDURE — 74177 CT ABD & PELVIS W/CONTRAST: CPT

## 2022-03-02 PROCEDURE — G1004 CDSM NDSC: HCPCS

## 2022-03-02 PROCEDURE — 99214 OFFICE O/P EST MOD 30 MIN: CPT | Performed by: FAMILY MEDICINE

## 2022-03-02 RX ADMIN — IOHEXOL 50 ML: 240 INJECTION, SOLUTION INTRATHECAL; INTRAVASCULAR; INTRAVENOUS; ORAL at 12:57

## 2022-03-02 RX ADMIN — IOHEXOL 100 ML: 350 INJECTION, SOLUTION INTRAVENOUS at 12:57
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
## 2022-03-04 ENCOUNTER — APPOINTMENT (OUTPATIENT)
Dept: PHYSICAL THERAPY | Facility: CLINIC | Age: 52
End: 2022-03-04
Payer: COMMERCIAL

## 2022-03-07 ENCOUNTER — TELEPHONE (OUTPATIENT)
Dept: FAMILY MEDICINE CLINIC | Facility: CLINIC | Age: 52
End: 2022-03-07

## 2022-03-07 NOTE — TELEPHONE ENCOUNTER
Glad she is feeling better -- since she had a benign colonoscopy in 2016, she is not necessary due yet, but its reasonable to schedule a follow up with Dr Birdie Romero to see if he would like to repeat a scope sooner given her diverticulitis episode  Thanks!

## 2022-03-07 NOTE — TELEPHONE ENCOUNTER
Pt calls, she is starting to feel a bit better from the diverticulitis  She would like to know what the next step would be  States she had read to get a colonoscopy? Please advise, thank you!

## 2022-03-09 ENCOUNTER — OFFICE VISIT (OUTPATIENT)
Dept: PHYSICAL THERAPY | Facility: CLINIC | Age: 52
End: 2022-03-09
Payer: COMMERCIAL

## 2022-03-09 DIAGNOSIS — R42 DIZZINESS: ICD-10-CM

## 2022-03-09 DIAGNOSIS — R42 VERTIGO: Primary | ICD-10-CM

## 2022-03-09 PROCEDURE — 97112 NEUROMUSCULAR REEDUCATION: CPT | Performed by: PHYSICAL THERAPIST

## 2022-03-09 NOTE — PROGRESS NOTES
Daily Note     Today's date: 3/9/2022  Patient name: Stefan Win  : 1970  MRN: [id number]  Referring provider: Ayanna Myers DO  Dx:   Encounter Diagnosis     ICD-10-CM    1  Vertigo  R42    2  Dizziness  R42        Start Time: 1200        Assessment: with negative testing for BPPV in all planes B/L  Able to demonstrate adequate use of sensory integration system and with normal FGA  Patient and PT agree to 30 day hold at this time, patient may return to PT if symptoms of dizziness return/increase  Plan: 30 day hold    Subjective: On Wed pre attack was able to do things around the house without dizziness attacks  Missed last therapies due to diverticulosis  Had CT-scan for stomach issues  Had to be on bed rest  Did not attempt self CRT because it hurt to move around      Objective: See treatment diary below  D 2/10    dixhallpike  R (-)  L (-)    Roll   B/L (-)    FGA 30/30    mcstib  Level   FTEO 30"  FTEC 30"  ariex  FTEO 30"  FTEC 30"         Short Term Goal Expiration Date:(2 weeks)  Long Term Goal Expiration Date: (4 weeks)  POC Expiration Date: (4 weeks)         Precautions: dizziness         Manuals 02/15 2/17 2/21 02/24 03/09                                   Neuro Re-Ed         VOR    Seated   Plain   H: 45"  V 45"    Instructions for HEP      Seated plain   H 30" for review patient performing adequately     Seated busy  H 15" x 3   V 15"x 1                                                    Ther Ex                                                                        Ther Activity                        Gait Training                        Modalities

## 2022-03-10 ENCOUNTER — APPOINTMENT (OUTPATIENT)
Dept: PHYSICAL THERAPY | Facility: CLINIC | Age: 52
End: 2022-03-10
Payer: COMMERCIAL

## 2022-03-29 DIAGNOSIS — F41.9 ANXIETY AND DEPRESSION: ICD-10-CM

## 2022-03-29 DIAGNOSIS — F32.A ANXIETY AND DEPRESSION: ICD-10-CM

## 2022-03-29 DIAGNOSIS — E66.09 CLASS 1 OBESITY DUE TO EXCESS CALORIES WITH SERIOUS COMORBIDITY AND BODY MASS INDEX (BMI) OF 30.0 TO 30.9 IN ADULT: ICD-10-CM

## 2022-03-29 RX ORDER — BUPROPION HYDROCHLORIDE 150 MG/1
TABLET, EXTENDED RELEASE ORAL
Qty: 60 TABLET | Refills: 2 | Status: SHIPPED | OUTPATIENT
Start: 2022-03-29

## 2022-03-30 ENCOUNTER — OFFICE VISIT (OUTPATIENT)
Dept: GASTROENTEROLOGY | Facility: CLINIC | Age: 52
End: 2022-03-30
Payer: COMMERCIAL

## 2022-03-30 ENCOUNTER — PREP FOR PROCEDURE (OUTPATIENT)
Dept: GASTROENTEROLOGY | Facility: CLINIC | Age: 52
End: 2022-03-30

## 2022-03-30 VITALS
BODY MASS INDEX: 30.34 KG/M2 | WEIGHT: 188.8 LBS | OXYGEN SATURATION: 98 % | HEIGHT: 66 IN | DIASTOLIC BLOOD PRESSURE: 90 MMHG | SYSTOLIC BLOOD PRESSURE: 120 MMHG | HEART RATE: 82 BPM

## 2022-03-30 DIAGNOSIS — Z86.010 HISTORY OF COLON POLYPS: ICD-10-CM

## 2022-03-30 DIAGNOSIS — K57.92 DIVERTICULITIS: Primary | ICD-10-CM

## 2022-03-30 PROCEDURE — 99214 OFFICE O/P EST MOD 30 MIN: CPT | Performed by: PHYSICIAN ASSISTANT
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
## 2022-03-30 NOTE — PATIENT INSTRUCTIONS
Scheduled date of colonoscopy (as of today): 5/11  Physician performing colonoscopy:MATTHEW  Location of colonoscopy:[facility]  Bowel prep reviewed with patient:MIRALAX  Instructions reviewed with patient by:BB  Clearances:

## 2022-05-27 ENCOUNTER — OFFICE VISIT (OUTPATIENT)
Dept: FAMILY MEDICINE CLINIC | Facility: CLINIC | Age: 52
End: 2022-05-27
Payer: COMMERCIAL

## 2022-05-27 VITALS
WEIGHT: 186.2 LBS | HEIGHT: 66 IN | SYSTOLIC BLOOD PRESSURE: 138 MMHG | DIASTOLIC BLOOD PRESSURE: 86 MMHG | BODY MASS INDEX: 29.92 KG/M2 | HEART RATE: 86 BPM | TEMPERATURE: 97.1 F | OXYGEN SATURATION: 95 %

## 2022-05-27 DIAGNOSIS — R41.3 MEMORY LOSS: ICD-10-CM

## 2022-05-27 DIAGNOSIS — F17.211 CIGARETTE NICOTINE DEPENDENCE IN REMISSION: ICD-10-CM

## 2022-05-27 DIAGNOSIS — M25.50 ARTHRALGIA, UNSPECIFIED JOINT: ICD-10-CM

## 2022-05-27 DIAGNOSIS — R29.898 BILATERAL ARM WEAKNESS: Primary | ICD-10-CM

## 2022-05-27 PROCEDURE — 99214 OFFICE O/P EST MOD 30 MIN: CPT | Performed by: FAMILY MEDICINE

## 2022-05-27 PROCEDURE — 3008F BODY MASS INDEX DOCD: CPT | Performed by: FAMILY MEDICINE

## 2022-05-27 PROCEDURE — 3079F DIAST BP 80-89 MM HG: CPT | Performed by: FAMILY MEDICINE

## 2022-05-27 PROCEDURE — 1036F TOBACCO NON-USER: CPT | Performed by: FAMILY MEDICINE

## 2022-05-27 NOTE — PROGRESS NOTES
Assessment/Plan:    No problem-specific Assessment & Plan notes found for this encounter  Diagnoses and all orders for this visit:    Bilateral arm weakness  -     XR spine cervical complete 4 or 5 vw non injury; Future  -     Ambulatory Referral to Physical Therapy; Future    Arthralgia, unspecified joint  -     Antinuclear Antibodies (SANIYA), IFA; Future  -     Rheumatoid Arthritis Factor; Future  -     Lyme Antibody Profile with reflex to WB; Future  -     CK (with reflex to MB); Future    Memory loss  -     Vitamin B12; Future  -     TSH, 3rd generation with Free T4 reflex; Future  -     Ambulatory Referral to Neurology; Future    Cigarette nicotine dependence in remission  -     CT lung screening program; Future      Follow up in 3 months or as needed    Subjective:      Patient ID: Max Santiago is a 46 y o  female  Patient is here because she has been having bilateral arm weakness and pain  She says that she is having difficulty opening cans  Also doing things with both arms bother her  In addition she has been having memory issues and forgets what she just read  She is a current every day smoker and quit more than 6 months ago  The following portions of the patient's history were reviewed and updated as appropriate:   She  has a past medical history of Anxiety, Arthritis, Asthma, Bleeding, Depression, Diverticulitis, GERD (gastroesophageal reflux disease), History of transfusion (2001), Hyperlipidemia, Moderate persistent asthma, Obesity (BMI 30 0-34 9), and Osteoarthritis    She   Patient Active Problem List    Diagnosis Date Noted    Bilateral arm weakness 05/27/2022    Arthralgia 05/27/2022    Memory loss 05/27/2022    Cigarette nicotine dependence in remission 05/27/2022    Vertigo 02/11/2022    Primary hypertension 02/11/2022    S/P laparoscopic hysterectomy 10/06/2021    Postmenopausal bleeding 07/28/2021    Complex tear of medial meniscus of right knee as current injury 09/01/2020    Chronic arthralgias of knees and hips 07/02/2020    Class 1 obesity due to excess calories with serious comorbidity and body mass index (BMI) of 30 0 to 30 9 in adult 07/02/2020    Anxiety 09/10/2019    Grief at loss of child 06/18/2019    Psychophysiological insomnia 06/18/2019    Carpal tunnel syndrome of right wrist 02/06/2018    Osteoarthritis of spine with radiculopathy, cervical region 02/06/2018    Overweight (BMI 25 0-29 9) 04/04/2017    Pure hypercholesterolemia 04/07/2016    Degenerative cervical disc 02/17/2016    Seasonal allergic rhinitis 02/17/2016    Moderate persistent asthma 01/14/2014     She  has a past surgical history that includes Abdominoplasty; Cholecystectomy; Colonoscopy; [place] tooth extraction; pr knee scope,med/lat menisectomy (Right, 10/8/2020); Breast biopsy (Right, 2003); EGD; Dilation and curettage, diagnostic / therapeutic; Esophageal dilation; Skin graft; Hysterectomy (Bilateral, 10/6/2021); and CYSTOSCOPY (N/A, 10/6/2021)  Her family history includes Colon cancer in her paternal grandfather; Diabetes in her maternal grandfather and maternal grandmother; Hypertension in her father; Osteoporosis in her mother  She  reports that she quit smoking about 7 months ago  Her smoking use included cigarettes  She has never used smokeless tobacco  She reports current alcohol use of about 2 0 standard drinks of alcohol per week  She reports that she does not use drugs    Current Outpatient Medications   Medication Sig Dispense Refill    albuterol (PROVENTIL HFA,VENTOLIN HFA) 90 mcg/act inhaler Inhale 1 puff every 6 (six) hours as needed for shortness of breath 18 g 0    buPROPion (WELLBUTRIN SR) 150 mg 12 hr tablet take 1 tablet by mouth twice a day 60 tablet 2    cholecalciferol (VITAMIN D3) 1,000 units tablet Take 1,000 Units by mouth daily       Flovent  MCG/ACT inhaler inhale 2 puffs by mouth and INTO THE LUNGS twice a day Rinse mouth after use 12 g 2  fluticasone (FLONASE) 50 mcg/act nasal spray 1 spray into each nostril as needed       Multiple Vitamin (MULTI-VITAMIN DAILY PO)       pantoprazole (PROTONIX) 40 mg tablet Take 1 tablet (40 mg total) by mouth 2 (two) times a day 60 tablet 11    Restasis 0 05 % ophthalmic emulsion instill 1 drop into both eyes twice a day      Black Cohosh (REMIFEMIN MENOPAUSE PO) Remifemin Menopause (Patient not taking: Reported on 5/27/2022)      fluticasone-vilanterol (BREO ELLIPTA) 200-25 MCG/INH inhaler inhalation 1 puff by mouth once daily (Patient not taking: Reported on 5/27/2022)      hydrochlorothiazide (HYDRODIURIL) 25 mg tablet Take 1 tablet (25 mg total) by mouth daily (Patient not taking: Reported on 5/27/2022) 30 tablet 0     No current facility-administered medications for this visit       Current Outpatient Medications on File Prior to Visit   Medication Sig    albuterol (PROVENTIL HFA,VENTOLIN HFA) 90 mcg/act inhaler Inhale 1 puff every 6 (six) hours as needed for shortness of breath    buPROPion (WELLBUTRIN SR) 150 mg 12 hr tablet take 1 tablet by mouth twice a day    cholecalciferol (VITAMIN D3) 1,000 units tablet Take 1,000 Units by mouth daily     Flovent  MCG/ACT inhaler inhale 2 puffs by mouth and INTO THE LUNGS twice a day Rinse mouth after use    fluticasone (FLONASE) 50 mcg/act nasal spray 1 spray into each nostril as needed     Multiple Vitamin (MULTI-VITAMIN DAILY PO)     pantoprazole (PROTONIX) 40 mg tablet Take 1 tablet (40 mg total) by mouth 2 (two) times a day    Restasis 0 05 % ophthalmic emulsion instill 1 drop into both eyes twice a day    Black Cohosh (REMIFEMIN MENOPAUSE PO) Remifemin Menopause (Patient not taking: Reported on 5/27/2022)    fluticasone-vilanterol (BREO ELLIPTA) 200-25 MCG/INH inhaler inhalation 1 puff by mouth once daily (Patient not taking: Reported on 5/27/2022)    hydrochlorothiazide (HYDRODIURIL) 25 mg tablet Take 1 tablet (25 mg total) by mouth daily (Patient not taking: Reported on 5/27/2022)     No current facility-administered medications on file prior to visit  She is allergic to penicillins and coconut oil - food allergy       Review of Systems   Constitutional: Negative for activity change, appetite change, fatigue and fever  HENT: Negative for congestion and ear discharge  Respiratory: Negative for cough and shortness of breath  Cardiovascular: Negative for chest pain and palpitations  Gastrointestinal: Negative for diarrhea and nausea  Musculoskeletal: Positive for arthralgias and myalgias  Negative for back pain  Skin: Negative for color change and rash  Neurological: Positive for weakness  Negative for dizziness and headaches  Psychiatric/Behavioral: Negative for agitation and behavioral problems  Objective:      /86   Pulse 86   Temp (!) 97 1 °F (36 2 °C) (Tympanic)   Ht 5' 6" (1 676 m)   Wt 84 5 kg (186 lb 3 2 oz)   SpO2 95%   BMI 30 05 kg/m²          Physical Exam  Constitutional:       General: She is not in acute distress  Appearance: She is well-developed  She is not diaphoretic  HENT:      Head: Normocephalic and atraumatic  Nose: Nose normal    Eyes:      Conjunctiva/sclera: Conjunctivae normal       Pupils: Pupils are equal, round, and reactive to light  Cardiovascular:      Rate and Rhythm: Normal rate and regular rhythm  Heart sounds: Normal heart sounds  No murmur heard  Pulmonary:      Effort: Pulmonary effort is normal  No respiratory distress  Breath sounds: Normal breath sounds  No wheezing  Abdominal:      General: Bowel sounds are normal  There is no distension  Palpations: Abdomen is soft  Tenderness: There is no abdominal tenderness  Musculoskeletal:         General: Tenderness present  Skin:     General: Skin is warm and dry  Findings: No erythema or rash     Neurological:      Mental Status: She is alert and oriented to person, place, and time

## 2022-05-31 ENCOUNTER — APPOINTMENT (OUTPATIENT)
Dept: RADIOLOGY | Facility: CLINIC | Age: 52
End: 2022-05-31
Payer: COMMERCIAL

## 2022-05-31 ENCOUNTER — APPOINTMENT (OUTPATIENT)
Dept: LAB | Facility: CLINIC | Age: 52
End: 2022-05-31
Payer: COMMERCIAL

## 2022-05-31 DIAGNOSIS — R29.898 BILATERAL ARM WEAKNESS: ICD-10-CM

## 2022-05-31 DIAGNOSIS — M25.50 ARTHRALGIA, UNSPECIFIED JOINT: ICD-10-CM

## 2022-05-31 DIAGNOSIS — R41.3 MEMORY LOSS: ICD-10-CM

## 2022-05-31 LAB
CK SERPL-CCNC: 99 U/L (ref 26–192)
TSH SERPL DL<=0.05 MIU/L-ACNC: 2.39 UIU/ML (ref 0.45–4.5)
VIT B12 SERPL-MCNC: 1378 PG/ML (ref 100–900)

## 2022-05-31 PROCEDURE — 84443 ASSAY THYROID STIM HORMONE: CPT

## 2022-05-31 PROCEDURE — 72050 X-RAY EXAM NECK SPINE 4/5VWS: CPT

## 2022-05-31 PROCEDURE — 82607 VITAMIN B-12: CPT

## 2022-05-31 PROCEDURE — 86038 ANTINUCLEAR ANTIBODIES: CPT

## 2022-05-31 PROCEDURE — 86618 LYME DISEASE ANTIBODY: CPT

## 2022-05-31 PROCEDURE — 36415 COLL VENOUS BLD VENIPUNCTURE: CPT

## 2022-05-31 PROCEDURE — 86430 RHEUMATOID FACTOR TEST QUAL: CPT

## 2022-05-31 PROCEDURE — 82550 ASSAY OF CK (CPK): CPT

## 2022-06-01 LAB
ANA SPECKLED TITR SER: ABNORMAL {TITER}
ANA TITR SER IF: POSITIVE {TITER}
B BURGDOR IGG+IGM SER-ACNC: 17
RHEUMATOID FACT SER QL LA: NEGATIVE
SL AMB NOTE:: ABNORMAL

## 2022-06-02 DIAGNOSIS — M25.50 ARTHRALGIA, UNSPECIFIED JOINT: Primary | ICD-10-CM

## 2022-06-08 ENCOUNTER — EVALUATION (OUTPATIENT)
Dept: PHYSICAL THERAPY | Facility: CLINIC | Age: 52
End: 2022-06-08
Payer: COMMERCIAL

## 2022-06-08 DIAGNOSIS — R29.898 BILATERAL ARM WEAKNESS: Primary | ICD-10-CM

## 2022-06-08 DIAGNOSIS — M25.512 LEFT SHOULDER PAIN, UNSPECIFIED CHRONICITY: ICD-10-CM

## 2022-06-08 PROCEDURE — 97162 PT EVAL MOD COMPLEX 30 MIN: CPT

## 2022-06-08 PROCEDURE — 97110 THERAPEUTIC EXERCISES: CPT

## 2022-06-08 NOTE — PROGRESS NOTES
PT Evaluation     Today's date: 2022  Patient name: Herson Mosley  : 1970  MRN: [id number]  Referring provider: Sylvia Murillo MD  Dx:   Encounter Diagnosis     ICD-10-CM    1  Bilateral arm weakness  R29 898 Ambulatory Referral to Physical Therapy   2  Left shoulder pain, unspecified chronicity  M25 512                   Assessment  Assessment details: Herson Mosley is a 46 y o  female presenting to physical therapy for an initial evaluation with a MD referral of bilateral shoulder weakness  The patient's symptoms appear to be consistent with left shoulder biceps tendinosis, supraspinatus tendinosis  Cervical examination WNL and did not reproduce patient's chief complaints  The patient demonstrates decreased and painful left shoulder ROM, decreased shoulder and elbow strength, as well as difficulty completing functional activities that require lifting such as groceries, laundry, and using a blow dryer  These deficits have limited the patient's ability to complete ADLs, vocational responsibilities, and recreational activities  This patient would benefit from OP PT services to address their impairments and functional limitations to maximize functional mobility  The patient was provided a HEP and demonstrated/verbalized understanding  Impairments: abnormal or restricted ROM, activity intolerance, impaired physical strength, lacks appropriate home exercise program and pain with function    Symptom irritability: moderateUnderstanding of Dx/Px/POC: excellent   Prognosis: good    Goals  STG to be achieved in 4 weeks: The patient will report a decrease in left shoulder "at worst" subjective pain rating score to at least 5/10 to allow for improved tolerance for overhead activities  The patient will increase left shoulder flexion AROM to at least 150 degrees to allow for improved functional mobility     The patient will increase left shoulder ER MMT score to at least 4+/5 to allow for improved functional mobility  LTG to be achieved by DC: The patient will be independent in comprehensive HEP  The patient will report no pain with usual activities  The patient will improve left shoulder AROM to [facility] to allow for improved functional mobility  The patient will increase left shoulder MMT score to [facility] to allow for improved functional mobility  The patient will be able to lift and carry her laundry basket without pain or difficulty  The patient will be able to lift and carry her purse and computer bag without pain or difficulty  The patient will be able to drive without pain or difficulty  Plan  Patient would benefit from: skilled physical therapy  Planned modality interventions: thermotherapy: hydrocollator packs, TENS and cryotherapy  Planned therapy interventions: manual therapy, joint mobilization, neuromuscular re-education, patient education, flexibility, strengthening, stretching, therapeutic activities, therapeutic exercise and home exercise program  Frequency: 1-2x per week  Duration in weeks: 10  Plan of Care beginning date: 6/8/2022  Plan of Care expiration date: 8/17/2022  Treatment plan discussed with: patient        Subjective Evaluation    History of Present Illness  Mechanism of injury: Brandy Cheung presents to therapy with a chief complaint of bilateral upper extremity weakness ongoing for 4-5 weeks  States that it initially started on her left arm and noticed that she had difficulty with carrying computer bag and purse due to the inability to pick it up and place on her shoulder  States that she switched to doing this on her right side and experienced the same symptoms  Reports that she also has difficulty reaching behind her back to put on/off her bra, difficulty lifting her blow dryer, carrying her laundry basket up stairs, carrying groceries, lifting a toddler, turning steering wheel when driving   States that her  has had multiple surgeries on his knees and so she needs to be able to do these activities  She reports that she had increased her advil dose and tried acupuncture which provides minimal short term relief  Denies any ANGUS or change in activity level that may have caused this  Reports that she also experiences a shooting pain that typically starts in her forearms, one side prior to the other generally L>R, and is described as shooting  Also can experience pain in her biceps region if performing extensive carrying throughout the day  Reports history of neck pain, however denies any recent onset of neck pain or difficulties moving her neck around the time that these symptoms have started  Denies numbness/tingling  Xray of cervical spine on 22 showed "disc space narrowing C5-6 and C6-7 with osteophyte formation and subchondral sclerosis is unchanged  Multilevel facet and uncovertebral hypertrophy  Mild foraminal stenosis most pronounced on the right at C3-4 "  Pain  Current pain ratin  At best pain ratin  At worst pain ratin  Location: bilateral forearms or biceps area L>R  Quality: shooting  Relieving factors: medications and rest  Aggravating factors: lifting  Progression: no change    Social Support  Steps to enter house: yes  Stairs in house: yes   Lives in: multiple-level home  Lives with: spouse    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, office work, walking customers through jobs)  Hand dominance: right      Diagnostic Tests  X-ray: abnormal  Treatments  Previous treatment: medication  Current treatment: physical therapy  Patient Goals  Patient goals for therapy: decreased pain, increased strength and independence with ADLs/IADLs  Patient goal: figure out why i have the pain and make it go away         Objective     Tenderness     Left Shoulder   Tenderness in the biceps tendon (proximal) and supraspinatus tendon       Neurological Testing     Sensation   Cervical/Thoracic   Left   Intact: light touch    Right   Intact: light touch    Reflexes   Left   Biceps (C5/C6): normal (2+)  Brachioradialis (C6): normal (2+)  Triceps (C7): normal (2+)    Right   Biceps (C5/C6): normal (2+)  Brachioradialis (C6): normal (2+)  Triceps (C7): normal (2+)    Active Range of Motion   Cervical/Thoracic Spine       Cervical    Flexion: 58 degrees   Extension: 60 degrees      Left lateral flexion: 48 degrees      Right lateral flexion: 45 ("tightness" ) degrees      Left rotation: 74 degrees  Right rotation: 74 degrees         Left Shoulder   Flexion: 130 degrees with pain  Abduction: 140 degrees with pain  External rotation BTH: C7 with pain  Internal rotation BTB: T9 with pain    Right Shoulder   Flexion: WFL  Abduction: WFL  External rotation BTH: T3   Internal rotation BTB: T6   Mechanical Assessment    Cervical    Seated retraction: repeated movements   Pain location: no change  Seated Flexion:  repeated movements  Pain location: no change  Seated Extension: repeated movements  Pain location: no change    Thoracic      Lumbar      Strength/Myotome Testing   Cervical Spine     Left   Interossei strength (t1): 5    Right   Interossei strength (t1): 5    Left Shoulder     Planes of Motion   Flexion: 5   Abduction: 4 (pain in biceps region)   External rotation at 45°: 4 (pain in biceps region)   Internal rotation at 45°: 5     Right Shoulder     Planes of Motion   Flexion: 5   Abduction: 4+   External rotation at 45°: 5   Internal rotation at 45°: 5     Left Elbow   Flexion: 4 (pain in biceps region)  Extension: 4+    Right Elbow   Flexion: 4+  Extension: 4+    Left Wrist/Hand   Wrist extension: 5  Wrist flexion: 4 (pain in biceps region)    Right Wrist/Hand   Wrist extension: 5  Wrist flexion: 4+    Tests   Cervical   Negative vertical compression and cervical distraction  Left   Negative Spurling's Test A  Right   Negative Spurling's Test A  Left Shoulder   Positive empty can, external rotation lag sign, full can, Neer's, Speed's and ULTT4  Negative ULTT1 and ULTT3  Right Shoulder   Positive ULTT4  Negative ULTT1 and ULTT3  Lumbar   Negative vertical compression                Precautions: anxiety, depression  Access Code: [email or web address]     Manuals 6/8            L shoulder PROM             IASTM to L biceps tendon                                       Neuro Re-Ed             TB rows             TB pulldowns                                                                               Ther Ex             UBE fwd/retro              Wall slides flex, scapt HEP            Shoulder IR stretch HEP            Corner pec stretch             TB shoulder ER             TB shoulder IR             Sidelying shoulder ER                          Pt education HEP, PT POC, anatomy, physiology             Ther Activity                                       Gait Training                                       Modalities

## 2022-06-15 ENCOUNTER — OFFICE VISIT (OUTPATIENT)
Dept: PHYSICAL THERAPY | Facility: CLINIC | Age: 52
End: 2022-06-15
Payer: COMMERCIAL

## 2022-06-15 DIAGNOSIS — M25.512 LEFT SHOULDER PAIN, UNSPECIFIED CHRONICITY: ICD-10-CM

## 2022-06-15 DIAGNOSIS — R29.898 BILATERAL ARM WEAKNESS: Primary | ICD-10-CM

## 2022-06-15 PROCEDURE — 97110 THERAPEUTIC EXERCISES: CPT

## 2022-06-15 PROCEDURE — 97112 NEUROMUSCULAR REEDUCATION: CPT

## 2022-06-15 NOTE — PROGRESS NOTES
Daily Note     Today's date: 6/15/2022  Patient name: Sharron Setting  : 1970  MRN: [id number]  Referring provider: Vicie Krabbe, MD  Dx:   Encounter Diagnosis     ICD-10-CM    1  Bilateral arm weakness  R29 898    2  Left shoulder pain, unspecified chronicity  M25 512                   Subjective: Patient reports that she had discomfort between her shoulder blades following her evaluation  Also notes that she has residual soreness in her shoulder following completion of her HEP  Objective: See treatment diary below      Assessment: Initiated treatment session as outlined below; tolerated treatment well  UT compensations noted with TB rows and pulldowns; required VCs to correct  Provided with updated HEP to include theraband exercises  Patient demonstrated and verbalized understanding it's completion  Patient demonstrated fatigue post treatment, exhibited good technique with therapeutic exercises and would benefit from continued PT      Plan: Continue per plan of care        Precautions: anxiety, depression  Access Code: [email or web address]     Manuals 6/8 6/15           L shoulder PROM             IASTM to L biceps tendon  BE                                     Neuro Re-Ed             TB rows  GTB 3" 2x10           TB pulldowns   GTB 3" 2x10                                                                             Ther Ex             UBE fwd/retro   4'/4'           Wall slides flex, scapt HEP 5" x10 ea           Shoulder IR stretch HEP            Corner pec stretch  3x30"            TB shoulder ER  GTB 2x10            TB shoulder IR  GTB 2x10            Sidelying shoulder ER                          Pt education HEP, PT POC, anatomy, physiology  HEP           Ther Activity                                       Gait Training                                       Modalities

## 2022-06-22 ENCOUNTER — OFFICE VISIT (OUTPATIENT)
Dept: PHYSICAL THERAPY | Facility: CLINIC | Age: 52
End: 2022-06-22
Payer: COMMERCIAL

## 2022-06-22 DIAGNOSIS — R29.898 BILATERAL ARM WEAKNESS: Primary | ICD-10-CM

## 2022-06-22 DIAGNOSIS — M25.512 LEFT SHOULDER PAIN, UNSPECIFIED CHRONICITY: ICD-10-CM

## 2022-06-22 PROCEDURE — 97110 THERAPEUTIC EXERCISES: CPT

## 2022-06-22 PROCEDURE — 97530 THERAPEUTIC ACTIVITIES: CPT

## 2022-06-22 NOTE — PROGRESS NOTES
Daily Note     Today's date: 2022  Patient name: Salvador Kim  : 1970  MRN: [id number]  Referring provider: Margurite Cabot, MD  Dx:   Encounter Diagnosis     ICD-10-CM    1  Bilateral arm weakness  R29 898    2  Left shoulder pain, unspecified chronicity  M25 512                   Subjective: Patient reports no shoulder pain if she is inactive  Pain in shoulder, bicep and tricep when actively using her shoulder  Objective: See treatment diary below      Assessment: Tolerated treatment well  Patient exhibited good technique with therapeutic exercises      Plan: Continue per plan of care        Precautions: anxiety, depression  Access Code: [email or web address]     Manuals 6/8 6/15 6/22          L shoulder PROM             IASTM to L biceps tendon  BE HA                                    Neuro Re-Ed             TB rows  GTB 3" 2x10 GTB 3" 2x10          TB pulldowns   GTB 3" 2x10  GTB 3" 2x10                                                                           Ther Ex             UBE fwd/retro   4'/4' 4'/4'          Wall slides flex, scapt HEP 5" x10 ea 5"x10 ea          Shoulder IR stretch HEP            Corner pec stretch  3x30"  3x30"          TB shoulder ER  GTB 2x10  GTB 2x10          TB shoulder IR  GTB 2x10  GTB 2x10          Sidelying shoulder ER   2x10                       Pt education HEP, PT POC, anatomy, physiology  HEP           Ther Activity                                       Gait Training                                       Modalities

## 2022-06-29 ENCOUNTER — OFFICE VISIT (OUTPATIENT)
Dept: PHYSICAL THERAPY | Facility: CLINIC | Age: 52
End: 2022-06-29
Payer: COMMERCIAL

## 2022-06-29 DIAGNOSIS — M25.512 LEFT SHOULDER PAIN, UNSPECIFIED CHRONICITY: ICD-10-CM

## 2022-06-29 DIAGNOSIS — R29.898 BILATERAL ARM WEAKNESS: Primary | ICD-10-CM

## 2022-06-29 PROCEDURE — 97140 MANUAL THERAPY 1/> REGIONS: CPT

## 2022-06-29 PROCEDURE — 97110 THERAPEUTIC EXERCISES: CPT

## 2022-06-29 PROCEDURE — 97112 NEUROMUSCULAR REEDUCATION: CPT

## 2022-06-29 NOTE — PROGRESS NOTES
Daily Note     Today's date: 2022  Patient name: Salvador Kim  : 1970  MRN: [id number]  Referring provider: Margurite Cabot, MD  Dx:   Encounter Diagnosis     ICD-10-CM    1  Bilateral arm weakness  R29 898    2  Left shoulder pain, unspecified chronicity  M25 512                   Subjective: Patient reports that her shoulder pain seems to be improving as in the intensity of the pain is lessening and she no longer experiences it when she is working on her computer  She reports that she continues to experience it when lifting heavy objects, especially groceries  Objective: See treatment diary below      Assessment: Tolerated treatment well  Progressed patient with increased reps and added weight as outlined below  Good tolerance to progressions with reports of muscular fatigue, denied increased pain  Much improved PROM, minor limitation noted with shoulder ER  Patient demonstrated fatigue post treatment, exhibited good technique with therapeutic exercises and would benefit from continued PT      Plan: Progress treatment as tolerated         Precautions: anxiety, depression  Access Code: [email or web address]     Manuals 6/8 6/15 6/22 6/29         L shoulder PROM    BE         IASTM to L biceps tendon  BE HA BE                                   Neuro Re-Ed             TB rows  GTB 3" 2x10 GTB 3" 2x10 GTB 3"   3x10          TB pulldowns   GTB 3" 2x10  GTB 3" 2x10 GTB 3"   3x10         Prone Rows    5# 2x10         Prone Y,T,I                                                    Ther Ex             UBE fwd/retro   4'/4' 4'/4' 4'/4'         Wall slides flex, scapt HEP 5" x10 ea 5"x10 ea          Shoulder IR stretch HEP            Corner pec stretch  3x30"  3x30" 3x30"         TB shoulder ER  GTB 2x10  GTB 2x10 GTB 3x10         TB shoulder IR  GTB 2x10  GTB 2x10 GTB 3x10         Sidelying shoulder ER   2x10 3# 2x10                      Pt education HEP, PT POC, anatomy, physiology  HEP           Ther Activity Gait Training                                       Modalities

## 2022-07-06 ENCOUNTER — EVALUATION (OUTPATIENT)
Dept: PHYSICAL THERAPY | Facility: CLINIC | Age: 52
End: 2022-07-06
Payer: COMMERCIAL

## 2022-07-06 DIAGNOSIS — M25.512 LEFT SHOULDER PAIN, UNSPECIFIED CHRONICITY: ICD-10-CM

## 2022-07-06 DIAGNOSIS — R29.898 BILATERAL ARM WEAKNESS: Primary | ICD-10-CM

## 2022-07-06 PROCEDURE — 97110 THERAPEUTIC EXERCISES: CPT

## 2022-07-06 PROCEDURE — 97140 MANUAL THERAPY 1/> REGIONS: CPT

## 2022-07-06 NOTE — PROGRESS NOTES
PT Re-Evaluation     Today's date: 2022  Patient name: Silver Ferrara  : 1970  MRN: [id number]  Referring provider: Swetha Seals MD  Dx:   Encounter Diagnosis     ICD-10-CM    1  Bilateral arm weakness  R29 898    2  Left shoulder pain, unspecified chronicity  M25 512                   Assessment  Assessment details: Silver Ferrara is a 46 y o  female presenting to physical therapy for a reevaluation with a MD referral of bilateral shoulder weakness, left shoulder pain  Since her  initial evaluation, she reports 80% improvement in her shoulder  The patient has demonstrated improved shoulder and cervical active ROM, strength, and decreased pain with functional activities  However, they continue to have decreased shoulder strength leading to limitations in their ability to complete functional lifting activities such as carrying laundry, groceries, reaching behind her back  This patient would benefit from continued PT services to address their impairments and functional limitations to maximize functional outcome  Impairments: abnormal or restricted ROM, activity intolerance, impaired physical strength, lacks appropriate home exercise program and pain with function    Symptom irritability: moderateUnderstanding of Dx/Px/POC: excellent   Prognosis: good    Goals  STG to be achieved in 4 weeks: The patient will report a decrease in left shoulder "at worst" subjective pain rating score to at least 5/10 to allow for improved tolerance for overhead activities  MET  The patient will increase left shoulder flexion AROM to at least 150 degrees to allow for improved functional mobility  MET  The patient will increase left shoulder ER MMT score to at least 4+/5 to allow for improved functional mobility  MET    LTG to be achieved by DC: ALL NOT MET- PROGRESSING  The patient will be independent in comprehensive HEP  The patient will report no pain with usual activities     The patient will improve left shoulder AROM to [facility] to allow for improved functional mobility  The patient will increase left shoulder MMT score to [facility] to allow for improved functional mobility  The patient will be able to lift and carry her laundry basket without pain or difficulty  The patient will be able to lift and carry her purse and computer bag without pain or difficulty  The patient will be able to drive without pain or difficulty  Plan  Patient would benefit from: skilled physical therapy  Planned modality interventions: thermotherapy: hydrocollator packs, TENS and cryotherapy  Planned therapy interventions: manual therapy, joint mobilization, neuromuscular re-education, patient education, flexibility, strengthening, stretching, therapeutic activities, therapeutic exercise and home exercise program  Frequency: 1-2x per week  Duration in weeks: 10  Plan of Care beginning date: 6/8/2022  Plan of Care expiration date: 8/17/2022  Treatment plan discussed with: patient        Subjective Evaluation    History of Present Illness  Mechanism of injury: Abi Cook reports 80% improvement since beginning PT services  She notes improvement in her ability to use her shoulder since initial evaluation  She reports that she is better able to turn the steering wheel when driving without as much pain as she did previously  She reports that she has been using her right arm to blow dry her hair more, however when she uses the left shoulder it doesn't feel as heavy or difficult to do  She notes that she no longer has to take advil 3x per day, she is down to just one dose in the morning  She reports that they have continued difficulty with carrying laundry basket up the steps as well as the carrying groceries up the stairs due to weakness and pain in the shoulder  She notes that she still has to make more trips to be sure that the bags are light enough to carry  She also notes continued difficulties with reaching behind her back to put on her bra  Pain  Current pain ratin  At best pain ratin  At worst pain ratin  Location: biceps on L   Quality: dull ache and throbbing  Relieving factors: medications and rest  Aggravating factors: lifting  Progression: improved    Social Support  Steps to enter house: yes  Stairs in house: yes   Lives in: multiple-level home  Lives with: spouse    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, office work, walking customers through jobs)  Hand dominance: right      Diagnostic Tests  X-ray: abnormal  Treatments  Previous treatment: medication  Current treatment: physical therapy  Patient Goals  Patient goals for therapy: decreased pain, increased strength and independence with ADLs/IADLs  Patient goal: figure out why i have the pain and make it go away         Objective     Tenderness     Left Shoulder   Tenderness in the biceps tendon (proximal) and supraspinatus tendon       Neurological Testing     Sensation   Cervical/Thoracic   Left   Intact: light touch    Right   Intact: light touch    Reflexes   Left   Biceps (C5/C6): normal (2+)  Brachioradialis (C6): normal (2+)  Triceps (C7): normal (2+)    Right   Biceps (C5/C6): normal (2+)  Brachioradialis (C6): normal (2+)  Triceps (C7): normal (2+)    Active Range of Motion   Cervical/Thoracic Spine       Cervical    Flexion: 60 degrees   Extension: 60 degrees      Left lateral flexion: 50 degrees      Right lateral flexion: 50 degrees      Left rotation: 75 degrees  Right rotation: 75 degrees         Left Shoulder   Flexion: 170 degrees   Abduction: 165 degrees   External rotation BTH: T3   Internal rotation BTB: T7 with pain    Right Shoulder   Flexion: WFL  Abduction: WFL  External rotation BTH: T3   Internal rotation BTB: T6   Mechanical Assessment    Cervical    Seated retraction: repeated movements   Pain location: no change  Seated Flexion:  repeated movements  Pain location: no change  Seated Extension: repeated movements  Pain location: no change    Thoracic      Lumbar      Strength/Myotome Testing   Cervical Spine     Left   Interossei strength (t1): 5    Right   Interossei strength (t1): 5    Left Shoulder     Planes of Motion   Flexion: 5   Abduction: 4+ (pain in biceps region)   External rotation at 45°: 4+ (pain in biceps region)   Internal rotation at 45°: 5     Right Shoulder     Planes of Motion   Flexion: 5   Abduction: 4+   External rotation at 45°: 5   Internal rotation at 45°: 5     Left Elbow   Flexion: 4 (pain in biceps region)  Extension: 4+    Right Elbow   Flexion: 4+  Extension: 4+    Left Wrist/Hand   Wrist extension: 5  Wrist flexion: 4 (pain in biceps region)    Right Wrist/Hand   Wrist extension: 5  Wrist flexion: 4+    Tests   Cervical   Negative vertical compression and cervical distraction  Left   Negative Spurling's Test A  Right   Negative Spurling's Test A  Left Shoulder   Positive empty can, external rotation lag sign, full can, Neer's, Speed's and ULTT4  Negative ULTT1 and ULTT3  Right Shoulder   Positive ULTT4  Negative ULTT1 and ULTT3  Lumbar   Negative vertical compression                Precautions: anxiety, depression  Access Code: [email or web address]     Manuals 6/8 6/15 6/22 6/29 7/6        L shoulder PROM    BE BE        IASTM to L biceps tendon  BE HA BE                                   Neuro Re-Ed             TB rows  GTB 3" 2x10 GTB 3" 2x10 GTB 3"   3x10          TB pulldowns   GTB 3" 2x10  GTB 3" 2x10 GTB 3"   3x10         Prone Rows    5# 2x10 5# 3x10        Prone Y,T,I             PNF D2 flex             Body Blade flex a/p, s/l                          Ther Ex             UBE fwd/retro   4'/4' 4'/4' 4'/4' 4'/4'        Wall slides flex, scapt HEP 5" x10 ea 5"x10 ea  DC HEP        Shoulder IR stretch HEP            Corner pec stretch  3x30"  3x30" 3x30"         TB shoulder ER  GTB 2x10  GTB 2x10 GTB 3x10         TB shoulder IR  GTB 2x10  GTB 2x10 GTB 3x10         Sidelying shoulder ER   2x10 3# 2x10 2# 3x10        Standing shoulder 3 ways      3# 2x10        Pt education HEP, PT POC, anatomy, physiology  HEP   Reassessment, HEP review        Ther Activity                                       Gait Training                                       Modalities

## 2022-07-13 ENCOUNTER — APPOINTMENT (OUTPATIENT)
Dept: PHYSICAL THERAPY | Facility: CLINIC | Age: 52
End: 2022-07-13
Payer: COMMERCIAL

## 2022-07-20 ENCOUNTER — OFFICE VISIT (OUTPATIENT)
Dept: BARIATRICS | Facility: CLINIC | Age: 52
End: 2022-07-20

## 2022-07-20 ENCOUNTER — OFFICE VISIT (OUTPATIENT)
Dept: PHYSICAL THERAPY | Facility: CLINIC | Age: 52
End: 2022-07-20
Payer: COMMERCIAL

## 2022-07-20 VITALS — BODY MASS INDEX: 30.25 KG/M2 | WEIGHT: 188.2 LBS | HEIGHT: 66 IN

## 2022-07-20 DIAGNOSIS — R29.898 BILATERAL ARM WEAKNESS: Primary | ICD-10-CM

## 2022-07-20 DIAGNOSIS — R63.5 ABNORMAL WEIGHT GAIN: Primary | ICD-10-CM

## 2022-07-20 DIAGNOSIS — M25.512 LEFT SHOULDER PAIN, UNSPECIFIED CHRONICITY: ICD-10-CM

## 2022-07-20 PROCEDURE — RECHECK

## 2022-07-20 PROCEDURE — 97112 NEUROMUSCULAR REEDUCATION: CPT

## 2022-07-20 PROCEDURE — 97110 THERAPEUTIC EXERCISES: CPT

## 2022-07-20 PROCEDURE — WMPRO12

## 2022-07-20 NOTE — PROGRESS NOTES
Weight Management Medical Nutrition Assessment  Leona Gamez  was here today as a new start in the Healthy Core Program   Today she weighs 188 2 lbs and has a goal weight of 150 - 155  She was previously on VLCD, and is interested in a following a partial plan  She will have 2 meal replacements, 1 bar, 1 low carb meal, and 1 sensible meal         Patient seen by Medical Provider in past 6 months:  yes  Requested to schedule appointment with Medical Provider: No        Anthropometric Measurements  Start Weight (#): 188 2  HC new start on 7/20  Current Weight (#): 188 2  (self reported weight)  TBW % Change from start weight: 0%  Ideal Body Weight (#): 130 lbs  Goal Weight (#): 150 - 155 lbs     Weight Loss History  Previous weight loss attempts: Commercial Programs (Weight Watchers, [place] [employer], etc )  Self Created Diets (Portion Control, Healthy Food Choices, etc )     Food and Nutrition Related History  Wake up: 5:30 - 6:00 am  Bed Time: 10 pm     Food Recall  Breakfast:protein shake (homemade)            Snack:  Lunch: ensure or boost or apples, yogurt and cheese  Snack:fruit or hua's miniatrue  Dinner: chicken or fish, vegetables and starch (pasta or rice) or mashed potatoes  Snack: tbsp PB or flat bread        Beverages: water, coffee, sparki  Volume of beverage intake: 60oz     Weekends: Same  Cravings: sweets  Trouble area of day: night time     Frequency of Eating out: once per week  Food restrictions:none  Cooking: self   Food Shopping: self     Physical Activity Intake  Activity:none currently  Frequency:rarely  Physical limitations/barriers to exercise: none     Estimated Needs  Energy     [facility] Energy Needs:  BMR : 6785   4-6# loss weekly sedentary:  714 - 3921           0-5# loss weekly lightly active:9101 - 2755  Protein:71 - 89     (1 2-1 5g/kg IBW)  Fluid:  68     (35mL/kg IBW)     Nutrition Diagnosis  Yes;    Overweight/obesity  related to Excess energy intake as evidenced by  BMI more than normative standard for age and sex (obesity-grade I 26-30  9)     Nutrition Intervention     Nutrition Prescription  Calories:1000 - 1200  Protein:  71 - 89  Fluid: 68           Nutrition Education:    Healthy Core Manuale  Calorie controlled menu  Lean protein food choices  Healthy snack options  Food journaling tips        Nutrition Counseling:  Strategies: meal planning, portion sizes, healthy snack choices, hydration, fiber intake, protein intake, exercise, food journal        Monitoring and Evaluation:  Evaluation criteria:  Energy Intake  Meet protein needs  Maintain adequate hydration  Monitor weekly weight  Meal planning/preparation  Food journal   Decreased portions at mealtimes and snacks  Physical activity      Barriers to learning:none  Readiness to change: Action:  (Changing behavior)  Comprehension: good  Expected Compliance: good

## 2022-07-20 NOTE — PROGRESS NOTES
Daily Note     Today's date: 2022  Patient name: Trang Bahena  : 1970  MRN: [id number]  Referring provider: Ryanne Rome MD  Dx:   Encounter Diagnosis     ICD-10-CM    1  Bilateral arm weakness  R29 898    2  Left shoulder pain, unspecified chronicity  M25 512        Start Time: 5772  Stop Time: 0853  Total time in clinic (min): 30 minutes    Subjective: Pt noted that her L shoulder is still bothering her more than the R  4/10 L only  No pain in the R        Objective: See treatment diary below    Delay start - apologies to patient  For delay start  Assessment: Continued with treatment session, at this time no additional exercises performed  Progressed weight with ER s/L with 3# with no compensation noted  Pt demonstrated proper scapular setting with all exercises  Minimal verbal cues required  No increase in p! With exercises  Pt declined PROM due to L shoulder ROM is now within normal limits  Tolerated treatment fairly well   Patient exhibited good technique with therapeutic exercises and would benefit from continued PT  S/P treatment session, noted no changes  Instructed With HEP on every other day performance, unless noted otherwise and/or having increased discomfort or pain       DOMS reviewed and acknowledged by patient may have 24 to 48 hours of muscle soreness following treatment session               Plan: Continue per plan of care  Precautions: anxiety, depression  Access Code: [email or web address]     Manuals 6/8 6/15 6/22 6/29 7/6 7/20       L shoulder PROM    BE BE declined   WNL        IASTM to L biceps tendon  BE HA BE                                   Neuro Re-Ed             TB rows  GTB 3" 2x10 GTB 3" 2x10 GTB 3"   3x10          TB pulldowns   GTB 3" 2x10  GTB 3" 2x10 GTB 3"   3x10         Prone Rows    5# 2x10 5# 3x10 5# 3x 10        Prone Y,T,I      NV       PNF D2 flex             Body Blade flex a/p, s/l                          Ther Ex             UBE fwd/retro   4'/4' 4'/4' 4'/4' 4'/4' 4'/4'       Wall slides flex, scapt HEP 5" x10 ea 5"x10 ea  DC HEP        Shoulder IR stretch HEP            Corner pec stretch  3x30"  3x30" 3x30"  3x 30"        TB shoulder ER  GTB 2x10  GTB 2x10 GTB 3x10  NV       TB shoulder IR  GTB 2x10  GTB 2x10 GTB 3x10  NV       Sidelying shoulder ER   2x10 3# 2x10 2# 3x10 3# 3x 10        Standing shoulder 3 ways      3# 2x10 3# 3x 10        Pt education HEP, PT POC, anatomy, physiology  HEP   Reassessment, HEP review        Ther Activity                                       Gait Training                                       Modalities                                              1 on 1 time for 30 minutes on 7/20/22  All other time independent exercises

## 2022-07-27 ENCOUNTER — CLINICAL SUPPORT (OUTPATIENT)
Dept: BARIATRICS | Facility: CLINIC | Age: 52
End: 2022-07-27

## 2022-07-27 ENCOUNTER — OFFICE VISIT (OUTPATIENT)
Dept: PHYSICAL THERAPY | Facility: CLINIC | Age: 52
End: 2022-07-27
Payer: COMMERCIAL

## 2022-07-27 VITALS — HEIGHT: 66 IN | BODY MASS INDEX: 29.73 KG/M2 | WEIGHT: 185 LBS

## 2022-07-27 DIAGNOSIS — R29.898 BILATERAL ARM WEAKNESS: Primary | ICD-10-CM

## 2022-07-27 DIAGNOSIS — R63.5 ABNORMAL WEIGHT GAIN: Primary | ICD-10-CM

## 2022-07-27 DIAGNOSIS — M25.512 LEFT SHOULDER PAIN, UNSPECIFIED CHRONICITY: ICD-10-CM

## 2022-07-27 PROCEDURE — 97110 THERAPEUTIC EXERCISES: CPT | Performed by: PHYSICAL THERAPIST

## 2022-07-27 PROCEDURE — 97112 NEUROMUSCULAR REEDUCATION: CPT | Performed by: PHYSICAL THERAPIST

## 2022-07-27 PROCEDURE — RECHECK

## 2022-07-27 NOTE — PROGRESS NOTES
Daily Note     Today's date: 2022  Patient name: Jung Saunders  : 1970  MRN: [id number]  Referring provider: Mateusz Lam MD  Dx:   Encounter Diagnosis     ICD-10-CM    1  Bilateral arm weakness  R29 898    2  Left shoulder pain, unspecified chronicity  M25 512                   Subjective: Patient without c/o prior to treatment session  Objective: See treatment diary below      Assessment: Patient demonstrated moderate challenge with additions of prone Y's and T's; no c/o at completion of treatment session; updated HEP with prone I's, T's, and Y's  Plan: Continue per plan of care  Precautions: anxiety, depression  Access Code: [email or web address]     Manuals 6/8 6/15 6/22 6/29 7/6 7/20 7/27      L shoulder PROM    BE BE declined  WNL  np      IASTM to L biceps tendon  BE HA BE                                   Neuro Re-Ed             TB rows  GTB 3" 2x10 GTB 3" 2x10 GTB 3"   3x10    BTB 3" 3x10      TB pulldowns   GTB 3" 2x10  GTB 3" 2x10 GTB 3"   3x10         Prone Rows    5# 2x10 5# 3x10 5# 3x 10  5# 3x 10       Prone Y,T,I      NV 3x10 ea        PNF D2 flex             Body Blade flex a/p, s/l                          Ther Ex             UBE fwd/retro   4'/4' 4'/4' 4'/4' 4'/4' 4'/4' 4'/4'      Wall slides flex, scapt HEP 5" x10 ea 5"x10 ea  DC HEP        Shoulder IR stretch HEP            Corner pec stretch  3x30"  3x30" 3x30"  3x 30"  3x 30"       TB shoulder ER  GTB 2x10  GTB 2x10 GTB 3x10  NV BTB 3x10      TB shoulder IR  GTB 2x10  GTB 2x10 GTB 3x10  NV BTB 3x10      Sidelying shoulder ER   2x10 3# 2x10 2# 3x10 3# 3x 10  3# 3x 10       Standing shoulder 3 ways      3# 2x10 3# 3x 10  3# 3x 10       Pt education HEP, PT POC, anatomy, physiology  HEP   Reassessment, HEP review        Ther Activity                                       Gait Training                                       Modalities

## 2022-08-03 ENCOUNTER — CLINICAL SUPPORT (OUTPATIENT)
Dept: BARIATRICS | Facility: CLINIC | Age: 52
End: 2022-08-03

## 2022-08-03 ENCOUNTER — EVALUATION (OUTPATIENT)
Dept: PHYSICAL THERAPY | Facility: CLINIC | Age: 52
End: 2022-08-03

## 2022-08-03 VITALS — WEIGHT: 184.2 LBS | BODY MASS INDEX: 29.6 KG/M2 | HEIGHT: 66 IN

## 2022-08-03 DIAGNOSIS — M25.512 LEFT SHOULDER PAIN, UNSPECIFIED CHRONICITY: ICD-10-CM

## 2022-08-03 DIAGNOSIS — R63.5 ABNORMAL WEIGHT GAIN: Primary | ICD-10-CM

## 2022-08-03 DIAGNOSIS — R29.898 BILATERAL ARM WEAKNESS: Primary | ICD-10-CM

## 2022-08-03 PROCEDURE — 97110 THERAPEUTIC EXERCISES: CPT

## 2022-08-03 PROCEDURE — RECHECK

## 2022-08-03 NOTE — PROGRESS NOTES
PT Discharge    Today's date: 8/3/2022  Patient name: Barbara Mas  : 1970  MRN: [id number]  Referring provider: Loreta Tobias MD  Dx:   Encounter Diagnosis     ICD-10-CM    1  Bilateral arm weakness  R29 898    2  Left shoulder pain, unspecified chronicity  M25 512                   Assessment  Assessment details: Barbara Mas is a 46 y o  female presenting to physical therapy for a reevaluation with a MD referral of bilateral shoulder weakness, left shoulder pain  Since her  initial evaluation, she reports 100% improvement in her shoulder  She demonstrates WNL shoulder and cervical ROM, significantly improved shoulder strength, and denies any pain with functional activities  She is able to complete all functional activities including lifting and carrying groceries, laundry, reaching behind her back, and yardwork without difficulty  She has met her goals, is independent with completing her HEP, and will be DC to a comprehensive HEP at this time  She was educated at length regarding current HEP and ways to self progress with weights and resistances at home  She verbalized understanding the above education  Understanding of Dx/Px/POC: excellent   Prognosis: good    Goals  STG to be achieved in 4 weeks: The patient will report a decrease in left shoulder "at worst" subjective pain rating score to at least 5/10 to allow for improved tolerance for overhead activities  MET  The patient will increase left shoulder flexion AROM to at least 150 degrees to allow for improved functional mobility  MET  The patient will increase left shoulder ER MMT score to at least 4+/5 to allow for improved functional mobility  MET    LTG to be achieved by DC: The patient will be independent in comprehensive HEP  MET  The patient will report no pain with usual activities  MET  The patient will improve left shoulder AROM to [facility] to allow for improved functional mobility   MET  The patient will increase left shoulder MMT score to [facility] to allow for improved functional mobility  MET  The patient will be able to lift and carry her laundry basket without pain or difficulty  MET  The patient will be able to lift and carry her purse and computer bag without pain or difficulty  MET  The patient will be able to drive without pain or difficulty  MET      Plan  Plan details: DC to comprehensive HEP  Plan of Care beginning date: 8/3/2022  Plan of Care expiration date: 8/3/2022  Treatment plan discussed with: patient        Subjective Evaluation    History of Present Illness  Mechanism of injury: Leona Gamez reports 100% improvement since beginning PT services  She notes improvement in her ability to use her shoulder since initial evaluation  She reports that she hasn't been experiencing any pain in the shoulder which is a huge improvement  She reports that she is able to carry her laundry basket and groceries up her stairs without difficulty  She reports that she is able to put her bra on like normal without any limitations  She has also been doing various yardwork with tools, mowing, etc and hasn't had trouble with any of these activities             Pain  Current pain ratin  At best pain ratin  At worst pain ratin  Relieving factors: medications and rest  Progression: improved    Social Support  Steps to enter house: yes  Stairs in house: yes   Lives in: multiple-level home  Lives with: spouse    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, office work, walking customers through jobs)  Hand dominance: right      Diagnostic Tests  X-ray: abnormal  Treatments  Previous treatment: medication  Current treatment: physical therapy  Patient Goals  Patient goals for therapy: decreased pain, increased strength and independence with ADLs/IADLs  Patient goal: figure out why i have the pain and make it go away         Objective     Neurological Testing     Sensation   Cervical/Thoracic   Left   Intact: light touch    Right Intact: light touch    Reflexes   Left   Biceps (C5/C6): normal (2+)  Brachioradialis (C6): normal (2+)  Triceps (C7): normal (2+)    Right   Biceps (C5/C6): normal (2+)  Brachioradialis (C6): normal (2+)  Triceps (C7): normal (2+)    Active Range of Motion   Cervical/Thoracic Spine       Cervical    Flexion: 60 degrees   Extension: 60 degrees      Left lateral flexion: 50 degrees      Right lateral flexion: 50 degrees      Left rotation: 75 degrees  Right rotation: 75 degrees         Left Shoulder   Flexion: WFL  Abduction: WFL  External rotation BTH: T3   Internal rotation BTB: T6     Right Shoulder   Flexion: WFL  Abduction: WFL  External rotation BTH: T3   Internal rotation BTB: T6   Mechanical Assessment    Cervical    Seated retraction: repeated movements   Pain location: no change  Seated Flexion:  repeated movements  Pain location: no change  Seated Extension: repeated movements  Pain location: no change    Thoracic      Lumbar      Strength/Myotome Testing   Cervical Spine     Left   Interossei strength (t1): 5    Right   Interossei strength (t1): 5    Left Shoulder     Planes of Motion   Flexion: 5   Abduction: 5   External rotation at 45°: 5   Internal rotation at 45°: 5     Right Shoulder     Planes of Motion   Flexion: 5   Abduction: 5   External rotation at 45°: 5   Internal rotation at 45°: 5     Left Elbow   Flexion: 5  Extension: 5    Right Elbow   Flexion: 5  Extension: 5    Left Wrist/Hand   Wrist extension: 5  Wrist flexion: 5    Right Wrist/Hand   Wrist extension: 5  Wrist flexion: 5    Tests   Cervical   Negative vertical compression and cervical distraction  Left   Negative Spurling's Test A  Right   Negative Spurling's Test A  Left Shoulder   Positive empty can, external rotation lag sign, full can, Neer's and Speed's  Lumbar   Negative vertical compression                Precautions: anxiety, depression  Access Code: [facility]     Manuals 6/8 6/15 6/22 6/29 7/6 7/20 7/27 8/3 L shoulder PROM    BE BE declined  WNL  np      IASTM to L biceps tendon  BE HA BE                                   Neuro Re-Ed             TB rows  GTB 3" 2x10 GTB 3" 2x10 GTB 3"   3x10    BTB 3" 3x10      TB pulldowns   GTB 3" 2x10  GTB 3" 2x10 GTB 3"   3x10         Prone Rows    5# 2x10 5# 3x10 5# 3x 10  5# 3x 10       Prone Y,T,I      NV 3x10 ea        PNF D2 flex             Body Blade flex a/p, s/l                          Ther Ex             UBE fwd/retro   4'/4' 4'/4' 4'/4' 4'/4' 4'/4' 4'/4' 4'/4'     Wall slides flex, scapt HEP 5" x10 ea 5"x10 ea  DC HEP        Shoulder IR stretch HEP            Corner pec stretch  3x30"  3x30" 3x30"  3x 30"  3x 30"       TB shoulder ER  GTB 2x10  GTB 2x10 GTB 3x10  NV BTB 3x10      TB shoulder IR  GTB 2x10  GTB 2x10 GTB 3x10  NV BTB 3x10      Sidelying shoulder ER   2x10 3# 2x10 2# 3x10 3# 3x 10  3# 3x 10       Standing shoulder 3 ways      3# 2x10 3# 3x 10  3# 3x 10       Pt education HEP, PT POC, anatomy, physiology  HEP   Reassessment, HEP review   reassessment, HEP review, progressions     Ther Activity                                       Gait Training                                       Modalities

## 2022-08-10 ENCOUNTER — CLINICAL SUPPORT (OUTPATIENT)
Dept: BARIATRICS | Facility: CLINIC | Age: 52
End: 2022-08-10

## 2022-08-10 ENCOUNTER — OFFICE VISIT (OUTPATIENT)
Dept: BARIATRICS | Facility: CLINIC | Age: 52
End: 2022-08-10

## 2022-08-10 VITALS — BODY MASS INDEX: 29.92 KG/M2 | HEIGHT: 66 IN | WEIGHT: 186.2 LBS

## 2022-08-10 DIAGNOSIS — R63.5 ABNORMAL WEIGHT GAIN: Primary | ICD-10-CM

## 2022-08-10 PROCEDURE — RECHECK

## 2022-08-10 NOTE — PROGRESS NOTES
Weight Management Medical Nutrition Assessment  Yolanda  was here today for her 2 week in the [email or web address] [facility] Program   Today she weighs 186 2 lbs giving her a loss fo 2 lbs since her last appt, but a 2 lb gain since her class last week  She is food logging, and walking regularly  Reviewed her carbs, protein and calories  She admits that she is frustrated, and reports that this seems to be her "pattern of weight loss " Encouraged her to continue logging (weighing/measuring) and to increase her walking and add some strength training  Next week I will be having her take the REE metabolism test just to rule that out  She may want to see the provider as well        Patient seen by Medical Provider in past 6 months:  yes  Requested to schedule appointment with Medical Provider: No        Anthropometric Measurements  Start Weight (#): 188 2  HC new start on 7/20  Current Weight (#): 186 2    TBW % Change from start weight: 1%  Ideal Body Weight (#): 130 lbs  Goal Weight (#): 150 - 155 lbs     Weight Loss History  Previous weight loss attempts: Commercial Programs (Weight Watchers, [employer], etc )  Self Created Diets (Portion Control, Healthy Food Choices, etc )     Food and Nutrition Related History  Wake up: 5:30 - 6:00 am  Bed Time: 10 pm     Food Recall  Breakfast:protein shake (homemade)    (220 - 396)        Snack:  Lunch:  Meal replacement  (200)  Snack:fruit or cheese  Dinner: chicken or fish, vegetables and starch (pasta or rice) or mashed potatoes  Snack: tbsp PB or flat bread or bar        Beverages: water, coffee, sparkling water  Volume of beverage intake: 60oz     Weekends: Same  Cravings: sweets  Trouble area of day: night time     Frequency of Eating out: once per week  Food restrictions:none  Cooking: self   Food Shopping: self     Physical Activity Intake  Activity:walking  Frequency:most days  Physical limitations/barriers to exercise: none     Estimated Needs  Energy     [employer] Needs: BMR : 5405   1-4# loss weekly sedentary:  598 - 0267           1-2# loss weekly lightly active:5726 - 5692  Protein:71 - 89     (1 2-1 5g/kg IBW)  Fluid:  68     (35mL/kg IBW)     Nutrition Diagnosis  Yes;    Overweight/obesity  related to Excess energy intake as evidenced by  BMI more than normative standard for age and sex (obesity-grade I 30-34  9)     Nutrition Intervention     Nutrition Prescription  Calories:1000 - 1200  Protein:  71 - 89  Fluid: 68        Nutrition Education:    Healthy Core Manual  Calorie controlled menu  Lean protein food choices  Healthy snack options  Food journaling tips        Nutrition Counseling:  Strategies: meal planning, portion sizes, healthy snack choices, hydration, fiber intake, protein intake, exercise, food journal        Monitoring and Evaluation:  Evaluation criteria:  Energy Intake  Meet protein needs  Maintain adequate hydration  Monitor weekly weight  Meal planning/preparation  Food journal   Decreased portions at mealtimes and snacks  Physical activity      Barriers to learning:none  Readiness to change: Action:  (Changing behavior)  Comprehension: good  Expected Compliance: good

## 2022-08-16 ENCOUNTER — OFFICE VISIT (OUTPATIENT)
Dept: BARIATRICS | Facility: CLINIC | Age: 52
End: 2022-08-16

## 2022-08-16 VITALS — BODY MASS INDEX: 29.99 KG/M2 | HEIGHT: 66 IN | WEIGHT: 186.6 LBS

## 2022-08-16 DIAGNOSIS — R63.5 ABNORMAL WEIGHT GAIN: Primary | ICD-10-CM

## 2022-08-16 PROCEDURE — RECHECK

## 2022-08-16 NOTE — PROGRESS NOTES
Weight Management Medical Nutrition Assessment  Yolanda  was here today for her REE test in the Healthy Core Program  Rey Favorite she is maintaining her weight at 186 2  She is frustrated since she seems to be "stuck" at 186 donato  She is food logging, and walking regularly  Reviewed her carbs, protein and calories  Encouraged her to continue logging (weighing/measuring) and to increase her walking and add some strength training  Reviewed the results of her test - Reevue indirect calorimter revealed REE is fast (61%)       compared to the predictive normal for someone her same age, height, and gender  Please note she did have a cup of coffee at 5 am, and the test was not until 8:15 so it should not effect her result  (we recommend fasting for 3 - 4 hours before that REE test )  She wanted to see Dr Rip Hui, and is possibly interested in medication if appropriate       Patient seen by Medical Provider in past 6 months:  yes  Requested to schedule appointment with Medical Provider: No        Anthropometric Measurements  Start Weight (#): 188  2 [address] [facility] new start on 7/20  Current Weight (#): 186 2    TBW % Change from start weight: 1%  Ideal Body Weight (#): 130 lbs  Goal Weight (#): 150 - 155 lbs     Weight Loss History  Previous weight loss attempts: Commercial Programs (Weight Watchers, [employer], etc )  Self Created Diets (Portion Control, Healthy Food Choices, etc )     Food and Nutrition Related History  Wake up: 5:30 - 6:00 am  Bed Time: 10 pm     Food Recall  Breakfast:protein shake (homemade)    (220 - 396)        Snack:  Lunch:  Meal replacement  (200)  Snack:fruit or cheese  Dinner: chicken or fish, vegetables and starch (pasta or rice) or mashed potatoes  Snack: tbsp PB or flat bread or bar        Beverages: water, coffee, sparkling water  Volume of beverage intake: 60oz     Weekends: Same  Cravings: sweets  Trouble area of day: night time     Frequency of Eating out: once per week  Food restrictions:none  Cooking: self   Food Shopping: self     Physical Activity Intake  Activity:walking  Frequency:most days  Physical limitations/barriers to exercise: none     Estimated Needs  Energy     [employer] Energy Needs: BMR : 4300   2-1# loss weekly sedentary:  784 - 1284           1-2# loss weekly lightly active:1044 - 1544  Protein:71 - 89     (1 2-1 5g/kg IBW)  Fluid:  68     (35mL/kg IBW)     Nutrition Diagnosis  Yes;    Overweight/obesity  related to Excess energy intake as evidenced by  BMI more than normative standard for age and sex (obesity-grade I 30-34  9)     Nutrition Intervention     Nutrition Prescription  Calories:1000 - 1200  Protein:  14 - 80  Fluid: 68        Nutrition Education:    Healthy Core Manual  Calorie controlled menu  Lean protein food choices  Healthy snack options  Food journaling tips        Nutrition Counseling:  Strategies: meal planning, portion sizes, healthy snack choices, hydration, fiber intake, protein intake, exercise, food journal        Monitoring and Evaluation:  Evaluation criteria:  Energy Intake  Meet protein needs  Maintain adequate hydration  Monitor weekly weight  Meal planning/preparation  Food journal   Decreased portions at mealtimes and snacks  Physical activity      Barriers to learning:none  Readiness to change: Action:  (Changing behavior)  Comprehension: good  Expected Compliance: good

## 2022-08-17 ENCOUNTER — CLINICAL SUPPORT (OUTPATIENT)
Dept: BARIATRICS | Facility: CLINIC | Age: 52
End: 2022-08-17

## 2022-08-17 VITALS — BODY MASS INDEX: 29.97 KG/M2 | HEIGHT: 66 IN | WEIGHT: 186.5 LBS

## 2022-08-17 DIAGNOSIS — R63.5 ABNORMAL WEIGHT GAIN: Primary | ICD-10-CM

## 2022-08-17 PROCEDURE — RECHECK

## 2022-08-24 ENCOUNTER — CLINICAL SUPPORT (OUTPATIENT)
Dept: BARIATRICS | Facility: CLINIC | Age: 52
End: 2022-08-24

## 2022-08-24 VITALS — WEIGHT: 187.4 LBS | HEIGHT: 66 IN | BODY MASS INDEX: 30.12 KG/M2

## 2022-08-24 DIAGNOSIS — R63.5 ABNORMAL WEIGHT GAIN: Primary | ICD-10-CM

## 2022-08-25 ENCOUNTER — TELEPHONE (OUTPATIENT)
Dept: PAIN MEDICINE | Facility: CLINIC | Age: 52
End: 2022-08-25

## 2022-08-25 ENCOUNTER — OFFICE VISIT (OUTPATIENT)
Dept: BARIATRICS | Facility: CLINIC | Age: 52
End: 2022-08-25

## 2022-08-25 VITALS
BODY MASS INDEX: 29.83 KG/M2 | SYSTOLIC BLOOD PRESSURE: 110 MMHG | TEMPERATURE: 98.2 F | DIASTOLIC BLOOD PRESSURE: 80 MMHG | WEIGHT: 185.6 LBS | HEIGHT: 66 IN | HEART RATE: 67 BPM | RESPIRATION RATE: 14 BRPM

## 2022-08-25 DIAGNOSIS — E66.09 CLASS 1 OBESITY DUE TO EXCESS CALORIES WITH SERIOUS COMORBIDITY AND BODY MASS INDEX (BMI) OF 30.0 TO 30.9 IN ADULT: Primary | ICD-10-CM

## 2022-08-25 DIAGNOSIS — E78.00 PURE HYPERCHOLESTEROLEMIA: ICD-10-CM

## 2022-08-25 DIAGNOSIS — I10 PRIMARY HYPERTENSION: ICD-10-CM

## 2022-08-25 PROBLEM — K21.9 GASTROESOPHAGEAL REFLUX DISEASE WITHOUT ESOPHAGITIS: Status: ACTIVE | Noted: 2022-08-25

## 2022-08-25 PROCEDURE — 99213 OFFICE O/P EST LOW 20 MIN: CPT | Performed by: FAMILY MEDICINE

## 2022-08-25 RX ORDER — PHENTERMINE HYDROCHLORIDE 15 MG/1
15 CAPSULE ORAL EVERY MORNING
Qty: 30 CAPSULE | Refills: 1 | Status: SHIPPED | OUTPATIENT
Start: 2022-08-25 | End: 2022-09-28 | Stop reason: SDUPTHER

## 2022-08-25 NOTE — PROGRESS NOTES
Assessment/Plan:  Ferdinand Nava was seen today for follow-up  Diagnoses and all orders for this visit:    Class 1 obesity due to excess calories with serious comorbidity and body mass index (BMI) of 30 0 to 30 9 in adult  BP controlled  Renal liver fc TSH normal as of 5/22  -     phentermine 15 MG capsule; Take 1 capsule (15 mg total) by mouth every morning  1200kcal goal  Phentermine has as potential side effects of increased heart rate, increased blood pressure, palpitations, headache, dizziness, insomnia, altered mood, abdominal upset, and dry mouth  Notify the provider with change in mood  ER with SI/HI  Phentermine should be stopped prior to surgery  Notify the provider with any changes in vision  It is contraindicated in pregnancy and lactation period due to potential harm to the fetus  She cannot pay for GLP-1 now   Pure hypercholesterolemia  Weight loss will improve HDL and TG panel  Advised decrease saturated fats and increase omega3 fat content of meals  Should consider statins but she would like to hold off on that  Primary hypertension  No meds for now  Advised caution when using Phentermine  Check BP at home stop medicine if BP above 140/90   Grief reaction: after sudden loss of her 27yo son coping better , emotional support given   -Patient following conservative program  Calorie goal: 1200kcal   -Encourage mindful eating, portion control, motivational interview performed to help patient reach goals   -patient noted changes to work on until next visit  -Initial weight:188 2 [facility] new start on 7/20  -Current weight :185lbs 8/25/22  Start Phentermine today  Change in weight:-3lbs  Goal weight 155lbs    Total time spent: 30 minutes with >50% face-to-face time with the patient  Follow up in approximately 1 month with Non-Surgical Physician/Advanced Practitioner      Subjective:   Chief Complaint   Patient presents with    Follow-up       Patient ID: Alexa Hernandez  is a 45 y o  female with excess weight/obesity here to pursue weight management  Patient is pursuing [facility]    Most recent notes and records were reviewed  HPI  -Initial weight loss goal of 5-10% weight loss for improved health  Wt Readings from Last 10 Encounters:   08/25/22 84 2 kg (185 lb 9 6 oz)   08/24/22 85 kg (187 lb 6 4 oz)   08/17/22 84 6 kg (186 lb 8 oz)   08/16/22 84 6 kg (186 lb 9 6 oz)   08/10/22 84 5 kg (186 lb 3 2 oz)   08/03/22 83 6 kg (184 lb 3 2 oz)   07/27/22 83 9 kg (185 lb)   07/20/22 85 4 kg (188 lb 3 2 oz)   05/27/22 84 5 kg (186 lb 3 2 oz)   03/30/22 85 6 kg (188 lb 12 8 oz)       Food logging: yes 1200-1300kcal   Increased appetite/cravings:only in the evening  Using shakes am and lunch and only dinner she eats food  Fruit/Vegetable servings:  Exercise: 5 days a week and 3 times a week weight lifting   Hydration: 8 glasses   Sleep: fair        The following portions of the patient's history were reviewed and updated as appropriate: allergies, current medications, past family history, past medical history, past social history, past surgical history, and problem list       Review of Systems   Constitutional: Negative for activity change  Fatigue  HENT: Negative for trouble swallowing  Respiratory: Negative for shortness of breath  Cardiovascular: Negative for chest pain, edema  Gastrointestinal: Negative for abdominal pain, nausea and vomiting, acid reflux, constipation/diarrhea  Endocrine: negative for heat /cold intolerance  Genitourinary: Negative for difficulty urinating  Musculoskeletal: Negative for gait problem and myalgias  Psychiatric/Behavioral: Negative for behavioral problems       Objective:  /80 (BP Location: Left arm, Patient Position: Sitting, Cuff Size: Large)   Pulse 67   Temp 98 2 °F (36 8 °C) (Tympanic)   Resp 14   Ht 5' 5 5" (1 664 m)   Wt 84 2 kg (185 lb 9 6 oz)   BMI 30 42 kg/m²   Constitutional: Well-developed, well-nourished and Obese Body mass index is 30 42 kg/m²  [facility] HEENT: No conjunctival injection  No thyroid masses  Pulmonary: No increased work of breathing or signs of respiratory distress  Clear respiratory sounds  CV: Well-perfused, Regular rate and rhythm, no murmurs  GI: Obese  Non-distended  MSK: No edema   Neuro: Oriented to person, place and time  Normal Speech  Normal gait  Psych: Normal affect and mood  Grief reaction  No delusion or hallucinations, normal thought process anxiety and depression resolved    Labs and Imaging  Recent labs and imaging have been personally reviewed    Lab Results   Component Value Date    WBC 5 42 07/30/2021    HGB 14 4 07/30/2021    HCT 42 2 07/30/2021    MCV 93 07/30/2021     07/30/2021     Lab Results   Component Value Date     04/06/2017    SODIUM 140 02/17/2022    K 3 9 02/17/2022     02/17/2022    CO2 31 02/17/2022    ANIONGAP 7 08/13/2015    AGAP 5 02/17/2022    BUN 19 02/17/2022    CREATININE 0 95 02/17/2022    GLUC 99 08/04/2021    GLUF 91 02/17/2022    CALCIUM 9 5 02/17/2022    AST 17 09/18/2020    ALT 30 09/18/2020    ALKPHOS 66 09/18/2020    PROT 6 7 04/06/2017    TP 7 8 09/18/2020    BILITOT 0 6 04/06/2017    TBILI 0 40 09/18/2020    EGFR 68 02/17/2022     Lab Results   Component Value Date    HGBA1C 5 2 08/20/2021     Lab Results   Component Value Date    YPW6XNHLRYPD 2 390 05/31/2022     Lab Results   Component Value Date    CHOLESTEROL 283 (H) 02/10/2022     Lab Results   Component Value Date    HDL 69 02/10/2022     Lab Results   Component Value Date    TRIG 132 02/10/2022     Lab Results   Component Value Date    LDLCALC 188 (H) 02/10/2022

## 2022-08-31 ENCOUNTER — CLINICAL SUPPORT (OUTPATIENT)
Dept: BARIATRICS | Facility: CLINIC | Age: 52
End: 2022-08-31

## 2022-08-31 VITALS — HEIGHT: 66 IN | WEIGHT: 184.8 LBS | BODY MASS INDEX: 29.7 KG/M2

## 2022-08-31 DIAGNOSIS — R63.5 ABNORMAL WEIGHT GAIN: Primary | ICD-10-CM

## 2022-09-14 ENCOUNTER — CLINICAL SUPPORT (OUTPATIENT)
Dept: BARIATRICS | Facility: CLINIC | Age: 52
End: 2022-09-14

## 2022-09-14 VITALS — HEIGHT: 66 IN | BODY MASS INDEX: 29.38 KG/M2 | WEIGHT: 182.8 LBS

## 2022-09-14 DIAGNOSIS — R63.5 ABNORMAL WEIGHT GAIN: Primary | ICD-10-CM

## 2022-09-21 ENCOUNTER — CLINICAL SUPPORT (OUTPATIENT)
Dept: BARIATRICS | Facility: CLINIC | Age: 52
End: 2022-09-21

## 2022-09-21 VITALS — HEIGHT: 66 IN | WEIGHT: 180 LBS | BODY MASS INDEX: 28.93 KG/M2

## 2022-09-21 DIAGNOSIS — R63.5 ABNORMAL WEIGHT GAIN: Primary | ICD-10-CM

## 2022-09-23 ENCOUNTER — TELEPHONE (OUTPATIENT)
Dept: ADMINISTRATIVE | Facility: OTHER | Age: 52
End: 2022-09-23

## 2022-09-23 NOTE — TELEPHONE ENCOUNTER
Upon review of the In Basket request we were able to note that no further action is required  The patient chart is up to date as a result of a previous request       Any additional questions or concerns should be emailed to the Practice Liaisons via [email or web address] email, please do not reply via In Basket      Thank you  Obinna Velasco MA

## 2022-09-23 NOTE — TELEPHONE ENCOUNTER
----- Message from Lucy Nice MA sent at 9/22/2022  2:30 PM EDT -----  Regarding: Care Gap Request  09/22/22 2:30 PM    Hello, our patient Natacha Lam has had Mammogram completed/performed  Please assist in updating the patient chart by pulling the Care Everywhere (CE) document  The date of service is 9/20/2022       Thank you,  GAYLA Senior PG

## 2022-09-28 ENCOUNTER — OFFICE VISIT (OUTPATIENT)
Dept: BARIATRICS | Facility: CLINIC | Age: 52
End: 2022-09-28
Payer: COMMERCIAL

## 2022-09-28 ENCOUNTER — CLINICAL SUPPORT (OUTPATIENT)
Dept: BARIATRICS | Facility: CLINIC | Age: 52
End: 2022-09-28

## 2022-09-28 VITALS
WEIGHT: 181.4 LBS | RESPIRATION RATE: 14 BRPM | DIASTOLIC BLOOD PRESSURE: 78 MMHG | BODY MASS INDEX: 29.15 KG/M2 | TEMPERATURE: 97.8 F | SYSTOLIC BLOOD PRESSURE: 116 MMHG | HEART RATE: 63 BPM | HEIGHT: 66 IN

## 2022-09-28 VITALS — BODY MASS INDEX: 29.18 KG/M2 | WEIGHT: 181.6 LBS | HEIGHT: 66 IN

## 2022-09-28 DIAGNOSIS — R63.5 ABNORMAL WEIGHT GAIN: Primary | ICD-10-CM

## 2022-09-28 DIAGNOSIS — E78.00 PURE HYPERCHOLESTEROLEMIA: Primary | ICD-10-CM

## 2022-09-28 DIAGNOSIS — E66.09 CLASS 1 OBESITY DUE TO EXCESS CALORIES WITH SERIOUS COMORBIDITY AND BODY MASS INDEX (BMI) OF 30.0 TO 30.9 IN ADULT: ICD-10-CM

## 2022-09-28 PROCEDURE — 3074F SYST BP LT 130 MM HG: CPT | Performed by: FAMILY MEDICINE

## 2022-09-28 PROCEDURE — 3078F DIAST BP <80 MM HG: CPT | Performed by: FAMILY MEDICINE

## 2022-09-28 PROCEDURE — 99214 OFFICE O/P EST MOD 30 MIN: CPT | Performed by: FAMILY MEDICINE

## 2022-09-28 RX ORDER — PHENTERMINE HYDROCHLORIDE 15 MG/1
15 CAPSULE ORAL EVERY MORNING
Qty: 30 CAPSULE | Refills: 1 | Status: SHIPPED | OUTPATIENT
Start: 2022-09-25 | End: 2022-10-25

## 2022-09-28 NOTE — PROGRESS NOTES
Assessment/Plan:  Poonam Arvizu was seen today for follow-up  Diagnoses and all orders for this visit:    Pure hypercholesterolemia  Will plan to recheck after weight loss in 2 mo levels of LDL that were very elevated  She did not want to consider statin therapy  continue low caloric low fat diet  Class 1 obesity due to excess calories with serious comorbidity and body mass index (BMI) of 30 0 to 30 9 in adult  -     phentermine 15 MG capsule; Take 1 capsule (15 mg total) by mouth every morning       Nutrition prescription:  Calorie goal: 1200kcal   Safe to continue Phentermine low dose 15 mg   Encourage mindful eating, portion control, motivational interview performed to help patient reach goals   Patient denies personal and family history of  pancreatitis, thyroid cancer, MEN-2 tumors  Denies any hx of glaucoma, seizures, kidney stones, gallstones  Denies Hx of CAD, PAD, palpitations, arrhythmia  Denies uncontrolled anxiety or depression, insomnia or sleep disturbance  Grief reaction after her son passed   Follow up in approximately 2 mo      Subjective:   Chief Complaint   Patient presents with    Follow-up     Patient here to discuss weight associated problems and nutrition goals  HPI: Mary Dominique  is a 45 y o  female with excess weight/obesity here to pursue weight management  Patient is pursuing Conservative Program    Most recent notes and records were reviewed    Initial weight loss goal of 5-10% weight loss for improved health  Wt Readings from Last 10 Encounters:   09/28/22 82 3 kg (181 lb 6 4 oz)   09/28/22 82 4 kg (181 lb 9 6 oz)   09/21/22 81 6 kg (180 lb)   09/14/22 82 9 kg (182 lb 12 8 oz)   08/31/22 83 8 kg (184 lb 12 8 oz)   08/25/22 84 2 kg (185 lb 9 6 oz)   08/24/22 85 kg (187 lb 6 4 oz)   08/17/22 84 6 kg (186 lb 8 oz)   08/16/22 84 6 kg (186 lb 9 6 oz)   08/10/22 84 5 kg (186 lb 3 2 oz)       Initial weight:188 2 [address] [facility] [address] new start on 7/20  Last OV weight :185lbs 8/25/22  Started Phentermine No side effects  Current weight : 181 lbs lost 4 lbs    Food logging: yes stays between 1200-1400kcal  Increased appetite/cravings:none  B:protein shake  L:pudding protein   D: chicken and tomatoes pepper onions   Snacks:hummus , apple   Hydration: 45-55oz  Alcohol: none  Sleep: ok  Had mild constipation but fixed with Metamucil       The following portions of the patient's history were reviewed and updated as appropriate: allergies, current medications, past family history, past medical history, past social history, past surgical history, and problem list       Review of Systems   Constitutional: Negative for activity change  Fatigue  HENT: Negative for trouble swallowing  Respiratory: Negative for shortness of breath  Cardiovascular: Negative for chest pain, edema  Gastrointestinal: Negative for abdominal pain, nausea and vomiting, acid reflux, constipation/diarrhea  Psychiatric/Behavioral: Negative for behavioral problems , anxiety or depression    Objective:  /78 (BP Location: Left arm, Patient Position: Sitting, Cuff Size: Standard)   Pulse 63   Temp 97 8 °F (36 6 °C) (Tympanic)   Resp 14   Ht 5' 5 51" (1 664 m)   Wt 82 3 kg (181 lb 6 4 oz)   BMI 29 72 kg/m²   Constitutional: Well-developed, well-nourished and Overweight Body mass index is 29 72 kg/m²  [facility] HEENT: No conjunctival injection  No thyroid masses  Pulmonary: No increased work of breathing or signs of respiratory distress  Clear respiratory sounds  CV: Well-perfused, Regular rate and rhythm, no murmurs, no edema  GI: increased abdominal girth  Non-distended  Not tender   Endo: no ophthalmopathy, no dermopathy, truncal obesity  MSK: no sarcopenia  Neuro: Oriented to person, place and time  Normal Speech  Normal gait  Psych: Normal affect and mood  No delusion or hallucinations, normal thought process    Labs and Imaging  Recent labs and imaging have been personally reviewed    Lab Results   Component Value Date    WBC 5 42 07/30/2021 HGB 14 4 07/30/2021    HCT 42 2 07/30/2021    MCV 93 07/30/2021     07/30/2021     Lab Results   Component Value Date     04/06/2017    SODIUM 140 02/17/2022    K 3 9 02/17/2022     02/17/2022    CO2 31 02/17/2022    ANIONGAP 7 08/13/2015    AGAP 5 02/17/2022    BUN 19 02/17/2022    CREATININE 0 95 02/17/2022    GLUC 99 08/04/2021    GLUF 91 02/17/2022    CALCIUM 9 5 02/17/2022    AST 17 09/18/2020    ALT 30 09/18/2020    ALKPHOS 66 09/18/2020    PROT 6 7 04/06/2017    TP 7 8 09/18/2020    BILITOT 0 6 04/06/2017    TBILI 0 40 09/18/2020    EGFR 68 02/17/2022     Lab Results   Component Value Date    HGBA1C 5 2 08/20/2021     Lab Results   Component Value Date    HUK8BYOVTLEZ 2 390 05/31/2022     Lab Results   Component Value Date    CHOLESTEROL 283 (H) 02/10/2022     Lab Results   Component Value Date    HDL 69 02/10/2022     Lab Results   Component Value Date    TRIG 132 02/10/2022     Lab Results   Component Value Date    LDLCALC 188 (H) 02/10/2022

## 2022-10-12 ENCOUNTER — CLINICAL SUPPORT (OUTPATIENT)
Dept: BARIATRICS | Facility: CLINIC | Age: 52
End: 2022-10-12

## 2022-10-12 VITALS — HEIGHT: 66 IN | WEIGHT: 179.6 LBS | BODY MASS INDEX: 28.87 KG/M2

## 2022-10-12 DIAGNOSIS — R63.5 ABNORMAL WEIGHT GAIN: Primary | ICD-10-CM

## 2022-10-12 PROCEDURE — RECHECK

## 2022-10-26 ENCOUNTER — CLINICAL SUPPORT (OUTPATIENT)
Dept: BARIATRICS | Facility: CLINIC | Age: 52
End: 2022-10-26

## 2022-10-26 VITALS — BODY MASS INDEX: 28.45 KG/M2 | HEIGHT: 66 IN | WEIGHT: 177 LBS

## 2022-10-26 DIAGNOSIS — R63.5 ABNORMAL WEIGHT GAIN: Primary | ICD-10-CM

## 2022-11-02 ENCOUNTER — OFFICE VISIT (OUTPATIENT)
Dept: BARIATRICS | Facility: CLINIC | Age: 52
End: 2022-11-02

## 2022-11-02 ENCOUNTER — CLINICAL SUPPORT (OUTPATIENT)
Dept: BARIATRICS | Facility: CLINIC | Age: 52
End: 2022-11-02

## 2022-11-02 VITALS — HEIGHT: 66 IN | WEIGHT: 176.4 LBS | BODY MASS INDEX: 28.35 KG/M2

## 2022-11-02 DIAGNOSIS — R63.5 ABNORMAL WEIGHT GAIN: Primary | ICD-10-CM

## 2022-11-02 NOTE — PROGRESS NOTES
Weight Management Medical Nutrition Assessment  Yolanda  was here today for her REE test in the [facility]  Priscila Sierra she is maintaining her weight at 186 2  She is frustrated since she seems to be "stuck" at 80 donato   She is food logging, and walking regularly   Reviewed her carbs, protein and calories    Encouraged her to continue logging (weighing/measuring) and to increase her walking and add some strength training   Reviewed the results of her test - Reevue indirect calorimter revealed REE is fast (61%)       compared to the predictive normal for someone her same age, height, and gender  Please note she did have a cup of coffee at 5 am, and the test was not until 8:15 so it should not effect her result  (we recommend fasting for 3 - 4 hours before that REE test )  She wanted to see Dr Michael Rodriguez, and is possibly interested in medication if appropriate       Patient seen by Medical Provider in past 6 months:  yes  Requested to schedule appointment with Medical Provider: No        Anthropometric Measurements  Start Weight (#): 188  2 [address] [facility] new start on 7/20  Current Weight (#): 186 2    TBW % Change from start weight: 1%  Ideal Body Weight (#): 130 lbs  Goal Weight (#): 150 - 155 lbs     Weight Loss History  Previous weight loss attempts: Commercial Programs (Weight Watchers, Leatha Sago, etc )  Self Created Diets (Portion Control, Healthy Food Choices, etc )     Food and Nutrition Related History  Wake up: 5:30 - 6:00 am  Bed Time: 10 pm     Food Recall  Breakfast:protein shake (homemade)    (220 - 396)        Snack:  Lunch:  Meal replacement  (200)  Snack:fruit or cheese  Dinner: chicken or fish, vegetables and starch (pasta or rice) or mashed potatoes  Snack: tbsp PB or flat bread or bar        Beverages: water, coffee, sparkling water  Volume of beverage intake: 60oz     Weekends: Same  Cravings: sweets  Trouble area of day: night time     Frequency of Eating out: once per week  Food restrictions:none  Cooking: self   Food Shopping: self     Physical Activity Intake  Activity:walking  Frequency:most days  Physical limitations/barriers to exercise: none     Estimated Needs  Energy     Bear [place]ne Energy Needs: BMR : 5468   8-2# loss weekly sedentary:  784 - 1284           1-2# loss weekly lightly active:9304 - 1544  Protein:71 - 89     (1 2-1 5g/kg IBW)  Fluid:  68     (35mL/kg IBW)     Nutrition Diagnosis  Yes;    Overweight/obesity  related to Excess energy intake as evidenced by  BMI more than normative standard for age and sex (obesity-grade I 30-34  9)     Nutrition Intervention     Nutrition Prescription  Calories:1000 - 1200  Protein:  73 - 80  Fluid: 68        Nutrition Education:    [facility] Manual  Calorie controlled menu  Lean protein food choices  Healthy snack options  Food journaling tips        Nutrition Counseling:  Strategies: meal planning, portion sizes, healthy snack choices, hydration, fiber intake, protein intake, exercise, food journal        Monitoring and Evaluation:  Evaluation criteria:  Energy Intake  Meet protein needs  Maintain adequate hydration  Monitor weekly weight  Meal planning/preparation  Food journal   Decreased portions at mealtimes and snacks  Physical activity      Barriers to learning:none  Readiness to change: Action:  (Changing behavior)  Comprehension: good  Expected Compliance: good

## 2022-11-16 ENCOUNTER — CLINICAL SUPPORT (OUTPATIENT)
Dept: BARIATRICS | Facility: CLINIC | Age: 52
End: 2022-11-16

## 2022-11-16 VITALS — WEIGHT: 177.6 LBS | HEIGHT: 66 IN | BODY MASS INDEX: 28.54 KG/M2

## 2022-11-16 DIAGNOSIS — R63.5 ABNORMAL WEIGHT GAIN: Primary | ICD-10-CM

## 2022-11-23 ENCOUNTER — OFFICE VISIT (OUTPATIENT)
Dept: BARIATRICS | Facility: CLINIC | Age: 52
End: 2022-11-23

## 2022-11-23 VITALS
WEIGHT: 174 LBS | DIASTOLIC BLOOD PRESSURE: 80 MMHG | RESPIRATION RATE: 16 BRPM | TEMPERATURE: 97.7 F | HEIGHT: 66 IN | BODY MASS INDEX: 27.97 KG/M2 | HEART RATE: 76 BPM | SYSTOLIC BLOOD PRESSURE: 124 MMHG

## 2022-11-23 DIAGNOSIS — E66.09 CLASS 1 OBESITY DUE TO EXCESS CALORIES WITH SERIOUS COMORBIDITY AND BODY MASS INDEX (BMI) OF 30.0 TO 30.9 IN ADULT: ICD-10-CM

## 2022-11-23 DIAGNOSIS — E78.00 PURE HYPERCHOLESTEROLEMIA: ICD-10-CM

## 2022-11-23 DIAGNOSIS — E66.3 OVERWEIGHT: Primary | ICD-10-CM

## 2022-11-23 RX ORDER — PHENTERMINE HYDROCHLORIDE 15 MG/1
15 CAPSULE ORAL EVERY MORNING
Qty: 30 CAPSULE | Refills: 1 | Status: SHIPPED | OUTPATIENT
Start: 2022-12-06 | End: 2023-01-05

## 2022-11-23 NOTE — PROGRESS NOTES
Assessment/Plan:  Glendon Ormond was seen today for follow-up  Diagnoses and all orders for this visit:  1  Overweight   refilled Phentermine stay at same dose , she is asking if higher dose will help her loose more weight  Advised she is in good progress and lost a good amount so far   Controlled substance status discussed  PDMP queried, no misuse or abuse noted  Nutrition prescription:  Calorie goal: 1200kcal   Safe to continue Phentermine low dose 15 mg   Encourage mindful eating, portion control, motivational interview performed to help patient reach goals   Denies any hx of glaucoma, seizures, kidney stones, gallstones  Denies Hx of CAD, PAD, palpitations, arrhythmia  Denies uncontrolled anxiety or depression, insomnia or sleep disturbance  Grief reaction after her son passed   Follow up in approximately 2 mo      2  Pure hypercholesterolemia  Very high LDL  Advised statin repeat panel  - Lipid Panel with Direct LDL reflex; Future         Subjective:   Chief Complaint   Patient presents with   • Follow-up     Patient presents for a follow-up  Patient here to discuss weight associated problems and nutrition goals  HPI: Nohemi Augustin  is a 45 y o  female with excess weight/obesity here to pursue weight management  Patient is pursuing Conservative Program    Most recent notes and records were reviewed    Initial weight loss goal of 5-10% weight loss for improved health  Wt Readings from Last 10 Encounters:   11/23/22 78 9 kg (174 lb)   11/16/22 80 6 kg (177 lb 9 6 oz)   11/02/22 80 kg (176 lb 6 4 oz)   10/26/22 80 3 kg (177 lb)   10/12/22 81 5 kg (179 lb 9 6 oz)   09/28/22 82 3 kg (181 lb 6 4 oz)   09/28/22 82 4 kg (181 lb 9 6 oz)   09/21/22 81 6 kg (180 lb)   09/14/22 82 9 kg (182 lb 12 8 oz)   08/31/22 83 8 kg (184 lb 12 8 oz)       Initial weight:188 2 [address] [facility] [address] new start on 7/20  Last OV weight :185lbs 8/25/22  Started Phentermine  No side effects  Last OV weight : 181 lbs lost 4 lbs  Current weight 174lbs  Food logging: yes stays between 1200-1300kcal  Increased appetite/cravings:none  B:protein shake  L:pudding protein   D: chicken and tomatoes pepper onions   Snacks:hummus , apple   Hydration: 55oz-68oz  Alcohol: none  Sleep: ok  Had mild constipation but fixed with Metamucil   Started to use treadmill       The following portions of the patient's history were reviewed and updated as appropriate: allergies, current medications, past family history, past medical history, past social history, past surgical history, and problem list       Review of Systems   Constitutional: Negative for activity change  Fatigue  HENT: Negative for trouble swallowing  Respiratory: Negative for shortness of breath  Cardiovascular: Negative for chest pain, edema  Gastrointestinal: Negative for abdominal pain, nausea and vomiting, acid reflux, constipation/diarrhea  Psychiatric/Behavioral: Negative for behavioral problems , anxiety or depression    Objective:  /80 (BP Location: Left arm, Patient Position: Sitting, Cuff Size: Large)   Pulse 76   Temp 97 7 °F (36 5 °C)   Resp 16   Ht 5' 5 5" (1 664 m)   Wt 78 9 kg (174 lb)   BMI 28 51 kg/m²   Constitutional: Well-developed, well-nourished and Overweight Body mass index is 28 51 kg/m²  Yo Benavidez HEENT: No conjunctival injection  No thyroid masses  Pulmonary: No increased work of breathing or signs of respiratory distress  Clear respiratory sounds  CV: Well-perfused, Regular rate and rhythm, no murmurs, no edema  GI: increased abdominal girth  Non-distended  Not tender   Endo: no ophthalmopathy, no dermopathy, truncal obesity  MSK: no sarcopenia  Neuro: Oriented to person, place and time  Normal Speech  Normal gait  Psych: Normal affect and mood  No delusion or hallucinations, normal thought process    Labs and Imaging  Recent labs and imaging have been personally reviewed    Lab Results   Component Value Date    WBC 5 42 07/30/2021    HGB 14 4 07/30/2021    HCT 42 2 07/30/2021 MCV 93 07/30/2021     07/30/2021     Lab Results   Component Value Date     04/06/2017    SODIUM 140 02/17/2022    K 3 9 02/17/2022     02/17/2022    CO2 31 02/17/2022    ANIONGAP 7 08/13/2015    AGAP 5 02/17/2022    BUN 19 02/17/2022    CREATININE 0 95 02/17/2022    GLUC 99 08/04/2021    GLUF 91 02/17/2022    CALCIUM 9 5 02/17/2022    AST 17 09/18/2020    ALT 30 09/18/2020    ALKPHOS 66 09/18/2020    PROT 6 7 04/06/2017    TP 7 8 09/18/2020    BILITOT 0 6 04/06/2017    TBILI 0 40 09/18/2020    EGFR 68 02/17/2022     Lab Results   Component Value Date    HGBA1C 5 2 08/20/2021     Lab Results   Component Value Date    [id number] 2 390 05/31/2022     Lab Results   Component Value Date    CHOLESTEROL 283 (H) 02/10/2022     Lab Results   Component Value Date    HDL 69 02/10/2022     Lab Results   Component Value Date    TRIG 132 02/10/2022     Lab Results   Component Value Date    LDLCALC 188 (H) 02/10/2022

## 2022-12-08 DIAGNOSIS — K04.7 TOOTH INFECTION: Primary | ICD-10-CM

## 2022-12-08 RX ORDER — METRONIDAZOLE 500 MG/1
500 TABLET ORAL EVERY 8 HOURS SCHEDULED
Qty: 15 TABLET | Refills: 0 | Status: SHIPPED | OUTPATIENT
Start: 2022-12-08 | End: 2022-12-13

## 2022-12-20 ENCOUNTER — OFFICE VISIT (OUTPATIENT)
Dept: FAMILY MEDICINE CLINIC | Facility: CLINIC | Age: 52
End: 2022-12-20

## 2022-12-20 VITALS
DIASTOLIC BLOOD PRESSURE: 94 MMHG | WEIGHT: 174 LBS | HEIGHT: 66 IN | SYSTOLIC BLOOD PRESSURE: 146 MMHG | OXYGEN SATURATION: 97 % | TEMPERATURE: 96.7 F | BODY MASS INDEX: 27.97 KG/M2 | HEART RATE: 77 BPM

## 2022-12-20 DIAGNOSIS — M79.642 BILATERAL HAND PAIN: Primary | ICD-10-CM

## 2022-12-20 DIAGNOSIS — L60.3 BRITTLE NAILS: ICD-10-CM

## 2022-12-20 DIAGNOSIS — M79.641 BILATERAL HAND PAIN: Primary | ICD-10-CM

## 2022-12-20 RX ORDER — IBUPROFEN 800 MG/1
800 TABLET ORAL EVERY 8 HOURS PRN
Qty: 90 TABLET | Refills: 1 | Status: SHIPPED | OUTPATIENT
Start: 2022-12-20 | End: 2023-01-19

## 2022-12-20 RX ORDER — PREDNISONE 20 MG/1
40 TABLET ORAL DAILY
Qty: 10 TABLET | Refills: 0 | Status: SHIPPED | OUTPATIENT
Start: 2022-12-20 | End: 2022-12-25
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
## 2022-12-27 ENCOUNTER — PROCEDURE VISIT (OUTPATIENT)
Dept: NEUROLOGY | Facility: CLINIC | Age: 52
End: 2022-12-27

## 2022-12-27 DIAGNOSIS — M79.641 BILATERAL HAND PAIN: ICD-10-CM

## 2022-12-27 DIAGNOSIS — M79.642 BILATERAL HAND PAIN: ICD-10-CM

## 2022-12-27 NOTE — PROGRESS NOTES
EMG 2 Limb Upper Extremity     Date/Time 12/27/2022 12:00 PM     Performed by  Danny Raygoza MD     Authorized by Shayne Bailey MD                [facility] AT [address]  [phone]    Electromyography & Nerve Conduction Studies Report          Full Name: Gurpreet Hamilton Gender: Female  MRN: [id number] YOB: 1970      Visit Date: 12/27/2022 12:32 PM  Age: 45 Years  Examining Physician: Danny Raygoza MD   Referring Physician: DR Aureliano Butler History: 77-year-old right-handed female presents with complaints of bilateral hand pain associated with numbness and tingling sensation, affecting all fingers  She denies any radicular symptoms  Patient is being evaluated for a focal neuropathy  TEMP 33       Sensory Nerve Conduction Study       Nerve / Sites Rec  Site Onset Lat Peak Lat  Amp Segments Distance Peak Diff Velocity     ms ms µV  cm ms m/s   R Median - Dig II (Antidromic)      Wrist Index 3 1 4 2 34 2 Wrist - Index 13  42      Ref  ?3 5 ? 20 0 Ref  ?50   L Median - Dig II (Antidromic)      Wrist Index 3 5 4 4 23 7 Wrist - Index 13  37      Ref  ?3 5 ? 20 0 Ref  ?50   R Ulnar - Dig V (Antidromic)      Wrist Dig V 2 0 2 8 43 2 Wrist - Dig V 11  54      Ref  ?3 1 ? 17 0 Ref  ?50   L Ulnar - Dig V (Antidromic)      Wrist Dig V 2 0 2 9 36 3 Wrist - Dig V 11  54      Ref  ?3 1 ? 17 0 Ref  ?50   R Median, Ulnar - Palmar      Median Palm Wrist 1 9 2 4 68 4 Median Palm - Wrist 8  42      Ref  ?2 2 ? 50 0 Ref  ?50      Ulnar Palm Wrist 1 1 1 7 31 0 Ulnar Palm - Wrist 8  73      Ref  ?2 2 ? 12 0 Ref  ?50        Median Palm - Ulnar Palm  0 7         Ref  ?0 4    L Median, Ulnar - Palmar      Median Palm Wrist 2 0 2 8 34 8 Median Palm - Wrist 8  39      Ref  ?2 2 ? 50 0 Ref  ?50      Ulnar Palm Wrist 1 0 1 4 41 1 Ulnar Palm - Wrist 8  77      Ref  ?2 2 ? 12 0 Ref     ?50        Median Palm - Ulnar Palm  1 4 Ref   ?0 4    R Radial - Superficial (Antidromic)      Forearm Wrist 1 6 2 5 22 7 Forearm - Wrist 10  62      Ref  ?2 9 ? 15 0 Ref  ?50   L Radial - Superficial (Antidromic)      Forearm Wrist 1 6 2 3 19 4 Forearm - Wrist 10  64      Ref  ?2 9 ? 15 0 Ref  ?50       Motor Nerve Conduction Study       Nerve / Sites Muscle Latency Ref  Amplitude Ref  Segments Distance Lat Diff Velocity Ref  ms ms mV mV  cm ms m/s m/s   R Median - APB      Wrist APB 3 9 ?4 4 13 1 ?4 0 Wrist - APB 7         Elbow APB 7 7  12 6  Elbow - Wrist 21 3 81 55 ?49   L Median - APB      Wrist APB 3 8 ?4 4 7 7 ?4 0 Wrist - APB 7         Elbow APB 7 8  7 6  Elbow - Wrist 21 3 92 54 ?49   R Ulnar - ADM      Wrist ADM 2 3 ?3 3 10 5 ? 6 0 Wrist - ADM 7         B  Elbow ADM 6 0  10 5  B  Elbow - Wrist 21 3 73 56 ?49      A  Elbow ADM 7 5  9 6  A  Elbow - B  Elbow 10 1 54 65 ?49   L Ulnar - ADM      Wrist ADM 2 3 ?3 3 8 8 ?6 0 Wrist - ADM 7         B  Elbow ADM 5 5  8 6  B  Elbow - Wrist 21 3 25 65 ?49      A  Elbow ADM 7 0  8 0  A  Elbow - B  Elbow 10 1 52 66 ?49       F Waves       Nerve F Latency Ref  ms ms   R Median - APB  24 7 ? 31 0   R Ulnar - ADM  23 9 ?32 0   L Median - APB  24 5 ?31 0   L Ulnar - ADM  23 8 ? 32 0       EMG Summary Table     Spontaneous MUAP Recruitment   Muscle Nerve Roots IA Fib PSW Fasc H F  Dur  Amp PPP Config Pattern   L  First dorsal interosseous Ulnar C8-T1 NL None None None None NL NL None NL NL   L  Deltoid Axillary C5-C6 NL None None None None NL NL None NL NL   R  Deltoid Axillary C5-C6 NL None None None None NL NL None NL NL   R  First dorsal interosseous Ulnar C8-T1 NL None None None None NL NL None NL NL   L  Biceps brachii Musculocut  C5-C6 NL None None None None NL NL None NL NL   R  Biceps brachii Musculocut  C5-C6 NL None None None None NL NL None NL NL   L  Triceps brachii Radial C6-C8 NL None None None None NL NL None NL NL   R   Triceps brachii Radial C6-C8 NL None None None None NL NL None NL NL   L  Pronator teres Median C6-C7 NL None None None None NL NL None NL NL   R  Pronator teres Median C6-C7 NL None None None None NL NL None NL NL   L  Abductor pollicis brevis Median P9-N3 NL None None None None NL NL None NL NL   R  Abductor pollicis brevis Median N6-S2 NL None None None None NL NL None NL NL   L  Cervical paraspinals (low)  - NL None None None None NL NL None NL NL   R  Cervical paraspinals (low)  - NL None None None None NL NL None NL NL                                       Summary      The left and right median and ulnar motor conduction velocities and compound muscle action potentials were normal with normal distal latencies across the wrists  The left and right median sensory peak latency was prolonged with a normal sensory action potential amplitude and a slowed conduction velocity across the wrist   The bilateral superficial radial and ulnar sensory conduction velocity and sensory action potentials were also normal with normal distal latencies across the wrists  The right median palmar evoked response was prolonged by 0 7 ms as compared to the right ulnar palmar evoked response of the same distance  The left median palmar evoked response was prolonged by 1 4 ms as compared to the left ulnar palmar evoked response of the same distance  The left and right median and ulnar F wave latencies were within normal limits  Concentric needle EMG of the upper extremities bilaterally and cervical paraspinal muscles revealed no spontaneous activities  Early recruited motor units appear normal  Recruitment patterns were full or full for effort         The risks and benefits of this procedure have been explained to the patient  Impression:      Abnormal study  There is electrophysiologic evidence of a:    1  Bilateral mild median nerve compression neuropathy at the wrist with demyelinative changes, consistent with the diagnosis of carpal tunnel syndrome      2  There is no evidence of a cervical radiculopathy or ulnar neuropathy bilaterally

## 2022-12-29 DIAGNOSIS — G56.03 CARPAL TUNNEL SYNDROME, BILATERAL: Primary | ICD-10-CM

## 2022-12-29 RX ORDER — PHENYLEPHRINE HYDROCHLORIDE 10 MG/1
TABLET, COATED ORAL
Qty: 1 EACH | Refills: 1 | Status: SHIPPED | OUTPATIENT
Start: 2022-12-29

## 2023-01-01 DIAGNOSIS — G56.03 BILATERAL CARPAL TUNNEL SYNDROME: Primary | ICD-10-CM

## 2023-01-03 ENCOUNTER — TELEPHONE (OUTPATIENT)
Dept: OBGYN CLINIC | Facility: HOSPITAL | Age: 53
End: 2023-01-03

## 2023-01-03 NOTE — TELEPHONE ENCOUNTER
Patient is being referred to a orthopedics  Please schedule accordingly      [address]   [phone]

## 2023-01-10 ENCOUNTER — TELEPHONE (OUTPATIENT)
Dept: OBGYN CLINIC | Facility: HOSPITAL | Age: 53
End: 2023-01-10

## 2023-01-10 NOTE — TELEPHONE ENCOUNTER
Caller: Patient    Doctor: Chris Hyde    Reason for call: Patient called to state her hand pain is excruciating. Does Dr Chris Hyde have any cancellations?     Call back#: [phone]

## 2023-01-12 ENCOUNTER — TELEPHONE (OUTPATIENT)
Dept: BARIATRICS | Facility: CLINIC | Age: 53
End: 2023-01-12

## 2023-01-19 ENCOUNTER — OFFICE VISIT (OUTPATIENT)
Dept: OBGYN CLINIC | Facility: CLINIC | Age: 53
End: 2023-01-19

## 2023-01-19 VITALS
HEART RATE: 85 BPM | DIASTOLIC BLOOD PRESSURE: 85 MMHG | HEIGHT: 66 IN | WEIGHT: 174 LBS | BODY MASS INDEX: 27.97 KG/M2 | SYSTOLIC BLOOD PRESSURE: 129 MMHG

## 2023-01-19 DIAGNOSIS — G56.20 ULNAR NEURITIS, UNSPECIFIED LATERALITY: ICD-10-CM

## 2023-01-19 DIAGNOSIS — G56.03 BILATERAL CARPAL TUNNEL SYNDROME: Primary | ICD-10-CM

## 2023-01-19 RX ORDER — LIDOCAINE HYDROCHLORIDE 10 MG/ML
1 INJECTION, SOLUTION INFILTRATION; PERINEURAL
Status: COMPLETED | OUTPATIENT
Start: 2023-01-19 | End: 2023-01-19

## 2023-01-19 RX ORDER — DEXAMETHASONE SODIUM PHOSPHATE 10 MG/ML
40 INJECTION, SOLUTION INTRAMUSCULAR; INTRAVENOUS
Status: COMPLETED | OUTPATIENT
Start: 2023-01-19 | End: 2023-01-19

## 2023-01-19 RX ADMIN — DEXAMETHASONE SODIUM PHOSPHATE 40 MG: 10 INJECTION, SOLUTION INTRAMUSCULAR; INTRAVENOUS at 11:11

## 2023-01-19 RX ADMIN — LIDOCAINE HYDROCHLORIDE 1 ML: 10 INJECTION, SOLUTION INFILTRATION; PERINEURAL at 11:11
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
## 2023-02-01 ENCOUNTER — OFFICE VISIT (OUTPATIENT)
Dept: BARIATRICS | Facility: CLINIC | Age: 53
End: 2023-02-01

## 2023-02-01 ENCOUNTER — TELEPHONE (OUTPATIENT)
Dept: BARIATRICS | Facility: CLINIC | Age: 53
End: 2023-02-01

## 2023-02-01 VITALS
HEART RATE: 85 BPM | RESPIRATION RATE: 16 BRPM | SYSTOLIC BLOOD PRESSURE: 126 MMHG | DIASTOLIC BLOOD PRESSURE: 82 MMHG | HEIGHT: 66 IN | BODY MASS INDEX: 28.03 KG/M2 | WEIGHT: 174.4 LBS

## 2023-02-01 DIAGNOSIS — E66.09 CLASS 1 OBESITY DUE TO EXCESS CALORIES WITH SERIOUS COMORBIDITY AND BODY MASS INDEX (BMI) OF 30.0 TO 30.9 IN ADULT: ICD-10-CM

## 2023-02-01 RX ORDER — PHENTERMINE HYDROCHLORIDE 15 MG/1
15 CAPSULE ORAL EVERY MORNING
Qty: 30 CAPSULE | Refills: 1 | Status: SHIPPED | OUTPATIENT
Start: 2023-03-06 | End: 2023-04-05

## 2023-02-01 NOTE — PROGRESS NOTES
Assessment/Plan:  Edilberto Restrepo was seen today for follow-up  Diagnoses and all orders for this visit:  1  Overweight   refilled Phentermine stay at same dose starting March , she has a month refill because she was sick and traveled all month   Restart same dose has no side effects so far  Jaelyn Mclaughlin plan as discussed before  Metabolic testing to be done with dietician  Return in 2 mo  Advised she is in good progress and lost a good amount so far   Controlled substance status discussed  PDMP queried, no misuse or abuse noted  Nutrition prescription:  Calorie goal: 1200kcal   Safe to continue Phentermine low dose 15 mg   Encourage mindful eating, portion control, motivational interview performed to help patient reach goals   Denies any hx of glaucoma, seizures, kidney stones, gallstones  Denies Hx of CAD, PAD, palpitations, arrhythmia  Denies uncontrolled anxiety or depression, insomnia or sleep disturbance  Grief reaction after her son passed   Follow up in approximately 2 mo      2  Pure hypercholesterolemia  Very high LDL  Advised statin repeat panel  - Lipid Panel with Direct LDL          Subjective:   Chief Complaint   Patient presents with   • Follow-up     Patient here to discuss weight associated problems and nutrition goals  HPI: Lesly Sol  is a 45 y o  female with excess weight/obesity here to pursue weight management  Patient is pursuing Conservative Program    Most recent notes and records were reviewed    Initial weight loss goal of 5-10% weight loss for improved health  Wt Readings from Last 10 Encounters:   02/01/23 79 1 kg (174 lb 6 4 oz)   01/19/23 78 9 kg (174 lb)   12/20/22 78 9 kg (174 lb)   11/23/22 78 9 kg (174 lb)   11/16/22 80 6 kg (177 lb 9 6 oz)   11/02/22 80 kg (176 lb 6 4 oz)   10/26/22 80 3 kg (177 lb)   10/12/22 81 5 kg (179 lb 9 6 oz)   09/28/22 82 3 kg (181 lb 6 4 oz)   09/28/22 82 4 kg (181 lb 9 6 oz)       Initial weight:188 2 6200 [facility] new start on 7/20  Last OV weight :185lbs 8/25/22  Off Phentermine for 1 mo due to vacation and   Last OV weight : 174lbs  Current weight 174lbs same as last Daljit Lees logging: no but will restart  Increased appetite/cravings:none  B:protein shake  L:pudding protein   D: chicken and tomatoes pepper onions   Snacks:hummus , apple   Hydration: 55oz-68oz  Alcohol: none  Sleep: ok  Had mild constipation but fixed with Metamucil   Started to use treadmill       The following portions of the patient's history were reviewed and updated as appropriate: allergies, current medications, past family history, past medical history, past social history, past surgical history, and problem list       Review of Systems   Constitutional: Negative for activity change  Fatigue  HENT: Negative for trouble swallowing  Respiratory: Negative for shortness of breath  Cardiovascular: Negative for chest pain, edema  Gastrointestinal: Negative for abdominal pain, nausea and vomiting, acid reflux, constipation/diarrhea  Psychiatric/Behavioral: Negative for behavioral problems , anxiety or depression    Objective:  /82 (BP Location: Left arm, Patient Position: Sitting, Cuff Size: Large)   Pulse 85   Resp 16   Ht 5' 5 5" (1 664 m)   Wt 79 1 kg (174 lb 6 4 oz)   BMI 28 58 kg/m²   Constitutional: Well-developed, well-nourished and Overweight Body mass index is 28 58 kg/m²  Mary Pratt HEENT: No conjunctival injection  No thyroid masses  Pulmonary: No increased work of breathing or signs of respiratory distress  Clear respiratory sounds  CV: Well-perfused, Regular rate and rhythm, no murmurs, no edema  GI: increased abdominal girth  Non-distended  Not tender   Endo: no ophthalmopathy, no dermopathy, truncal obesity  MSK: no sarcopenia  Neuro: Oriented to person, place and time  Normal Speech  Normal gait  Psych: Normal affect and mood  No delusion or hallucinations, normal thought process    Labs and Imaging  Recent labs and imaging have been personally reviewed    Lab Results Component Value Date    WBC 5 42 07/30/2021    HGB 14 4 07/30/2021    HCT 42 2 07/30/2021    MCV 93 07/30/2021     07/30/2021     Lab Results   Component Value Date     04/06/2017    SODIUM 140 02/17/2022    K 3 9 02/17/2022     02/17/2022    CO2 31 02/17/2022    ANIONGAP 7 08/13/2015    AGAP 5 02/17/2022    BUN 19 02/17/2022    CREATININE 0 95 02/17/2022    GLUC 99 08/04/2021    GLUF 91 02/17/2022    CALCIUM 9 5 02/17/2022    AST 17 09/18/2020    ALT 30 09/18/2020    ALKPHOS 66 09/18/2020    PROT 6 7 04/06/2017    TP 7 8 09/18/2020    BILITOT 0 6 04/06/2017    TBILI 0 40 09/18/2020    EGFR 68 02/17/2022     Lab Results   Component Value Date    HGBA1C 5 2 08/20/2021     Lab Results   Component Value Date    MVT1IEHMMGCH 2 390 05/31/2022     Lab Results   Component Value Date    CHOLESTEROL 283 (H) 02/10/2022     Lab Results   Component Value Date    HDL 69 02/10/2022     Lab Results   Component Value Date    TRIG 132 02/10/2022     Lab Results   Component Value Date    LDLCALC 188 (H) 02/10/2022

## 2023-02-01 NOTE — TELEPHONE ENCOUNTER
Patient scheduled for REE visit, tomorrow, 2/2/23 in error  Office will receive machine on Friday-patient can be rescheduled as soon as next week

## 2023-02-09 ENCOUNTER — TELEPHONE (OUTPATIENT)
Dept: BARIATRICS | Facility: CLINIC | Age: 53
End: 2023-02-09

## 2023-02-09 NOTE — TELEPHONE ENCOUNTER
Called patient to cancel her appointment today as RD not in  I told her Reception will be in contact with her later today to reschedule appt 
Pt is r/s 
steady

## 2023-02-13 ENCOUNTER — OFFICE VISIT (OUTPATIENT)
Dept: BARIATRICS | Facility: CLINIC | Age: 53
End: 2023-02-13

## 2023-02-13 VITALS — BODY MASS INDEX: 27.58 KG/M2 | WEIGHT: 171.6 LBS | HEIGHT: 66 IN

## 2023-02-13 DIAGNOSIS — M79.642 BILATERAL HAND PAIN: ICD-10-CM

## 2023-02-13 DIAGNOSIS — M79.641 BILATERAL HAND PAIN: ICD-10-CM

## 2023-02-13 DIAGNOSIS — R63.5 ABNORMAL WEIGHT GAIN: Primary | ICD-10-CM

## 2023-02-13 NOTE — PROGRESS NOTES
Reevue indirect calorimter revealed REE is normal (+9%)  compared to the predictive normal for someone her same age, height, and gender       Reevue Indirect Calorimeter REE:  1584          Weight loss without exercise: 1268 - 1584          Weight loss with exercise:                      Maintenance:      1584 -2058

## 2023-02-14 RX ORDER — IBUPROFEN 800 MG/1
TABLET ORAL
Qty: 90 TABLET | Refills: 1 | Status: SHIPPED | OUTPATIENT
Start: 2023-02-14

## 2023-02-22 ENCOUNTER — CLINICAL SUPPORT (OUTPATIENT)
Dept: BARIATRICS | Facility: CLINIC | Age: 53
End: 2023-02-22

## 2023-02-22 VITALS — HEIGHT: 66 IN | BODY MASS INDEX: 27.93 KG/M2 | WEIGHT: 173.8 LBS

## 2023-02-22 DIAGNOSIS — R63.5 ABNORMAL WEIGHT GAIN: Primary | ICD-10-CM

## 2023-02-24 ENCOUNTER — HOSPITAL ENCOUNTER (OUTPATIENT)
Dept: RADIOLOGY | Facility: HOSPITAL | Age: 53
Discharge: HOME | End: 2023-02-24
Attending: SURGERY

## 2023-02-24 ENCOUNTER — TELEPHONE (OUTPATIENT)
Dept: OBGYN CLINIC | Facility: HOSPITAL | Age: 53
End: 2023-02-24

## 2023-02-24 DIAGNOSIS — G56.20 ULNAR NEURITIS, UNSPECIFIED LATERALITY: ICD-10-CM

## 2023-02-24 NOTE — TELEPHONE ENCOUNTER
Caller: Patient    Doctor: Denisse William    Reason for call: Patient asking if she needs to see you or can she just schedule sx after US  US performed 2/24/23      Call back#: [phone]

## 2023-03-08 ENCOUNTER — OFFICE VISIT (OUTPATIENT)
Dept: OBGYN CLINIC | Facility: CLINIC | Age: 53
End: 2023-03-08

## 2023-03-08 ENCOUNTER — CLINICAL SUPPORT (OUTPATIENT)
Dept: BARIATRICS | Facility: CLINIC | Age: 53
End: 2023-03-08

## 2023-03-08 VITALS — WEIGHT: 174.6 LBS | BODY MASS INDEX: 28.06 KG/M2 | HEIGHT: 66 IN

## 2023-03-08 VITALS
HEIGHT: 66 IN | BODY MASS INDEX: 27.32 KG/M2 | SYSTOLIC BLOOD PRESSURE: 121 MMHG | DIASTOLIC BLOOD PRESSURE: 81 MMHG | WEIGHT: 170 LBS | HEART RATE: 89 BPM

## 2023-03-08 DIAGNOSIS — G56.02 CARPAL TUNNEL SYNDROME ON LEFT: Primary | ICD-10-CM

## 2023-03-08 DIAGNOSIS — G56.21 CUBITAL TUNNEL SYNDROME ON RIGHT: ICD-10-CM

## 2023-03-08 DIAGNOSIS — G56.01 RIGHT CARPAL TUNNEL SYNDROME: ICD-10-CM

## 2023-03-08 DIAGNOSIS — R63.5 ABNORMAL WEIGHT GAIN: Primary | ICD-10-CM

## 2023-03-08 DIAGNOSIS — G56.22 CUBITAL TUNNEL SYNDROME ON LEFT: ICD-10-CM
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
## 2023-03-14 DIAGNOSIS — R13.10 DYSPHAGIA, UNSPECIFIED TYPE: ICD-10-CM

## 2023-03-14 DIAGNOSIS — K21.9 GASTROESOPHAGEAL REFLUX DISEASE WITHOUT ESOPHAGITIS: ICD-10-CM

## 2023-03-14 RX ORDER — PANTOPRAZOLE SODIUM 40 MG/1
TABLET, DELAYED RELEASE ORAL
Qty: 60 TABLET | Refills: 11 | Status: SHIPPED | OUTPATIENT
Start: 2023-03-14

## 2023-03-15 RX ORDER — CHLORHEXIDINE GLUCONATE 0.12 MG/ML
15 RINSE ORAL ONCE
OUTPATIENT
Start: 2023-03-15 | End: 2023-03-15

## 2023-03-22 ENCOUNTER — CLINICAL SUPPORT (OUTPATIENT)
Dept: BARIATRICS | Facility: CLINIC | Age: 53
End: 2023-03-22

## 2023-03-22 VITALS — HEIGHT: 66 IN | WEIGHT: 173.2 LBS | BODY MASS INDEX: 27.83 KG/M2

## 2023-03-22 DIAGNOSIS — R63.5 ABNORMAL WEIGHT GAIN: Primary | ICD-10-CM

## 2023-03-29 ENCOUNTER — CLINICAL SUPPORT (OUTPATIENT)
Dept: BARIATRICS | Facility: CLINIC | Age: 53
End: 2023-03-29

## 2023-03-29 VITALS — BODY MASS INDEX: 27.8 KG/M2 | HEIGHT: 66 IN | WEIGHT: 173 LBS

## 2023-03-29 DIAGNOSIS — R63.5 ABNORMAL WEIGHT GAIN: Primary | ICD-10-CM

## 2023-04-26 ENCOUNTER — CLINICAL SUPPORT (OUTPATIENT)
Dept: BARIATRICS | Facility: CLINIC | Age: 53
End: 2023-04-26

## 2023-04-26 ENCOUNTER — OFFICE VISIT (OUTPATIENT)
Dept: BARIATRICS | Facility: CLINIC | Age: 53
End: 2023-04-26

## 2023-04-26 VITALS — HEIGHT: 66 IN | WEIGHT: 173 LBS | BODY MASS INDEX: 27.8 KG/M2

## 2023-04-26 VITALS — WEIGHT: 173 LBS | BODY MASS INDEX: 24.77 KG/M2 | HEIGHT: 70 IN

## 2023-04-26 DIAGNOSIS — R63.5 ABNORMAL WEIGHT GAIN: Primary | ICD-10-CM

## 2023-04-26 NOTE — PROGRESS NOTES
Weight Management Medical Nutrition Assessment  Yolanda  was here today for a follow-up in the [facility] Program   Today she is maintaining her weight at 173 0 lbs    She reports that she is food logging, and walking regularly   Reviewed her carbs, protein and calories    Encouraged her to continue logging (weighing/measuring) and to increase her walking and add some strength training       Patient seen by Medical Provider in past 6 months:  yes  Requested to schedule appointment with Medical Provider: No        Anthropometric Measurements  Start Weight (#): 188  2 6[address] [facility] new start on 7/20  Current Weight (#): 173 0   TBW % Change from start weight: 8%  Ideal Body Weight (#): 130 lbs  Goal Weight (#): 150 - 155 lbs     Weight Loss History  Previous weight loss attempts: Commercial Programs (Weight Watchers, [employer], etc )  Self Created Diets (Portion Control, Healthy Food Choices, etc )     Food and Nutrition Related History  Wake up: 5:30 - 6:00 am  Bed Time: 10 pm     Food Recall  Breakfast:protein shake (homemade)    (220 - 396)        Snack:  Lunch:  Meal replacement  (200)  Snack:fruit or cheese  Dinner: chicken or fish, vegetables and starch (pasta or rice) or mashed potatoes  Snack: tbsp PB or flat bread or bar        Beverages: water, coffee, sparkling water  Volume of beverage intake: 60oz     Weekends: Same  Cravings: sweets  Trouble area of day: night time     Frequency of Eating out: once per week  Food restrictions:none  Cooking: self   Food Shopping: self     Physical Activity Intake  Activity:walking  Frequency:most days  Physical limitations/barriers to exercise: none     Estimated Needs  Energy     [employer] Energy Needs:  BMR : 6281   3-6# loss weekly sedentary:  784 - 1660           7-6# loss weekly lightly active:1044 - 1544  Protein:71 - 89     (1 2-1 5g/kg IBW)  Fluid:  68     (35mL/kg IBW)     Nutrition Diagnosis  Yes;    Overweight/obesity  related to Excess energy intake as evidenced by [facility] more than normative standard for age and sex (obesity-grade I 26-30  9)     Nutrition Intervention     Nutrition Prescription  Calories:1000 - 1200  Protein:  97 - 80  Fluid: 68        Nutrition Education:    Healthy Core Manual  Calorie controlled menu  Lean protein food choices  Healthy snack options  Food journaling tips        Nutrition Counseling:  Strategies: meal planning, portion sizes, healthy snack choices, hydration, fiber intake, protein intake, exercise, food journal        Monitoring and Evaluation:  Evaluation criteria:  Energy Intake  Meet protein needs  Maintain adequate hydration  Monitor weekly weight  Meal planning/preparation  Food journal   Decreased portions at mealtimes and snacks  Physical activity      Barriers to learning:none  Readiness to change: Action:  (Changing behavior)  Comprehension: good  Expected Compliance: good

## 2023-05-08 ENCOUNTER — APPOINTMENT (OUTPATIENT)
Dept: LAB | Facility: HOSPITAL | Age: 53
End: 2023-05-08

## 2023-05-08 ENCOUNTER — OFFICE VISIT (OUTPATIENT)
Dept: FAMILY MEDICINE CLINIC | Facility: CLINIC | Age: 53
End: 2023-05-08

## 2023-05-08 ENCOUNTER — HOSPITAL ENCOUNTER (OUTPATIENT)
Dept: CT IMAGING | Facility: HOSPITAL | Age: 53
Discharge: HOME | End: 2023-05-08

## 2023-05-08 VITALS
SYSTOLIC BLOOD PRESSURE: 130 MMHG | HEART RATE: 77 BPM | HEIGHT: 66 IN | BODY MASS INDEX: 27.64 KG/M2 | OXYGEN SATURATION: 97 % | WEIGHT: 172 LBS | DIASTOLIC BLOOD PRESSURE: 84 MMHG | TEMPERATURE: 99.2 F

## 2023-05-08 DIAGNOSIS — R10.30 LOWER ABDOMINAL PAIN: Primary | ICD-10-CM

## 2023-05-08 DIAGNOSIS — R10.30 LOWER ABDOMINAL PAIN: ICD-10-CM

## 2023-05-08 DIAGNOSIS — K57.92 DIVERTICULITIS: Primary | ICD-10-CM

## 2023-05-08 LAB
ALBUMIN SERPL BCP-MCNC: 4.4 G/DL (ref 3.5–5)
ALP SERPL-CCNC: 83 U/L (ref 34–104)
ALT SERPL W P-5'-P-CCNC: 21 U/L (ref 7–52)
ANION GAP SERPL CALCULATED.3IONS-SCNC: 5 MMOL/L (ref 4–13)
AST SERPL W P-5'-P-CCNC: 20 U/L (ref 13–39)
BASOPHILS # BLD AUTO: 0.06 THOUSANDS/ÂΜL (ref 0–0.1)
BASOPHILS NFR BLD AUTO: 1 % (ref 0–1)
BILIRUB SERPL-MCNC: 0.31 MG/DL (ref 0.2–1)
BUN SERPL-MCNC: 16 MG/DL (ref 5–25)
CALCIUM SERPL-MCNC: 10 MG/DL (ref 8.4–10.2)
CHLORIDE SERPL-SCNC: 104 MMOL/L (ref 96–108)
CO2 SERPL-SCNC: 30 MMOL/L (ref 21–32)
CREAT SERPL-MCNC: 0.83 MG/DL (ref 0.6–1.3)
EOSINOPHIL # BLD AUTO: 0.49 THOUSAND/ÂΜL (ref 0–0.61)
EOSINOPHIL NFR BLD AUTO: 8 % (ref 0–6)
ERYTHROCYTE [DISTWIDTH] IN BLOOD BY AUTOMATED COUNT: 11.8 % (ref 11.6–15.1)
GFR SERPL CREATININE-BSD FRML MDRD: 80 ML/MIN/1.73SQ M
GLUCOSE SERPL-MCNC: 94 MG/DL (ref 65–140)
HCT VFR BLD AUTO: 41.5 % (ref 34.8–46.1)
HGB BLD-MCNC: 14.7 G/DL (ref 11.5–15.4)
IMM GRANULOCYTES # BLD AUTO: 0.02 THOUSAND/UL (ref 0–0.2)
IMM GRANULOCYTES NFR BLD AUTO: 0 % (ref 0–2)
LIPASE SERPL-CCNC: 46 U/L (ref 11–82)
LYMPHOCYTES # BLD AUTO: 2.32 THOUSANDS/ÂΜL (ref 0.6–4.47)
LYMPHOCYTES NFR BLD AUTO: 38 % (ref 14–44)
MCH RBC QN AUTO: 32 PG (ref 26.8–34.3)
MCHC RBC AUTO-ENTMCNC: 35.4 G/DL (ref 31.4–37.4)
MCV RBC AUTO: 90 FL (ref 82–98)
MONOCYTES # BLD AUTO: 0.33 THOUSAND/ÂΜL (ref 0.17–1.22)
MONOCYTES NFR BLD AUTO: 5 % (ref 4–12)
NEUTROPHILS # BLD AUTO: 2.92 THOUSANDS/ÂΜL (ref 1.85–7.62)
NEUTS SEG NFR BLD AUTO: 48 % (ref 43–75)
NRBC BLD AUTO-RTO: 0 /100 WBCS
PLATELET # BLD AUTO: 277 THOUSANDS/UL (ref 149–390)
PMV BLD AUTO: 8.8 FL (ref 8.9–12.7)
POTASSIUM SERPL-SCNC: 4.7 MMOL/L (ref 3.5–5.3)
PROT SERPL-MCNC: 7.3 G/DL (ref 6.4–8.4)
RBC # BLD AUTO: 4.59 MILLION/UL (ref 3.81–5.12)
SL AMB  POCT GLUCOSE, UA: NEGATIVE
SL AMB LEUKOCYTE ESTERASE,UA: ABNORMAL
SL AMB POCT BILIRUBIN,UA: ABNORMAL
SL AMB POCT BLOOD,UA: NEGATIVE
SL AMB POCT KETONES,UA: NEGATIVE
SL AMB POCT NITRITE,UA: NEGATIVE
SL AMB POCT PH,UA: 6
SL AMB POCT SPECIFIC GRAVITY,UA: 1.02
SL AMB POCT URINE PROTEIN: NEGATIVE
SL AMB POCT UROBILINOGEN: 0.2
SODIUM SERPL-SCNC: 139 MMOL/L (ref 135–147)
WBC # BLD AUTO: 6.14 THOUSAND/UL (ref 4.31–10.16)

## 2023-05-08 RX ORDER — METRONIDAZOLE 500 MG/1
500 TABLET ORAL EVERY 8 HOURS SCHEDULED
Qty: 21 TABLET | Refills: 0 | Status: SHIPPED | OUTPATIENT
Start: 2023-05-08 | End: 2023-05-15

## 2023-05-08 RX ORDER — LEVOFLOXACIN 750 MG/1
750 TABLET ORAL EVERY 24 HOURS
Qty: 7 TABLET | Refills: 0 | Status: SHIPPED | OUTPATIENT
Start: 2023-05-08 | End: 2023-05-15

## 2023-05-08 RX ADMIN — IOHEXOL 100 ML: 350 INJECTION, SOLUTION INTRAVENOUS at 17:32
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
## 2023-05-09 ENCOUNTER — TELEPHONE (OUTPATIENT)
Dept: FAMILY MEDICINE CLINIC | Facility: CLINIC | Age: 53
End: 2023-05-09

## 2023-05-09 DIAGNOSIS — K57.92 DIVERTICULITIS: Primary | ICD-10-CM

## 2023-05-09 RX ORDER — SULFAMETHOXAZOLE AND TRIMETHOPRIM 800; 160 MG/1; MG/1
1 TABLET ORAL EVERY 12 HOURS SCHEDULED
Qty: 14 TABLET | Refills: 0 | Status: SHIPPED | OUTPATIENT
Start: 2023-05-09 | End: 2023-05-16
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
## 2023-05-10 ENCOUNTER — CLINICAL SUPPORT (OUTPATIENT)
Dept: BARIATRICS | Facility: CLINIC | Age: 53
End: 2023-05-10

## 2023-05-10 VITALS — HEIGHT: 66 IN | WEIGHT: 169.6 LBS | BODY MASS INDEX: 27.26 KG/M2

## 2023-05-10 DIAGNOSIS — R63.5 ABNORMAL WEIGHT GAIN: Primary | ICD-10-CM

## 2023-05-10 LAB — BACTERIA UR CULT: NORMAL

## 2023-05-17 ENCOUNTER — OFFICE VISIT (OUTPATIENT)
Dept: BARIATRICS | Facility: CLINIC | Age: 53
End: 2023-05-17

## 2023-05-17 ENCOUNTER — CLINICAL SUPPORT (OUTPATIENT)
Dept: BARIATRICS | Facility: CLINIC | Age: 53
End: 2023-05-17

## 2023-05-17 VITALS
OXYGEN SATURATION: 98 % | RESPIRATION RATE: 18 BRPM | HEART RATE: 83 BPM | BODY MASS INDEX: 27.68 KG/M2 | WEIGHT: 172.2 LBS | SYSTOLIC BLOOD PRESSURE: 132 MMHG | DIASTOLIC BLOOD PRESSURE: 66 MMHG | HEIGHT: 66 IN

## 2023-05-17 VITALS — WEIGHT: 172.2 LBS | HEIGHT: 66 IN | BODY MASS INDEX: 27.68 KG/M2

## 2023-05-17 DIAGNOSIS — R63.5 ABNORMAL WEIGHT GAIN: Primary | ICD-10-CM

## 2023-05-17 DIAGNOSIS — K57.92 DIVERTICULITIS: ICD-10-CM

## 2023-05-17 DIAGNOSIS — E66.3 OVERWEIGHT: Primary | ICD-10-CM

## 2023-05-17 NOTE — PROGRESS NOTES
Assessment/Plan:  Michelle Goode was seen today for follow-up  Diagnoses and all orders for this visit:  1  Overweight  Used Phentermine on and off due to several bumps in the road   had no side effects so far    Metabolic testing to be done with dietician results reviewed  Advised she is in good progress and lost a good amount so far   Pause Phentermine until the episode of diverticulitis is subsiding and she reintroduces the foods back   She will call for a weight check when ready and will refill Phentermine then low dose works well for her  Nutrition prescription:  Calorie goal: 1200kcal   Encourage mindful eating, portion control, motivational interview performed to help patient reach goals   Grief reaction after her son passed      2  Ac diverticulitis  Finished 10 days of antibiotics  Still has abd pain  Will discuss with GI for now advised low fiber diet , no raw fruits and veggies  Pause Phentermine      Subjective:   Chief Complaint   Patient presents with   • Follow-up     2 month follow up     Patient here to discuss weight associated problems and nutrition goals  HPI: Deniz Sinha  is a 45 y o  female with excess weight/obesity here to pursue weight management  Patient is pursuing [facility] Program    Most recent notes and records were reviewed    Initial weight loss goal of 5-10% weight loss for improved health  Wt Readings from Last 10 Encounters:   05/17/23 78 1 kg (172 lb 3 2 oz)   05/17/23 78 1 kg (172 lb 3 2 oz)   05/10/23 76 9 kg (169 lb 9 6 oz)   05/08/23 78 kg (172 lb)   04/26/23 78 5 kg (173 lb)   04/26/23 78 5 kg (173 lb)   04/19/23 78 7 kg (173 lb 6 4 oz)   04/12/23 77 7 kg (171 lb 6 4 oz)   03/29/23 78 5 kg (173 lb)   03/22/23 78 6 kg (173 lb 3 2 oz)       Initial weight:188 2 [address] new start on 7/20  Last OV weight :185lbs 8/25/22  Off Phentermine for 1 mo due to vacation  Last OV weight : 174lbs restated [employer] [facility] program prescribed Phentermine    Reevue indirect calorimter revealed REE "is normal (+9%)  compared to the predictive normal for someone her same age, height, and gender  Reevue Indirect Calorimeter REE:  1584          Weight loss without exercise: 1268 - 1584          Weight loss with exercise:                      Maintenance:      1584 -2058  Current weight 172lbs   Food logging: no   Increased appetite/cravings:none  Hydration: 55oz-68oz  Alcohol: none  Sleep: okl   Started to use treadmill       The following portions of the patient's history were reviewed and updated as appropriate: allergies, current medications, past family history, past medical history, past social history, past surgical history, and problem list       Review of Systems   Constitutional: Negative for activity change  Fatigue  HENT: Negative for trouble swallowing  Respiratory: Negative for shortness of breath  Cardiovascular: Negative for chest pain, edema  Gastrointestinal: + for abdominal pain,- nausea and vomiting, -acid reflux  Psychiatric/Behavioral: Negative for behavioral problems , anxiety or depression    Objective:  /66 (BP Location: Left arm, Patient Position: Sitting, Cuff Size: Adult)   Pulse 83   Resp 18   Ht 5' 5 5\" (1 664 m)   Wt 78 1 kg (172 lb 3 2 oz)   SpO2 98%   BMI 28 22 kg/m²   Constitutional: Well-developed, well-nourished and Overweight Body mass index is 28 22 kg/m²  Amena Castaneda HEENT: No conjunctival injection  No thyroid masses  Pulmonary: No increased work of breathing or signs of respiratory distress  CV: Well-perfused, Regular rate and rhythm, no murmurs  GI: increased abdominal girth  Non-distended  Endo: no ophthalmopathy, no dermopathy, truncal obesity  MSK: no sarcopenia  Neuro: Oriented to person, place and time  Normal Speech  Normal gait  Psych: Normal affect and mood  No delusion or hallucinations, normal thought process    Labs and Imaging  Recent labs and imaging have been personally reviewed    Lab Results   Component Value Date    WBC 6 14 05/08/2023    HGB " 14 7 05/08/2023    HCT 41 5 05/08/2023    MCV 90 05/08/2023     05/08/2023     Lab Results   Component Value Date     04/06/2017    SODIUM 139 05/08/2023    K 4 7 05/08/2023     05/08/2023    CO2 30 05/08/2023    ANIONGAP 7 08/13/2015    AGAP 5 05/08/2023    BUN 16 05/08/2023    CREATININE 0 83 05/08/2023    GLUC 94 05/08/2023    GLUF 91 02/17/2022    CALCIUM 10 0 05/08/2023    AST 20 05/08/2023    ALT 21 05/08/2023    ALKPHOS 83 05/08/2023    PROT 6 7 04/06/2017    TP 7 3 05/08/2023    BILITOT 0 6 04/06/2017    TBILI 0 31 05/08/2023    EGFR 80 05/08/2023     Lab Results   Component Value Date    HGBA1C 5 2 08/20/2021     Lab Results   Component Value Date    [id number] 2 390 05/31/2022     Lab Results   Component Value Date    CHOLESTEROL 283 (H) 02/10/2022     Lab Results   Component Value Date    HDL 69 02/10/2022     Lab Results   Component Value Date    TRIG 132 02/10/2022     Lab Results   Component Value Date    LDLCALC 188 (H) 02/10/2022

## 2023-05-19 ENCOUNTER — OFFICE VISIT (OUTPATIENT)
Dept: GASTROENTEROLOGY | Facility: CLINIC | Age: 53
End: 2023-05-19

## 2023-05-19 VITALS
WEIGHT: 175 LBS | HEIGHT: 65 IN | BODY MASS INDEX: 29.16 KG/M2 | HEART RATE: 66 BPM | OXYGEN SATURATION: 98 % | DIASTOLIC BLOOD PRESSURE: 70 MMHG | SYSTOLIC BLOOD PRESSURE: 112 MMHG

## 2023-05-19 DIAGNOSIS — R10.32 LEFT LOWER QUADRANT ABDOMINAL PAIN: ICD-10-CM

## 2023-05-19 DIAGNOSIS — R93.3 ABNORMAL CT SCAN, COLON: ICD-10-CM

## 2023-05-19 DIAGNOSIS — Z86.010 HISTORY OF COLON POLYPS: ICD-10-CM

## 2023-05-19 DIAGNOSIS — K57.92 DIVERTICULITIS: Primary | ICD-10-CM

## 2023-05-19 NOTE — PATIENT INSTRUCTIONS
Scheduled date of colonoscopy (as of today): 8/9/23  Physician performing colonoscopy: Trent  [facility] of colonoscopy: [facility]  Bowel prep reviewed with patient: Keyon/Nash  Instructions reviewed with patient by: Neal MASCORRO  Clearances:
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
## 2023-05-22 PROBLEM — H10.13 ALLERGIC CONJUNCTIVITIS OF BOTH EYES: Status: ACTIVE | Noted: 2023-05-22

## 2023-05-22 PROBLEM — J30.89 PERENNIAL ALLERGIC RHINITIS: Status: ACTIVE | Noted: 2023-05-22

## 2023-05-23 ENCOUNTER — TELEPHONE (OUTPATIENT)
Dept: OBGYN CLINIC | Facility: CLINIC | Age: 53
End: 2023-05-23

## 2023-06-06 DIAGNOSIS — M79.642 BILATERAL HAND PAIN: ICD-10-CM

## 2023-06-06 DIAGNOSIS — M79.641 BILATERAL HAND PAIN: ICD-10-CM

## 2023-06-06 RX ORDER — IBUPROFEN 800 MG/1
TABLET ORAL
Qty: 90 TABLET | Refills: 1 | Status: SHIPPED | OUTPATIENT
Start: 2023-06-06

## 2023-06-14 ENCOUNTER — CLINICAL SUPPORT (OUTPATIENT)
Dept: BARIATRICS | Facility: CLINIC | Age: 53
End: 2023-06-14

## 2023-06-14 VITALS — WEIGHT: 175 LBS | BODY MASS INDEX: 29.16 KG/M2 | HEIGHT: 65 IN

## 2023-06-14 DIAGNOSIS — R63.5 ABNORMAL WEIGHT GAIN: Primary | ICD-10-CM

## 2023-06-14 PROCEDURE — RECHECK

## 2023-07-21 PROBLEM — H10.13 ALLERGIC CONJUNCTIVITIS OF BOTH EYES: Status: RESOLVED | Noted: 2023-05-22 | Resolved: 2023-07-21

## 2023-08-01 DIAGNOSIS — M79.642 BILATERAL HAND PAIN: ICD-10-CM

## 2023-08-01 DIAGNOSIS — M79.641 BILATERAL HAND PAIN: ICD-10-CM

## 2023-08-01 RX ORDER — IBUPROFEN 800 MG/1
TABLET ORAL
Qty: 90 TABLET | Refills: 1 | Status: SHIPPED | OUTPATIENT
Start: 2023-08-01

## 2023-09-11 PROBLEM — S62.609A FRACTURE OF PHALANX OF FINGER: Status: ACTIVE | Noted: 2023-09-03

## 2023-09-11 PROBLEM — Z87.898 HISTORY OF HEARTBURN: Status: ACTIVE | Noted: 2020-07-15

## 2023-09-11 PROBLEM — K25.9 GASTRIC ULCER: Status: ACTIVE | Noted: 2020-07-15

## 2023-09-30 DIAGNOSIS — M79.641 BILATERAL HAND PAIN: ICD-10-CM

## 2023-09-30 DIAGNOSIS — M79.642 BILATERAL HAND PAIN: ICD-10-CM

## 2023-10-02 ENCOUNTER — TELEPHONE (OUTPATIENT)
Dept: ADMINISTRATIVE | Facility: OTHER | Age: 53
End: 2023-10-02

## 2023-10-02 RX ORDER — IBUPROFEN 800 MG/1
TABLET ORAL
Qty: 90 TABLET | Refills: 1 | Status: SHIPPED | OUTPATIENT
Start: 2023-10-02

## 2023-10-02 NOTE — TELEPHONE ENCOUNTER
----- Message from Christoph Reddy MA sent at 10/2/2023  9:24 AM EDT -----  Regarding: Care Gap Request  10/02/23 9:24 AM    Hello, our patient Deshawn Smith has had Mammogram completed/performed. Please assist in updating the patient chart by pulling the Care Everywhere (CE) document. The date of service is 9/28/2023.      Thank you,  Macey BARGER

## 2023-10-02 NOTE — TELEPHONE ENCOUNTER
Upon review of the In Basket request we were able to locate, review, and update the patient chart as requested for DEXA Scan and Mammogram.    Any additional questions or concerns should be emailed to the Practice Liaisons via the appropriate education email address, please do not reply via In Basket.     Thank you  Chago Hammond

## 2023-11-28 DIAGNOSIS — M79.641 BILATERAL HAND PAIN: ICD-10-CM

## 2023-11-28 DIAGNOSIS — M79.642 BILATERAL HAND PAIN: ICD-10-CM

## 2023-11-28 RX ORDER — IBUPROFEN 800 MG/1
TABLET ORAL
Qty: 90 TABLET | Refills: 1 | Status: SHIPPED | OUTPATIENT
Start: 2023-11-28

## 2024-01-29 DIAGNOSIS — M79.642 BILATERAL HAND PAIN: ICD-10-CM

## 2024-01-29 DIAGNOSIS — M79.641 BILATERAL HAND PAIN: ICD-10-CM

## 2024-01-29 RX ORDER — IBUPROFEN 800 MG/1
TABLET ORAL
Qty: 90 TABLET | Refills: 1 | Status: SHIPPED | OUTPATIENT
Start: 2024-01-29

## 2024-02-05 ENCOUNTER — OFFICE VISIT (OUTPATIENT)
Dept: FAMILY MEDICINE CLINIC | Facility: CLINIC | Age: 54
End: 2024-02-05
Payer: COMMERCIAL

## 2024-02-05 VITALS
TEMPERATURE: 97.6 F | DIASTOLIC BLOOD PRESSURE: 96 MMHG | HEART RATE: 75 BPM | OXYGEN SATURATION: 96 % | SYSTOLIC BLOOD PRESSURE: 148 MMHG | BODY MASS INDEX: 29.66 KG/M2 | WEIGHT: 178 LBS | HEIGHT: 65 IN

## 2024-02-05 DIAGNOSIS — I10 ESSENTIAL HYPERTENSION: Primary | ICD-10-CM

## 2024-02-05 PROBLEM — M79.641 BILATERAL HAND PAIN: Status: RESOLVED | Noted: 2022-12-20 | Resolved: 2024-02-05

## 2024-02-05 PROBLEM — L60.3 BRITTLE NAILS: Status: RESOLVED | Noted: 2022-12-20 | Resolved: 2024-02-05

## 2024-02-05 PROBLEM — M79.642 BILATERAL HAND PAIN: Status: RESOLVED | Noted: 2022-12-20 | Resolved: 2024-02-05

## 2024-02-05 PROBLEM — E66.3 OVERWEIGHT: Status: RESOLVED | Noted: 2023-05-17 | Resolved: 2024-02-05

## 2024-02-05 PROBLEM — M25.50 ARTHRALGIA: Status: RESOLVED | Noted: 2022-05-27 | Resolved: 2024-02-05

## 2024-02-05 PROCEDURE — 99214 OFFICE O/P EST MOD 30 MIN: CPT | Performed by: FAMILY MEDICINE

## 2024-02-05 RX ORDER — DOXYCYCLINE HYCLATE 100 MG
100 TABLET ORAL 2 TIMES DAILY
COMMUNITY
Start: 2024-01-02

## 2024-02-05 RX ORDER — HYDROCHLOROTHIAZIDE 12.5 MG/1
12.5 TABLET ORAL DAILY
Qty: 30 TABLET | Refills: 1 | Status: SHIPPED | OUTPATIENT
Start: 2024-02-05 | End: 2024-03-06

## 2024-02-05 RX ORDER — POLYETHYLENE GLYCOL 3350 17 G
POWDER IN PACKET (EA) ORAL
COMMUNITY
Start: 2023-12-04
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
## 2024-03-18 ENCOUNTER — OFFICE VISIT (OUTPATIENT)
Dept: BARIATRICS | Facility: CLINIC | Age: 54
End: 2024-03-18
Payer: COMMERCIAL

## 2024-03-18 VITALS
WEIGHT: 188 LBS | SYSTOLIC BLOOD PRESSURE: 136 MMHG | DIASTOLIC BLOOD PRESSURE: 80 MMHG | BODY MASS INDEX: 30.22 KG/M2 | RESPIRATION RATE: 14 BRPM | HEART RATE: 74 BPM | HEIGHT: 66 IN

## 2024-03-18 DIAGNOSIS — I10 PRIMARY HYPERTENSION: ICD-10-CM

## 2024-03-18 DIAGNOSIS — E66.09 CLASS 1 OBESITY DUE TO EXCESS CALORIES WITH SERIOUS COMORBIDITY AND BODY MASS INDEX (BMI) OF 30.0 TO 30.9 IN ADULT: Primary | ICD-10-CM

## 2024-03-18 PROCEDURE — 99214 OFFICE O/P EST MOD 30 MIN: CPT | Performed by: FAMILY MEDICINE

## 2024-03-18 RX ORDER — PHENTERMINE HYDROCHLORIDE 15 MG/1
15 CAPSULE ORAL EVERY MORNING
Qty: 30 CAPSULE | Refills: 0 | Status: SHIPPED | OUTPATIENT
Start: 2024-03-18
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
## 2024-03-19 ENCOUNTER — TELEPHONE (OUTPATIENT)
Age: 54
End: 2024-03-19

## 2024-03-19 NOTE — TELEPHONE ENCOUNTER
PA for Phentermine 15mg tablets    Submitted via    []CMM-KEY   [x]Mareyl-Case ID # [id number]   []Faxed to plan   []Other website   []Phone call Case ID #     Office notes sent, clinical questions answered. Awaiting determination    Turnaround time for your insurance to make a decision on your Prior Authorization can take 7-21 business days.

## 2024-03-23 NOTE — TELEPHONE ENCOUNTER
PA for phentermine Denied    Reason:        Message sent to office clinical pool Yes    Denial letter scanned into Media Yes    Appeal started No

## 2024-03-25 ENCOUNTER — TELEPHONE (OUTPATIENT)
Dept: NEUROLOGY | Facility: CLINIC | Age: 54
End: 2024-03-25

## 2024-03-25 NOTE — TELEPHONE ENCOUNTER
Patient is requesting a copy of their EMG results from 12/20/22    Patient asking that they send the results to the current  address we have on file for the patient     Please assist

## 2024-03-28 ENCOUNTER — TELEPHONE (OUTPATIENT)
Dept: FAMILY MEDICINE CLINIC | Facility: CLINIC | Age: 54
End: 2024-03-28

## 2024-03-28 NOTE — TELEPHONE ENCOUNTER
Patient called office asking if you would be able to put a referral in her chart for [facility] - [facility] is covered by her insurance, [facility] is not.

## 2024-03-31 DIAGNOSIS — I10 ESSENTIAL HYPERTENSION: ICD-10-CM

## 2024-04-01 RX ORDER — HYDROCHLOROTHIAZIDE 12.5 MG/1
12.5 TABLET ORAL DAILY
Qty: 30 TABLET | Refills: 1 | Status: SHIPPED | OUTPATIENT
Start: 2024-04-01

## 2024-04-02 DIAGNOSIS — M79.641 BILATERAL HAND PAIN: ICD-10-CM

## 2024-04-02 DIAGNOSIS — M79.642 BILATERAL HAND PAIN: ICD-10-CM

## 2024-04-02 RX ORDER — IBUPROFEN 800 MG/1
800 TABLET ORAL EVERY 8 HOURS PRN
Qty: 90 TABLET | Refills: 0 | Status: SHIPPED | OUTPATIENT
Start: 2024-04-02

## 2024-04-03 DIAGNOSIS — Z00.6 ENCOUNTER FOR EXAMINATION FOR NORMAL COMPARISON OR CONTROL IN CLINICAL RESEARCH PROGRAM: ICD-10-CM

## 2024-04-05 ENCOUNTER — APPOINTMENT (OUTPATIENT)
Dept: LAB | Facility: CLINIC | Age: 54
End: 2024-04-05

## 2024-04-05 DIAGNOSIS — Z00.6 ENCOUNTER FOR EXAMINATION FOR NORMAL COMPARISON OR CONTROL IN CLINICAL RESEARCH PROGRAM: ICD-10-CM

## 2024-04-05 LAB
ANION GAP SERPL CALCULATED.3IONS-SCNC: 10 MMOL/L (ref 3–11)
BUN SERPL-MCNC: 22 MG/DL (ref 7–25)
CALCIUM SERPL-MCNC: 9.8 MG/DL (ref 8.5–10.1)
CHLORIDE SERPL-SCNC: 103 MMOL/L (ref 100–109)
CO2 SERPL-SCNC: 29 MMOL/L (ref 21–31)
CREAT SERPL-MCNC: 0.89 MG/DL (ref 0.4–1.1)
CYTOLOGY CMNT CVX/VAG CYTO-IMP: NORMAL
GFR/BSA.PRED SERPLBLD CYS-BASED-ARV: 76 ML/MIN/{1.73_M2}
GLUCOSE SERPL-MCNC: 89 MG/DL (ref 65–99)
POTASSIUM SERPL-SCNC: 4.6 MMOL/L (ref 3.5–5.2)
SODIUM SERPL-SCNC: 142 MMOL/L (ref 135–145)

## 2024-04-05 PROCEDURE — 36415 COLL VENOUS BLD VENIPUNCTURE: CPT

## 2024-04-22 ENCOUNTER — OFFICE VISIT (OUTPATIENT)
Dept: FAMILY MEDICINE CLINIC | Facility: CLINIC | Age: 54
End: 2024-04-22
Payer: COMMERCIAL

## 2024-04-22 VITALS
SYSTOLIC BLOOD PRESSURE: 134 MMHG | HEART RATE: 76 BPM | DIASTOLIC BLOOD PRESSURE: 90 MMHG | WEIGHT: 190 LBS | HEIGHT: 67 IN | BODY MASS INDEX: 29.82 KG/M2 | TEMPERATURE: 97.3 F | OXYGEN SATURATION: 96 %

## 2024-04-22 DIAGNOSIS — I10 PRIMARY HYPERTENSION: Primary | ICD-10-CM

## 2024-04-22 DIAGNOSIS — E66.3 OVERWEIGHT (BMI 25.0-29.9): ICD-10-CM

## 2024-04-22 DIAGNOSIS — Z13.220 NEED FOR LIPID SCREENING: ICD-10-CM

## 2024-04-22 DIAGNOSIS — Z13.1 SCREENING FOR DIABETES MELLITUS: ICD-10-CM

## 2024-04-22 PROBLEM — Z87.898 HISTORY OF HEARTBURN: Status: RESOLVED | Noted: 2020-07-15 | Resolved: 2024-04-22

## 2024-04-22 PROBLEM — R41.3 MEMORY LOSS: Status: RESOLVED | Noted: 2022-05-27 | Resolved: 2024-04-22

## 2024-04-22 PROBLEM — R29.898 BILATERAL ARM WEAKNESS: Status: RESOLVED | Noted: 2022-05-27 | Resolved: 2024-04-22

## 2024-04-22 PROBLEM — K57.92 DIVERTICULITIS: Status: RESOLVED | Noted: 2023-05-17 | Resolved: 2024-04-22

## 2024-04-22 PROBLEM — R42 VERTIGO: Status: RESOLVED | Noted: 2022-02-11 | Resolved: 2024-04-22

## 2024-04-22 PROCEDURE — 99213 OFFICE O/P EST LOW 20 MIN: CPT | Performed by: FAMILY MEDICINE

## 2024-04-22 RX ORDER — PANTOPRAZOLE SODIUM 20 MG/1
TABLET, DELAYED RELEASE ORAL
COMMUNITY
Start: 2024-04-03
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
## 2024-04-23 ENCOUNTER — CLINICAL SUPPORT (OUTPATIENT)
Dept: BARIATRICS | Facility: CLINIC | Age: 54
End: 2024-04-23

## 2024-04-23 VITALS
HEIGHT: 66 IN | HEART RATE: 75 BPM | DIASTOLIC BLOOD PRESSURE: 80 MMHG | RESPIRATION RATE: 16 BRPM | WEIGHT: 188.2 LBS | BODY MASS INDEX: 30.25 KG/M2 | SYSTOLIC BLOOD PRESSURE: 118 MMHG

## 2024-04-23 DIAGNOSIS — E66.9 OBESITY, CLASS II, BMI 35-39.9: Primary | ICD-10-CM

## 2024-04-23 PROCEDURE — RECHECK

## 2024-04-25 ENCOUNTER — TELEPHONE (OUTPATIENT)
Age: 54
End: 2024-04-25

## 2024-04-25 NOTE — TELEPHONE ENCOUNTER
Pt called in requesting refill for phentermine 15 MG. Pt stated is doing good with current dose and is ready for an upgrade. If needed.

## 2024-04-28 LAB
APOB+LDLR+PCSK9 GENE MUT ANL BLD/T: NOT DETECTED
BRCA1+BRCA2 DEL+DUP + FULL MUT ANL BLD/T: NOT DETECTED
MLH1+MSH2+MSH6+PMS2 GN DEL+DUP+FUL M: NOT DETECTED

## 2024-05-01 DIAGNOSIS — E66.09 CLASS 1 OBESITY DUE TO EXCESS CALORIES WITH SERIOUS COMORBIDITY AND BODY MASS INDEX (BMI) OF 30.0 TO 30.9 IN ADULT: ICD-10-CM

## 2024-05-01 RX ORDER — PHENTERMINE HYDROCHLORIDE 15 MG/1
15 CAPSULE ORAL EVERY MORNING
Qty: 30 CAPSULE | Refills: 0 | Status: SHIPPED | OUTPATIENT
Start: 2024-05-01 | End: 2024-05-02

## 2024-05-01 NOTE — TELEPHONE ENCOUNTER
----- Message from Tisha Duckworth sent at 5/1/2024  2:47 PM EDT -----  Regarding: RE: Phentermine  Contact: [phone]  Pt's message, thanks.    ----- Message -----  From: Yolanda Kumar  Sent: 5/1/2024   2:36 PM EDT  To: [facility]  Subject: Phentermine                                      I did not renew as I had no refills.

## 2024-05-02 DIAGNOSIS — E66.3 OVERWEIGHT (BMI 25.0-29.9): Primary | ICD-10-CM

## 2024-05-21 DIAGNOSIS — K57.92 DIVERTICULITIS: Primary | ICD-10-CM

## 2024-05-21 RX ORDER — METRONIDAZOLE 500 MG/1
500 TABLET ORAL EVERY 8 HOURS SCHEDULED
Qty: 21 TABLET | Refills: 0 | Status: SHIPPED | OUTPATIENT
Start: 2024-05-21 | End: 2024-05-28

## 2024-05-22 PROBLEM — Z13.220 NEED FOR LIPID SCREENING: Status: RESOLVED | Noted: 2024-04-22 | Resolved: 2024-05-22

## 2024-05-22 PROBLEM — Z13.1 SCREENING FOR DIABETES MELLITUS: Status: RESOLVED | Noted: 2024-04-22 | Resolved: 2024-05-22

## 2024-05-23 ENCOUNTER — CLINICAL SUPPORT (OUTPATIENT)
Dept: BARIATRICS | Facility: CLINIC | Age: 54
End: 2024-05-23

## 2024-05-23 ENCOUNTER — TELEPHONE (OUTPATIENT)
Dept: BARIATRICS | Facility: CLINIC | Age: 54
End: 2024-05-23

## 2024-05-23 VITALS
RESPIRATION RATE: 18 BRPM | DIASTOLIC BLOOD PRESSURE: 86 MMHG | OXYGEN SATURATION: 98 % | SYSTOLIC BLOOD PRESSURE: 130 MMHG | WEIGHT: 181 LBS | HEART RATE: 80 BPM | BODY MASS INDEX: 29.09 KG/M2 | HEIGHT: 66 IN

## 2024-05-23 DIAGNOSIS — E66.9 OBESITY, UNSPECIFIED: Primary | ICD-10-CM

## 2024-05-23 PROCEDURE — RECHECK

## 2024-05-23 NOTE — PROGRESS NOTES
Patient here today for weight check. Currently on Phentermine 15mg. Reports no side effects. No palpations, nausea, muscle ache, bowel issues. Last weight check 3/18/24 weighed 188 pounds. Today weighs 181 pounds. Has lost  7.0 pounds. Feels fine. Patient requesting a refill.

## 2024-05-23 NOTE — TELEPHONE ENCOUNTER
Good Day,    Patient here today for weight check. Currently on Phentermine 15mg. Reports no side effects. No palpations, nausea, muscle ache, bowel issues. Last weight check 3/18/24 weighed 188 pounds. Today weighs 181 pounds. Has lost  7.0 pounds. Feels fine. Patient requesting a refill.    Thank you.

## 2024-05-23 NOTE — TELEPHONE ENCOUNTER
Thank you for the information. I appreciate it. Will inform patient while taking Qysmia not safe to take the Phentermine.

## 2024-05-25 DIAGNOSIS — I10 ESSENTIAL HYPERTENSION: ICD-10-CM

## 2024-05-25 RX ORDER — HYDROCHLOROTHIAZIDE 12.5 MG/1
12.5 TABLET ORAL DAILY
Qty: 30 TABLET | Refills: 5 | Status: SHIPPED | OUTPATIENT
Start: 2024-05-25

## 2024-05-27 DIAGNOSIS — E66.3 OVERWEIGHT (BMI 25.0-29.9): Primary | ICD-10-CM

## 2024-07-04 DIAGNOSIS — M79.642 BILATERAL HAND PAIN: ICD-10-CM

## 2024-07-04 DIAGNOSIS — M79.641 BILATERAL HAND PAIN: ICD-10-CM

## 2024-07-05 RX ORDER — IBUPROFEN 800 MG/1
TABLET ORAL
Qty: 100 TABLET | Refills: 1 | Status: SHIPPED | OUTPATIENT
Start: 2024-07-05

## 2024-12-05 DIAGNOSIS — I10 ESSENTIAL HYPERTENSION: ICD-10-CM

## 2024-12-05 RX ORDER — HYDROCHLOROTHIAZIDE 12.5 MG/1
12.5 TABLET ORAL DAILY
Qty: 30 TABLET | Refills: 5 | Status: SHIPPED | OUTPATIENT
Start: 2024-12-05

## 2024-12-27 ENCOUNTER — TELEPHONE (OUTPATIENT)
Age: 54
End: 2024-12-27

## 2024-12-27 ENCOUNTER — OFFICE VISIT (OUTPATIENT)
Dept: FAMILY MEDICINE CLINIC | Facility: CLINIC | Age: 54
End: 2024-12-27
Payer: COMMERCIAL

## 2024-12-27 VITALS
DIASTOLIC BLOOD PRESSURE: 90 MMHG | HEIGHT: 66 IN | TEMPERATURE: 98.3 F | WEIGHT: 184 LBS | BODY MASS INDEX: 29.57 KG/M2 | SYSTOLIC BLOOD PRESSURE: 136 MMHG | OXYGEN SATURATION: 95 % | HEART RATE: 72 BPM

## 2024-12-27 DIAGNOSIS — J45.41 MODERATE PERSISTENT ASTHMA WITH EXACERBATION: ICD-10-CM

## 2024-12-27 DIAGNOSIS — L01.00 IMPETIGO: ICD-10-CM

## 2024-12-27 DIAGNOSIS — J45.41 MODERATE PERSISTENT ASTHMA WITH EXACERBATION: Primary | ICD-10-CM

## 2024-12-27 PROCEDURE — 99214 OFFICE O/P EST MOD 30 MIN: CPT | Performed by: FAMILY MEDICINE

## 2024-12-27 RX ORDER — MUPIROCIN 20 MG/G
OINTMENT TOPICAL 3 TIMES DAILY
Qty: 30 G | Refills: 2 | Status: SHIPPED | OUTPATIENT
Start: 2024-12-27

## 2024-12-27 RX ORDER — FLUTICASONE FUROATE AND VILANTEROL TRIFENATATE 100; 25 UG/1; UG/1
1 POWDER RESPIRATORY (INHALATION) DAILY
Qty: 60 BLISTER | Refills: 2 | Status: SHIPPED | OUTPATIENT
Start: 2024-12-27 | End: 2025-01-26

## 2024-12-27 RX ORDER — PREDNISONE 20 MG/1
TABLET ORAL
Qty: 20 TABLET | Refills: 0 | Status: SHIPPED | OUTPATIENT
Start: 2024-12-27

## 2024-12-27 RX ORDER — SULFAMETHOXAZOLE AND TRIMETHOPRIM 800; 160 MG/1; MG/1
1 TABLET ORAL EVERY 12 HOURS SCHEDULED
Qty: 14 TABLET | Refills: 0 | Status: SHIPPED | OUTPATIENT
Start: 2024-12-27 | End: 2025-01-03

## 2024-12-27 RX ORDER — FLUTICASONE FUROATE AND VILANTEROL 100; 25 UG/1; UG/1
1 POWDER RESPIRATORY (INHALATION) DAILY
Qty: 60 BLISTER | Refills: 2 | Status: SHIPPED | OUTPATIENT
Start: 2024-12-27 | End: 2024-12-27

## 2024-12-27 RX ORDER — ALBUTEROL SULFATE 0.83 MG/ML
2.5 SOLUTION RESPIRATORY (INHALATION) EVERY 6 HOURS PRN
Qty: 240 ML | Refills: 3 | Status: SHIPPED | OUTPATIENT
Start: 2024-12-27 | End: 2025-01-26

## 2024-12-27 RX ORDER — DOXYCYCLINE HYCLATE 100 MG
100 TABLET ORAL 2 TIMES DAILY
COMMUNITY
Start: 2024-12-22 | End: 2025-01-01

## 2024-12-27 NOTE — PROGRESS NOTES
Name: Yolanda Kumar      : 1970      MRN: [id number]  Encounter Provider: Boyd Brooks MD  Encounter Date: 2024   Encounter department: [facility]  :  Assessment & Plan  Moderate persistent asthma with exacerbation  After discussing risks and benefits of medication along with side effects will start the following:   Orders:  •  albuterol (2.5 mg/3 mL) 0.083 % nebulizer solution; Take 3 mL (2.5 mg total) by nebulization every 6 (six) hours as needed for wheezing or shortness of breath  •  Fluticasone Furoate-Vilanterol 100-25 mcg/actuation inhaler; Inhale 1 puff daily Rinse mouth after use.  •  predniSONE 20 mg tablet; Take 60 mg for 3 days then take 40 mg for 3 days then 20 mg for 3 days    Impetigo    Orders:  •  mupirocin (BACTROBAN) 2 % ointment; Apply topically 3 (three) times a day  •  sulfamethoxazole-trimethoprim (BACTRIM DS) 800-160 mg per tablet; Take 1 tablet by mouth every 12 (twelve) hours for 7 days    Follow up as needed       History of Present Illness     Patient is here due to cough and wheezing for the past several days. She has been to express care/urgent care twice over the past several weeks due to on going symptoms. She has a Hx of asthma and has been prescribed prednisone and currently finishing doxycycline which has not been helping. CXR done normal, negative flu and Covid swab.  Also has a lesion underneath right nostril.      Review of Systems   Constitutional:  Negative for activity change, appetite change, fatigue and fever.   HENT:  Negative for congestion and ear discharge.    Respiratory:  Positive for cough, shortness of breath and wheezing.    Cardiovascular:  Negative for chest pain and palpitations.   Gastrointestinal:  Negative for diarrhea and nausea.   Musculoskeletal:  Negative for arthralgias and back pain.   Skin:  Positive for color change. Negative for rash.   Neurological:  Negative for dizziness and headaches.  "  Psychiatric/Behavioral:  Negative for agitation and behavioral problems.        Objective   /90   Pulse 72   Temp 98.3 °F (36.8 °C)   Ht 5' 6\" (1.676 m)   Wt 83.5 kg (184 lb)   SpO2 95%   BMI 29.70 kg/m²      Physical Exam  Vitals and nursing note reviewed.   Constitutional:       General: She is not in acute distress.     Appearance: She is well-developed.   HENT:      Head: Normocephalic and atraumatic.      Nose:      Comments: Vesicular lesions noted underneath right nostril  Eyes:      Conjunctiva/sclera: Conjunctivae normal.   Cardiovascular:      Rate and Rhythm: Normal rate and regular rhythm.      Heart sounds: No murmur heard.  Pulmonary:      Effort: Pulmonary effort is normal. No respiratory distress.      Breath sounds: Normal breath sounds.   Abdominal:      Palpations: Abdomen is soft.      Tenderness: There is no abdominal tenderness.   Musculoskeletal:         General: No swelling.      Cervical back: Neck supple.   Skin:     General: Skin is warm and dry.      Capillary Refill: Capillary refill takes less than 2 seconds.   Neurological:      Mental Status: She is alert.   Psychiatric:         Mood and Affect: Mood normal.         "

## 2024-12-27 NOTE — LETTER
December 27, 2024     Patient: Yolanda Kumar  YOB: 1970  Date of Visit: 12/27/2024      To Whom it May Concern:    Yolanda Kumar is under my professional care. Yolanda was seen in my office on 12/27/2024. Yolanda may return to work on 12/31/2024 .    If you have any questions or concerns, please don't hesitate to call.         Sincerely,          Boyd Brooks MD        CC: No Recipients

## 2024-12-27 NOTE — ASSESSMENT & PLAN NOTE
After discussing risks and benefits of medication along with side effects will start the following:   Orders:  •  albuterol (2.5 mg/3 mL) 0.083 % nebulizer solution; Take 3 mL (2.5 mg total) by nebulization every 6 (six) hours as needed for wheezing or shortness of breath  •  Fluticasone Furoate-Vilanterol 100-25 mcg/actuation inhaler; Inhale 1 puff daily Rinse mouth after use.  •  predniSONE 20 mg tablet; Take 60 mg for 3 days then take 40 mg for 3 days then 20 mg for 3 days

## 2024-12-27 NOTE — ASSESSMENT & PLAN NOTE
Orders:  •  mupirocin (BACTROBAN) 2 % ointment; Apply topically 3 (three) times a day  •  sulfamethoxazole-trimethoprim (BACTRIM DS) 800-160 mg per tablet; Take 1 tablet by mouth every 12 (twelve) hours for 7 days

## 2024-12-27 NOTE — TELEPHONE ENCOUNTER
Pt called and stated she has a coupon for the Breo inhaler please resend as Brand necessary so that she can use the coupon instead of pay $270 she will only pay $35

## 2025-01-03 ENCOUNTER — OFFICE VISIT (OUTPATIENT)
Dept: FAMILY MEDICINE CLINIC | Facility: CLINIC | Age: 55
End: 2025-01-03
Payer: COMMERCIAL

## 2025-01-03 VITALS
BODY MASS INDEX: 29.89 KG/M2 | HEART RATE: 76 BPM | OXYGEN SATURATION: 98 % | TEMPERATURE: 98.7 F | SYSTOLIC BLOOD PRESSURE: 148 MMHG | WEIGHT: 186 LBS | HEIGHT: 66 IN | DIASTOLIC BLOOD PRESSURE: 90 MMHG

## 2025-01-03 DIAGNOSIS — B02.9 HERPES ZOSTER WITHOUT COMPLICATION: Primary | ICD-10-CM

## 2025-01-03 DIAGNOSIS — J45.41 MODERATE PERSISTENT ASTHMA WITH EXACERBATION: ICD-10-CM

## 2025-01-03 DIAGNOSIS — L01.00 IMPETIGO: ICD-10-CM

## 2025-01-03 PROCEDURE — 99214 OFFICE O/P EST MOD 30 MIN: CPT | Performed by: FAMILY MEDICINE

## 2025-01-03 RX ORDER — VALACYCLOVIR HYDROCHLORIDE 1 G/1
1000 TABLET, FILM COATED ORAL 3 TIMES DAILY
Qty: 21 TABLET | Refills: 0 | Status: SHIPPED | OUTPATIENT
Start: 2025-01-03 | End: 2025-01-10

## 2025-01-03 RX ORDER — CEPHALEXIN 500 MG/1
500 CAPSULE ORAL 3 TIMES DAILY
Qty: 21 CAPSULE | Refills: 0 | Status: SHIPPED | OUTPATIENT
Start: 2025-01-03 | End: 2025-01-10

## 2025-01-03 NOTE — ASSESSMENT & PLAN NOTE
After discussing risks and benefits of medication along with side effects will start the following:   Orders:  •  valACYclovir (VALTREX) 1,000 mg tablet; Take 1 tablet (1,000 mg total) by mouth 3 (three) times a day for 7 days

## 2025-01-03 NOTE — ASSESSMENT & PLAN NOTE
Orders:  •  cephalexin (KEFLEX) 500 mg capsule; Take 1 capsule (500 mg total) by mouth 3 (three) times a day for 7 days

## 2025-01-03 NOTE — PROGRESS NOTES
"Name: Yolanda Kumar      : 1970      MRN: [id number]  Encounter Provider: Boyd Brooks MD  Encounter Date: 1/3/2025   Encounter department: [facility]  :  Assessment & Plan  Herpes zoster without complication  After discussing risks and benefits of medication along with side effects will start the following:   Orders:  •  valACYclovir (VALTREX) 1,000 mg tablet; Take 1 tablet (1,000 mg total) by mouth 3 (three) times a day for 7 days    Impetigo    Orders:  •  cephalexin (KEFLEX) 500 mg capsule; Take 1 capsule (500 mg total) by mouth 3 (three) times a day for 7 days    Moderate persistent asthma with exacerbation  Continue inhalers as needed    Follow up in 1-2 weeks or as needed              History of Present Illness     Patient is here to follow up for a rash just below right nostril. The lesion has been present for 1 week. Does have pain associated with it. She has been trying bactrim tablets and mupirocin topical which have not helped her with her rash thus far.      Review of Systems   Constitutional:  Negative for activity change, appetite change, fatigue and fever.   HENT:  Negative for congestion and ear discharge.    Respiratory:  Negative for cough and shortness of breath.    Cardiovascular:  Negative for chest pain and palpitations.   Gastrointestinal:  Negative for diarrhea and nausea.   Musculoskeletal:  Negative for arthralgias and back pain.   Skin:  Positive for color change and rash.   Neurological:  Negative for dizziness and headaches.   Psychiatric/Behavioral:  Negative for agitation and behavioral problems.        Objective   /90   Pulse 76   Temp 98.7 °F (37.1 °C)   Ht 5' 6\" (1.676 m)   Wt 84.4 kg (186 lb)   SpO2 98%   BMI 30.02 kg/m²      Physical Exam  Vitals and nursing note reviewed.   Constitutional:       General: She is not in acute distress.     Appearance: She is well-developed.   HENT:      Head: Normocephalic and atraumatic.   Eyes:      " Conjunctiva/sclera: Conjunctivae normal.   Cardiovascular:      Rate and Rhythm: Normal rate and regular rhythm.      Heart sounds: No murmur heard.  Pulmonary:      Effort: Pulmonary effort is normal. No respiratory distress.      Breath sounds: Normal breath sounds.   Abdominal:      Palpations: Abdomen is soft.      Tenderness: There is no abdominal tenderness.   Musculoskeletal:         General: No swelling.      Cervical back: Neck supple.   Skin:     General: Skin is warm and dry.      Capillary Refill: Capillary refill takes less than 2 seconds.      Comments: Vesicular likes lesion noted below right nostril   Neurological:      Mental Status: She is alert.   Psychiatric:         Mood and Affect: Mood normal.

## 2025-01-08 ENCOUNTER — TELEPHONE (OUTPATIENT)
Age: 55
End: 2025-01-08

## 2025-01-08 NOTE — TELEPHONE ENCOUNTER
Patient calling to check status on FMLA paperwork  Patient states she needs FMLA paperwork no later then 1/10/25 bc 1/13/25 if not received she will get fired.  Pt is asking to please call her back when its completed.    Please Advise, and notify patient, thank you    Yolanda - [phone]

## 2025-01-10 ENCOUNTER — OFFICE VISIT (OUTPATIENT)
Dept: FAMILY MEDICINE CLINIC | Facility: CLINIC | Age: 55
End: 2025-01-10
Payer: COMMERCIAL

## 2025-01-10 VITALS
BODY MASS INDEX: 29.89 KG/M2 | WEIGHT: 186 LBS | OXYGEN SATURATION: 98 % | DIASTOLIC BLOOD PRESSURE: 76 MMHG | HEIGHT: 66 IN | SYSTOLIC BLOOD PRESSURE: 108 MMHG | TEMPERATURE: 97.8 F | HEART RATE: 76 BPM

## 2025-01-10 DIAGNOSIS — L01.00 IMPETIGO: ICD-10-CM

## 2025-01-10 DIAGNOSIS — J45.40 MODERATE PERSISTENT ASTHMA WITHOUT COMPLICATION: Primary | ICD-10-CM

## 2025-01-10 DIAGNOSIS — B02.9 HERPES ZOSTER WITHOUT COMPLICATION: ICD-10-CM

## 2025-01-10 DIAGNOSIS — I10 ESSENTIAL HYPERTENSION: ICD-10-CM

## 2025-01-10 PROCEDURE — 99213 OFFICE O/P EST LOW 20 MIN: CPT | Performed by: FAMILY MEDICINE

## 2025-01-10 RX ORDER — TIRZEPATIDE 7.5 MG/.5ML
7.5 INJECTION, SOLUTION SUBCUTANEOUS WEEKLY
COMMUNITY
Start: 2025-01-08

## 2025-01-10 RX ORDER — HYDROCHLOROTHIAZIDE 12.5 MG/1
12.5 TABLET ORAL DAILY
Qty: 90 TABLET | Refills: 5 | Status: SHIPPED | OUTPATIENT
Start: 2025-01-10

## 2025-01-10 NOTE — ASSESSMENT & PLAN NOTE
Stable controlled  Orders:  •  hydroCHLOROthiazide 12.5 mg tablet; Take 1 tablet (12.5 mg total) by mouth daily

## 2025-01-10 NOTE — PROGRESS NOTES
"Name: Yolanda Kumar      : 1970      MRN: [id number]  Encounter Provider: Boyd Brooks MD  Encounter Date: 1/10/2025   Encounter department: [facility]  :  Assessment & Plan  Moderate persistent asthma without complication  Stable symptoms improved       Essential hypertension  Stable controlled  Orders:  •  hydroCHLOROthiazide 12.5 mg tablet; Take 1 tablet (12.5 mg total) by mouth daily    Impetigo  resolved       Herpes zoster without complication  resolved       Follow up in 6 months or as needed       History of Present Illness     Patient is here for a follow up. She has a Hx of asthma and had to miss work for several weeks due to shortness of breath. She is currently on Breo inhaler which is helping her.  Also she had shingles rash on her face which has now resolved.  Also has hypertension denies any symptoms related to this.      Review of Systems   Constitutional:  Negative for activity change, appetite change, fatigue and fever.   HENT:  Negative for congestion and ear discharge.    Respiratory:  Negative for cough and shortness of breath.    Cardiovascular:  Negative for chest pain and palpitations.   Gastrointestinal:  Negative for diarrhea and nausea.   Musculoskeletal:  Negative for arthralgias and back pain.   Skin:  Negative for color change and rash.   Neurological:  Negative for dizziness and headaches.   Psychiatric/Behavioral:  Negative for agitation and behavioral problems.        Objective   /76   Pulse 76   Temp 97.8 °F (36.6 °C)   Ht 5' 6\" (1.676 m)   Wt 84.4 kg (186 lb)   SpO2 98%   BMI 30.02 kg/m²      Physical Exam  Vitals and nursing note reviewed.   Constitutional:       General: She is not in acute distress.     Appearance: She is well-developed.   HENT:      Head: Normocephalic and atraumatic.   Eyes:      Conjunctiva/sclera: Conjunctivae normal.   Cardiovascular:      Rate and Rhythm: Normal rate and regular rhythm.      Heart sounds: No " murmur heard.  Pulmonary:      Effort: Pulmonary effort is normal. No respiratory distress.      Breath sounds: Normal breath sounds.   Abdominal:      Palpations: Abdomen is soft.      Tenderness: There is no abdominal tenderness.   Musculoskeletal:         General: No swelling.      Cervical back: Neck supple.   Skin:     General: Skin is warm and dry.      Capillary Refill: Capillary refill takes less than 2 seconds.   Neurological:      Mental Status: She is alert.   Psychiatric:         Mood and Affect: Mood normal.

## 2025-01-26 PROBLEM — L01.00 IMPETIGO: Status: RESOLVED | Noted: 2024-12-27 | Resolved: 2025-01-26

## 2025-03-31 ENCOUNTER — RESULTS FOLLOW-UP (OUTPATIENT)
Dept: FAMILY MEDICINE CLINIC | Facility: CLINIC | Age: 55
End: 2025-03-31

## 2025-03-31 LAB
ANION GAP SERPL CALCULATED.3IONS-SCNC: 9 MMOL/L (ref 3–11)
BUN SERPL-MCNC: 14 MG/DL (ref 7–25)
CALCIUM SERPL-MCNC: 9.6 MG/DL (ref 8.5–10.5)
CHLORIDE SERPL-SCNC: 103 MMOL/L (ref 100–109)
CHOLEST SERPL-MCNC: 221 MG/DL
CHOLEST/HDLC SERPL: 3.9 {RATIO}
CO2 SERPL-SCNC: 29 MMOL/L (ref 21–31)
CREAT SERPL-MCNC: 0.77 MG/DL (ref 0.4–1.1)
CYTOLOGY CMNT CVX/VAG CYTO-IMP: NORMAL
GFR/BSA.PRED SERPLBLD CYS-BASED-ARV: 90 ML/MIN/{1.73_M2}
GLUCOSE SERPL-MCNC: 85 MG/DL (ref 65–99)
HDLC SERPL-MCNC: 57 MG/DL (ref 23–92)
LDLC SERPL CALC-MCNC: 150 MG/DL
NONHDLC SERPL-MCNC: 164 MG/DL
POTASSIUM SERPL-SCNC: 3.8 MMOL/L (ref 3.5–5.2)
SODIUM SERPL-SCNC: 141 MMOL/L (ref 135–145)
TRIGL SERPL-MCNC: 71 MG/DL

## 2025-07-22 DIAGNOSIS — M79.642 BILATERAL HAND PAIN: ICD-10-CM

## 2025-07-22 DIAGNOSIS — M79.641 BILATERAL HAND PAIN: ICD-10-CM

## 2025-07-25 RX ORDER — IBUPROFEN 800 MG/1
800 TABLET, FILM COATED ORAL EVERY 8 HOURS PRN
Qty: 100 TABLET | Refills: 0 | Status: SHIPPED | OUTPATIENT
Start: 2025-07-25

## (undated) DEVICE — CYSTO TUBING SINGLE IRRIGATION

## (undated) DEVICE — GLOVE INDICATOR PI UNDERGLOVE SZ 7.5 BLUE

## (undated) DEVICE — IMPERVIOUS STOCKINETTE: Brand: DEROYAL

## (undated) DEVICE — NEEDLE 22 G X 1 1/2 SAFETY

## (undated) DEVICE — BETHLEHEM UNIVERSAL  ARTHRO PK: Brand: CARDINAL HEALTH

## (undated) DEVICE — 1820 FOAM BLOCK NEEDLE COUNTER: Brand: DEVON

## (undated) DEVICE — TUBING SUCTION 5MM X 12 FT

## (undated) DEVICE — SUT VICRYL 0 CT-1 27 IN J260H

## (undated) DEVICE — CHLORAPREP HI-LITE 26ML ORANGE

## (undated) DEVICE — UTERINE MANIPULATOR RUMI 5.1 X 6 CM

## (undated) DEVICE — ACE WRAP 6 IN UNSTERILE

## (undated) DEVICE — TRAY FOLEY 16FR URIMETER SILICONE SURESTEP

## (undated) DEVICE — GLOVE INDICATOR PI UNDERGLOVE SZ 8.5 BLUE

## (undated) DEVICE — GLOVE SRG BIOGEL 8.5

## (undated) DEVICE — Device

## (undated) DEVICE — MAYO STAND COVER: Brand: CONVERTORS

## (undated) DEVICE — GAUZE SPONGES,16 PLY: Brand: CURITY

## (undated) DEVICE — ADHESIVE SKIN HIGH VISCOSITY EXOFIN 1ML

## (undated) DEVICE — SUT MONOCRYL 3-0 PS-2 18 IN Y497G

## (undated) DEVICE — PLASTIC ADHESIVE BANDAGE: Brand: CURITY

## (undated) DEVICE — SYRINGE 20ML LL

## (undated) DEVICE — SYRINGE 50ML LL

## (undated) DEVICE — ENSEAL LAPAROSCOPIC TISSUE SEALER G2 STRAIGHT JAW FOR USE WITH G2 GENERATOR 5MM DIAMETER 35CM SHAFT LENGTH: Brand: ENSEAL

## (undated) DEVICE — INTENDED FOR TISSUE SEPARATION, AND OTHER PROCEDURES THAT REQUIRE A SHARP SURGICAL BLADE TO PUNCTURE OR CUT.: Brand: BARD-PARKER SAFETY BLADES SIZE 11, STERILE

## (undated) DEVICE — LIGHT GLOVE GREEN

## (undated) DEVICE — MEDI-VAC YANK SUCT HNDL W/TPRD BULBOUS TIP: Brand: CARDINAL HEALTH

## (undated) DEVICE — PENCIL ELECTROSURG E-Z CLEAN -0035H

## (undated) DEVICE — TROCAR: Brand: KII FIOS FIRST ENTRY

## (undated) DEVICE — SUT MONOCRYL 4-0 PS-2 18 IN Y496G

## (undated) DEVICE — PREMIUM DRY TRAY LF: Brand: MEDLINE INDUSTRIES, INC.

## (undated) DEVICE — IRRIG ENDO FLO TUBING

## (undated) DEVICE — INSUFLATION TUBING INSUFLOW (LEXION)

## (undated) DEVICE — INTENDED FOR TISSUE SEPARATION, AND OTHER PROCEDURES THAT REQUIRE A SHARP SURGICAL BLADE TO PUNCTURE OR CUT.: Brand: BARD-PARKER ® CARBON RIB-BACK BLADES

## (undated) DEVICE — BETHLEHEM UNIVERSAL GYN LAP PK: Brand: CARDINAL HEALTH

## (undated) DEVICE — BLADE SHAVER DISSECTOR 3.5MM 13CM COOLCUT

## (undated) DEVICE — 3M™ TEGADERM™ TRANSPARENT FILM DRESSING FRAME STYLE, 1624W, 2-3/8 IN X 2-3/4 IN (6 CM X 7 CM), 100/CT 4CT/CASE: Brand: 3M™ TEGADERM™

## (undated) DEVICE — VIAL DECANTER

## (undated) DEVICE — GLOVE PI ULTRA TOUCH SZ.7.5

## (undated) DEVICE — STRL COTTON TIP APPLCTR 6IN PK: Brand: CARDINAL HEALTH

## (undated) DEVICE — OCCLUDER COLPO-PNEUMO

## (undated) DEVICE — X-RAY DETECTABLE SPONGES,16 PLY: Brand: VISTEC

## (undated) DEVICE — PADDING CAST 4 IN  COTTON STRL

## (undated) DEVICE — CHLORHEXIDINE 4PCT 4 OZ

## (undated) DEVICE — ELECTRODE LAP SPATULA CRV E-Z CLEAN 33CM -0018C

## (undated) DEVICE — TUBING SMOKE EVAC W/FILTRATION DEVICE PLUMEPORT ACTIV

## (undated) DEVICE — SUT VICRYL 0 UR-6 27 IN J603H

## (undated) DEVICE — 3000CC GUARDIAN II: Brand: GUARDIAN

## (undated) DEVICE — TUBING ARTHROSCOPY REDUCE PATIENT

## (undated) DEVICE — TROCAR: Brand: KII® SLEEVE

## (undated) DEVICE — INTENDED FOR TISSUE SEPARATION, AND OTHER PROCEDURES THAT REQUIRE A SHARP SURGICAL BLADE TO PUNCTURE OR CUT.: Brand: BARD-PARKER SAFETY BLADES SIZE 10, STERILE